# Patient Record
Sex: MALE | Race: WHITE | NOT HISPANIC OR LATINO
[De-identification: names, ages, dates, MRNs, and addresses within clinical notes are randomized per-mention and may not be internally consistent; named-entity substitution may affect disease eponyms.]

---

## 2017-01-13 ENCOUNTER — APPOINTMENT (OUTPATIENT)
Age: 72
End: 2017-01-13

## 2017-01-13 VITALS
BODY MASS INDEX: 24.92 KG/M2 | WEIGHT: 178 LBS | HEART RATE: 67 BPM | RESPIRATION RATE: 14 BRPM | DIASTOLIC BLOOD PRESSURE: 74 MMHG | HEIGHT: 71 IN | SYSTOLIC BLOOD PRESSURE: 152 MMHG | TEMPERATURE: 97.7 F

## 2017-01-17 LAB
AFP-TM SERPL-MCNC: 2.6 NG/ML
ALBUMIN SERPL ELPH-MCNC: 4 G/DL
ALP BLD-CCNC: 79 U/L
ALT SERPL-CCNC: 11 U/L
ANION GAP SERPL CALC-SCNC: 13 MMOL/L
AST SERPL-CCNC: 22 U/L
BASOPHILS # BLD AUTO: 0.03 K/UL
BASOPHILS NFR BLD AUTO: 0.4 %
BILIRUB SERPL-MCNC: 0.2 MG/DL
BUN SERPL-MCNC: 28 MG/DL
CALCIUM SERPL-MCNC: 9.1 MG/DL
CHLORIDE SERPL-SCNC: 106 MMOL/L
CO2 SERPL-SCNC: 24 MMOL/L
CREAT SERPL-MCNC: 1.57 MG/DL
EOSINOPHIL # BLD AUTO: 0.15 K/UL
EOSINOPHIL NFR BLD AUTO: 1.8 %
HCT VFR BLD CALC: 39.5 %
HCV RNA FLD QL NAA+PROBE: NORMAL
HCV RNA SPEC QL PROBE+SIG AMP: NOT DETECTED
HGB BLD-MCNC: 12.4 G/DL
IMM GRANULOCYTES NFR BLD AUTO: 0.5 %
LYMPHOCYTES # BLD AUTO: 1.94 K/UL
LYMPHOCYTES NFR BLD AUTO: 23.3 %
MAN DIFF?: NORMAL
MCHC RBC-ENTMCNC: 28.1 PG
MCHC RBC-ENTMCNC: 31.4 GM/DL
MCV RBC AUTO: 89.6 FL
MONOCYTES # BLD AUTO: 0.46 K/UL
MONOCYTES NFR BLD AUTO: 5.5 %
NEUTROPHILS # BLD AUTO: 5.72 K/UL
NEUTROPHILS NFR BLD AUTO: 68.5 %
PLATELET # BLD AUTO: 236 K/UL
POTASSIUM SERPL-SCNC: 5.5 MMOL/L
PROT SERPL-MCNC: 7.4 G/DL
RBC # BLD: 4.41 M/UL
RBC # FLD: 15.3 %
SODIUM SERPL-SCNC: 143 MMOL/L
WBC # FLD AUTO: 8.34 K/UL

## 2017-01-18 ENCOUNTER — EMERGENCY (EMERGENCY)
Facility: HOSPITAL | Age: 72
LOS: 1 days | Discharge: HOME | End: 2017-01-18
Attending: EMERGENCY MEDICINE | Admitting: EMERGENCY MEDICINE
Payer: MEDICARE

## 2017-01-18 VITALS
SYSTOLIC BLOOD PRESSURE: 209 MMHG | TEMPERATURE: 98 F | HEART RATE: 59 BPM | DIASTOLIC BLOOD PRESSURE: 79 MMHG | OXYGEN SATURATION: 100 % | RESPIRATION RATE: 20 BRPM

## 2017-01-18 PROCEDURE — 93010 ELECTROCARDIOGRAM REPORT: CPT

## 2017-01-18 PROCEDURE — 99284 EMERGENCY DEPT VISIT MOD MDM: CPT | Mod: 25

## 2017-01-18 RX ORDER — AMLODIPINE BESYLATE 2.5 MG/1
5 TABLET ORAL ONCE
Qty: 0 | Refills: 0 | Status: DISCONTINUED | OUTPATIENT
Start: 2017-01-18 | End: 2017-01-22

## 2017-01-18 NOTE — ED ADULT NURSE NOTE - OBJECTIVE STATEMENT
Octavia RN: Pt received to room20 A&Ox3 c/o high BP. Pt states his BP was high on Friday at his PCP appointment- was told to monitor it over the next few days. Pt went to [facility] tonight to take his BP and went to urgent care right after who told him to come to the ED to be evaluated. Pt takes 20mg Lisinopril PO daily- states compliance with his medications daily and took an extra dose today around 1:30pm per his PCP. Denies chest pain, dizziness, lightheadedness, changes in vision, SOB, n/v or increased stress. Pt appears comfortably in stretcher, respirations even and unlabored, nad noted at this time. PMH HTN, BLE stents 2/2 DVT X2. Sinus bradycardia on tele monitor, labs sent and medicated per MD orders. Report given to LIZZIE Bartlett in area.

## 2017-01-18 NOTE — ED ADULT NURSE NOTE - CAS ELECT INFOMATION PROVIDED
Break coverage RN- pt refused labs/meds, will follow up w. PCP, discharged by MD Angel./DC instructions

## 2017-01-18 NOTE — ED ADULT TRIAGE NOTE - CHIEF COMPLAINT QUOTE
blood pressure was 190/110  at home. as per pt his Bp was high all day, told his PMD and was told to take an extra lisinopril 20 mg which he took but the BP is still high. denies any headache and dizziness or CP or SOB or nausea or any other complaints.

## 2017-01-19 VITALS
HEART RATE: 52 BPM | SYSTOLIC BLOOD PRESSURE: 213 MMHG | DIASTOLIC BLOOD PRESSURE: 62 MMHG | RESPIRATION RATE: 18 BRPM | OXYGEN SATURATION: 100 %

## 2017-01-19 NOTE — ED PROVIDER NOTE - CARE PLAN
Principal Discharge DX:	HTN (hypertension)  Instructions for follow-up, activity and diet:	- Follow up with primary doctor tomorrow please.  - Considered starting a calcium channel blocker but refrained because of bilateral lower extremity edema.   - Return to the ED for new or worsening symptoms.

## 2017-01-19 NOTE — ED PROVIDER NOTE - OBJECTIVE STATEMENT
71M h/o hepC and HTN on [id number] p/w hypertension x 4-5 days. Was hypertensive at PMD's office 5 days ago, checked his BP the next day still high (unknown how high), went to [facility] today and was high, [place] [facility] [place]. Took second dose of lisinopril as per his PMD. Denies CP, SOB, vision change, HA, weakness, numbness, tingling.

## 2017-01-19 NOTE — ED PROVIDER NOTE - PLAN OF CARE
- Follow up with primary doctor tomorrow please.  - Considered starting a calcium channel blocker but refrained because of bilateral lower extremity edema.   - Return to the ED for new or worsening symptoms.

## 2017-01-19 NOTE — ED PROVIDER NOTE - MEDICAL DECISION MAKING DETAILS
asymptomatic hypertension, pt has f/u with doctor tomorrow. After shared decision making and risk vs benefits discussion pt would like to be discharged with outpt f/u

## 2017-01-19 NOTE — ED PROVIDER NOTE - ATTENDING CONTRIBUTION TO CARE
Nielsen: 71 yom with HTN on Lisinopril 20mg with elevated BP for few days, cheking it frequently now, noted BP 180s and 200s.  Pt has no headache, no numbness, no tingling, no cp, no sob, no abd pain, no back pain, no confusion.  Took extra Lisinopril today and has appt in AM.  On exam, /100, HR 57, 100% RA, PEERL, EOMI, clear lungs, nml cardiac, no abd tn or mass, 2+ pitting edema (pt was unaware and syas they always look with that).  EKG with bradycardia. Has long conversation with pt and family, as he is asymptomatic and has close follow up, will hold changing meds now.  No beta blocker due to low HR, no CCB with already swollen legs.  Suggest diuretic but only after electrolytes checked which pt states he had done and will get results as PMD (Dr. Munira Li) office.  Told to return if any symptoms.

## 2017-04-18 PROBLEM — I10 ESSENTIAL (PRIMARY) HYPERTENSION: Chronic | Status: ACTIVE | Noted: 2017-01-19

## 2017-04-20 ENCOUNTER — APPOINTMENT (OUTPATIENT)
Dept: ULTRASOUND IMAGING | Facility: CLINIC | Age: 72
End: 2017-04-20

## 2017-04-20 ENCOUNTER — OUTPATIENT (OUTPATIENT)
Dept: OUTPATIENT SERVICES | Facility: HOSPITAL | Age: 72
LOS: 1 days | End: 2017-04-20
Payer: MEDICARE

## 2017-04-20 DIAGNOSIS — B18.2 CHRONIC VIRAL HEPATITIS C: ICD-10-CM

## 2017-04-20 PROCEDURE — 76700 US EXAM ABDOM COMPLETE: CPT

## 2017-07-21 ENCOUNTER — APPOINTMENT (OUTPATIENT)
Age: 72
End: 2017-07-21

## 2017-07-21 VITALS
BODY MASS INDEX: 23.8 KG/M2 | RESPIRATION RATE: 15 BRPM | SYSTOLIC BLOOD PRESSURE: 126 MMHG | DIASTOLIC BLOOD PRESSURE: 66 MMHG | TEMPERATURE: 97.7 F | HEART RATE: 67 BPM | HEIGHT: 71 IN | WEIGHT: 170 LBS

## 2017-07-21 LAB
ALBUMIN SERPL ELPH-MCNC: 4 G/DL
ALP BLD-CCNC: 89 U/L
ALT SERPL-CCNC: 9 U/L
ANION GAP SERPL CALC-SCNC: 16 MMOL/L
AST SERPL-CCNC: 19 U/L
BASOPHILS # BLD AUTO: 0.02 K/UL
BASOPHILS # BLD AUTO: 0.03 K/UL
BASOPHILS NFR BLD AUTO: 0.2 %
BASOPHILS NFR BLD AUTO: 0.3 %
BILIRUB SERPL-MCNC: 0.2 MG/DL
BUN SERPL-MCNC: 32 MG/DL
CALCIUM SERPL-MCNC: 9 MG/DL
CHLORIDE SERPL-SCNC: 102 MMOL/L
CO2 SERPL-SCNC: 22 MMOL/L
CREAT SERPL-MCNC: 1.56 MG/DL
EOSINOPHIL # BLD AUTO: 0.15 K/UL
EOSINOPHIL # BLD AUTO: 0.17 K/UL
EOSINOPHIL NFR BLD AUTO: 1.7 %
EOSINOPHIL NFR BLD AUTO: 2 %
HCT VFR BLD CALC: 39.5 %
HCT VFR BLD CALC: 40.1 %
HGB BLD-MCNC: 12.7 G/DL
HGB BLD-MCNC: 12.7 G/DL
IMM GRANULOCYTES NFR BLD AUTO: 0.3 %
IMM GRANULOCYTES NFR BLD AUTO: 0.4 %
INR PPP: 1.02 RATIO
LYMPHOCYTES # BLD AUTO: 1.7 K/UL
LYMPHOCYTES # BLD AUTO: 1.88 K/UL
LYMPHOCYTES NFR BLD AUTO: 20.1 %
LYMPHOCYTES NFR BLD AUTO: 21.1 %
MAN DIFF?: NORMAL
MAN DIFF?: NORMAL
MCHC RBC-ENTMCNC: 28.3 PG
MCHC RBC-ENTMCNC: 28.7 PG
MCHC RBC-ENTMCNC: 31.7 GM/DL
MCHC RBC-ENTMCNC: 32.2 GM/DL
MCV RBC AUTO: 89.2 FL
MCV RBC AUTO: 89.5 FL
MONOCYTES # BLD AUTO: 0.42 K/UL
MONOCYTES # BLD AUTO: 0.5 K/UL
MONOCYTES NFR BLD AUTO: 5 %
MONOCYTES NFR BLD AUTO: 5.6 %
NEUTROPHILS # BLD AUTO: 6.11 K/UL
NEUTROPHILS # BLD AUTO: 6.34 K/UL
NEUTROPHILS NFR BLD AUTO: 71 %
NEUTROPHILS NFR BLD AUTO: 72.3 %
PLATELET # BLD AUTO: 240 K/UL
PLATELET # BLD AUTO: 241 K/UL
POTASSIUM SERPL-SCNC: 4.6 MMOL/L
PROT SERPL-MCNC: 7.6 G/DL
PT BLD: 11.5 SEC
RBC # BLD: 4.43 M/UL
RBC # BLD: 4.48 M/UL
RBC # FLD: 15.1 %
RBC # FLD: 15.1 %
SODIUM SERPL-SCNC: 140 MMOL/L
WBC # FLD AUTO: 8.45 K/UL
WBC # FLD AUTO: 8.93 K/UL

## 2017-07-21 RX ORDER — CEPHALEXIN 500 MG/1
500 CAPSULE ORAL
Qty: 14 | Refills: 0 | Status: DISCONTINUED | COMMUNITY
Start: 2017-05-20

## 2017-07-21 RX ORDER — CEFADROXIL 500 MG/1
500 CAPSULE ORAL
Qty: 20 | Refills: 0 | Status: DISCONTINUED | COMMUNITY
Start: 2017-05-23

## 2017-07-24 LAB — AFP-TM SERPL-MCNC: 2 NG/ML

## 2019-01-22 ENCOUNTER — APPOINTMENT (OUTPATIENT)
Dept: UROLOGY | Facility: CLINIC | Age: 74
End: 2019-01-22
Payer: MEDICARE

## 2019-01-22 VITALS
DIASTOLIC BLOOD PRESSURE: 74 MMHG | SYSTOLIC BLOOD PRESSURE: 174 MMHG | HEIGHT: 71 IN | TEMPERATURE: 97.9 F | HEART RATE: 61 BPM | BODY MASS INDEX: 26.6 KG/M2 | WEIGHT: 190 LBS | RESPIRATION RATE: 17 BRPM

## 2019-01-22 PROCEDURE — 99203 OFFICE O/P NEW LOW 30 MIN: CPT

## 2019-01-22 NOTE — ASSESSMENT
[FreeTextEntry1] : This is a 73 year old male with nocturnal polyuria, possibly secondary to BPH or MELISSA.\par \par - voiding diary to identify behavioral changes needed\par - will determine if sleep study is needed\par - check urine culture\par - follow up in 1 month\par - will check a baseline PSA for right sided glandular asymmetry

## 2019-01-22 NOTE — PHYSICAL EXAM
[General Appearance - Well Developed] : well developed [General Appearance - Well Nourished] : well nourished [Normal Appearance] : normal appearance [Well Groomed] : well groomed [General Appearance - In No Acute Distress] : no acute distress [Edema] : no peripheral edema [Abdomen Soft] : soft [Abdomen Tenderness] : non-tender [] : no hepato-splenomegaly [Normal Station and Gait] : the gait and station were normal for the patient's age [Skin Color & Pigmentation] : normal skin color and pigmentation [No Focal Deficits] : no focal deficits [Oriented To Time, Place, And Person] : oriented to person, place, and time [FreeTextEntry1] : 40 g prostate with right sided asymmetry, PVR 0 cc

## 2019-01-22 NOTE — HISTORY OF PRESENT ILLNESS
[FreeTextEntry1] : This is a 73 year old male and distant history with nocturia q 2 hours and sleeping during the daytime.  He has never had a PSA.  \par \par He reports that his urine volume overnight exceeds daytime.\par \par No hematuria or dysuria or pyuria.  Normal stream, mild hesitancy

## 2019-01-24 LAB — BACTERIA UR CULT: NORMAL

## 2019-01-25 LAB — PSA SERPL-MCNC: 3.05 NG/ML

## 2019-02-19 ENCOUNTER — NON-APPOINTMENT (OUTPATIENT)
Age: 74
End: 2019-02-19

## 2019-02-19 ENCOUNTER — APPOINTMENT (OUTPATIENT)
Dept: CARDIOLOGY | Facility: CLINIC | Age: 74
End: 2019-02-19
Payer: MEDICARE

## 2019-02-19 VITALS
WEIGHT: 190 LBS | BODY MASS INDEX: 26.6 KG/M2 | SYSTOLIC BLOOD PRESSURE: 150 MMHG | HEART RATE: 64 BPM | HEIGHT: 71 IN | OXYGEN SATURATION: 100 % | DIASTOLIC BLOOD PRESSURE: 75 MMHG

## 2019-02-19 PROCEDURE — 93000 ELECTROCARDIOGRAM COMPLETE: CPT

## 2019-02-19 PROCEDURE — 99205 OFFICE O/P NEW HI 60 MIN: CPT

## 2019-02-19 NOTE — HISTORY OF PRESENT ILLNESS
[FreeTextEntry1] : Viet is a 73-year-old gentleman BPH, chronic hepatitis C, hypertension, gout, PVD who presents at the request of his rheumatologist. He had an ECHO and stress with Dr. Bueno. He denies any chest pain, palpitations or shortness of breath. He had stents 5 years ago and recently had new ones. Stopped smoking five months ago.

## 2019-02-19 NOTE — DISCUSSION/SUMMARY
[FreeTextEntry1] : The patient is a 73-year-old gentleman ex-smoker  BPH, hypertension, gout, hyperlipidemia, PVD with persistent leg cramps. \par #1 PVD- stents 5 months ago, on DAPT, not smoking, add Coq10 and Mg\par #2 Htn- on amlodipine and lisinopril/HCTZ, stress test and reevaluate BP\par #3 Lipids- on atorvastatin, obtain labs from Dr. Malik\par #4 Gout- on allourinol\par #5 General- We discussed adherence to a low fat, low cholesterol diet and regular daily exercise.\par

## 2019-02-19 NOTE — REVIEW OF SYSTEMS
[Shortness Of Breath] : shortness of breath [Dyspnea on exertion] : dyspnea during exertion [Negative] : Heme/Lymph [Chest Pain] : no chest pain [Lower Ext Edema] : no extremity edema [Palpitations] : no palpitations

## 2019-02-19 NOTE — PHYSICAL EXAM
[General Appearance - Well Developed] : well developed [Normal Appearance] : normal appearance [Well Groomed] : well groomed [General Appearance - Well Nourished] : well nourished [No Deformities] : no deformities [General Appearance - In No Acute Distress] : no acute distress [Normal Conjunctiva] : the conjunctiva exhibited no abnormalities [Eyelids - No Xanthelasma] : the eyelids demonstrated no xanthelasmas [Normal Oral Mucosa] : normal oral mucosa [No Oral Pallor] : no oral pallor [No Oral Cyanosis] : no oral cyanosis [Normal Jugular Venous A Waves Present] : normal jugular venous A waves present [Normal Jugular Venous V Waves Present] : normal jugular venous V waves present [No Jugular Venous Vail A Waves] : no jugular venous avil A waves [Heart Rate And Rhythm] : heart rate and rhythm were normal [Heart Sounds] : normal S1 and S2 [Murmurs] : no murmurs present [Respiration, Rhythm And Depth] : normal respiratory rhythm and effort [Exaggerated Use Of Accessory Muscles For Inspiration] : no accessory muscle use [Auscultation Breath Sounds / Voice Sounds] : lungs were clear to auscultation bilaterally [Abdomen Soft] : soft [Abdomen Tenderness] : non-tender [Abdomen Mass (___ Cm)] : no abdominal mass palpated [Abnormal Walk] : normal gait [Gait - Sufficient For Exercise Testing] : the gait was sufficient for exercise testing [Nail Clubbing] : no clubbing of the fingernails [Cyanosis, Localized] : no localized cyanosis [Petechial Hemorrhages (___cm)] : no petechial hemorrhages [Skin Color & Pigmentation] : normal skin color and pigmentation [] : no rash [No Venous Stasis] : no venous stasis [Skin Lesions] : no skin lesions [No Skin Ulcers] : no skin ulcer [No Xanthoma] : no  xanthoma was observed [Oriented To Time, Place, And Person] : oriented to person, place, and time [Affect] : the affect was normal [Mood] : the mood was normal [No Anxiety] : not feeling anxious

## 2019-02-26 ENCOUNTER — APPOINTMENT (OUTPATIENT)
Dept: UROLOGY | Facility: CLINIC | Age: 74
End: 2019-02-26
Payer: MEDICARE

## 2019-02-26 DIAGNOSIS — N40.1 BENIGN PROSTATIC HYPERPLASIA WITH LOWER URINARY TRACT SYMPMS: ICD-10-CM

## 2019-02-26 DIAGNOSIS — R35.1 BENIGN PROSTATIC HYPERPLASIA WITH LOWER URINARY TRACT SYMPMS: ICD-10-CM

## 2019-02-26 PROCEDURE — 99213 OFFICE O/P EST LOW 20 MIN: CPT

## 2019-02-26 RX ORDER — TAMSULOSIN HYDROCHLORIDE 0.4 MG/1
0.4 CAPSULE ORAL
Qty: 30 | Refills: 2 | Status: DISCONTINUED | COMMUNITY
Start: 2019-02-26 | End: 2019-02-26

## 2019-02-26 NOTE — ASSESSMENT
[FreeTextEntry1] : This is a 74 y/o M with MELISSA and BPH and nocturia (not polyuria) in the setting of evening caffeine intake.  Given his daytime tiredness as well, I suspect this may be related to his sleep apnea or mental state.\par \par - would recommend a repeat sleep study to reassess symptoms and see if he needs\par - avoid nighttime caffeine intake \par - no need for further PSA examinations\par - will try to flomax to optimize bladder emptying

## 2019-02-26 NOTE — HISTORY OF PRESENT ILLNESS
[FreeTextEntry1] : This is a 73 year old male with BPH and MELISSA with nocturia q 2 hours and sleeping during the daytime. According to his voiding diary, he does not meet criteria for nocturnal polyuria (voids 4oz q 2 hours at night).\par \par He denies problems during the day and the voiding diary corroborates daytime voiding q 4 hours.  He has also been drinking larger volumes of coffee and water at nighttime around 7-8 PM.\par \par PSA 3.05 in the setting of mild right-sided glandular asymmetry.

## 2019-02-26 NOTE — PHYSICAL EXAM
[General Appearance - Well Developed] : well developed [General Appearance - Well Nourished] : well nourished [Normal Appearance] : normal appearance [Well Groomed] : well groomed [General Appearance - In No Acute Distress] : no acute distress [Abdomen Soft] : soft [Edema] : no peripheral edema [] : no respiratory distress [Exaggerated Use Of Accessory Muscles For Inspiration] : no accessory muscle use [Oriented To Time, Place, And Person] : oriented to person, place, and time [Affect] : the affect was normal [Not Anxious] : not anxious [Normal Station and Gait] : the gait and station were normal for the patient's age [No Focal Deficits] : no focal deficits

## 2019-02-27 ENCOUNTER — APPOINTMENT (OUTPATIENT)
Dept: HEPATOLOGY | Facility: CLINIC | Age: 74
End: 2019-02-27
Payer: MEDICARE

## 2019-02-27 VITALS
RESPIRATION RATE: 17 BRPM | HEART RATE: 78 BPM | WEIGHT: 189 LBS | BODY MASS INDEX: 26.46 KG/M2 | TEMPERATURE: 98.1 F | HEIGHT: 71 IN | DIASTOLIC BLOOD PRESSURE: 70 MMHG | SYSTOLIC BLOOD PRESSURE: 155 MMHG

## 2019-02-27 PROCEDURE — 99213 OFFICE O/P EST LOW 20 MIN: CPT

## 2019-02-27 NOTE — CONSULT LETTER
[Dear  ___] : Dear  [unfilled], [Courtesy Letter:] : I had the pleasure of seeing your patient, [unfilled], in my office today. [Please see my note below.] : Please see my note below. [Consult Closing:] : Thank you very much for allowing me to participate in the care of this patient.  If you have any questions, please do not hesitate to contact me. [Sincerely,] : Sincerely, [Naomi Torres, N.P] : Naomi Torres, N.P

## 2019-02-27 NOTE — HISTORY OF PRESENT ILLNESS
[de-identified] : Mr. Worthy comes in today for followup visit. He is accompanied by his wife.   He last came in for office visit in 2017. He has a history of hepatitis C, genotype 1a with grade 3 stage III disease on liver biopsy in 2009. He completed hepatitis C treatment  with Sovaldi and Olysio on 8/19/14 and is a sustained virologic responder.  He reports doing well without any complaints. He has no abdominal pain, nausea, vomiting, diarrhea or constipation. \par \par  In the past, he was treated in the past with pegylated interferon and ribavirin and was a relapser. On therapy he developed significant depression and  irritability.  \par \par Fibroscan test from 7/21/17 showed F1, steatosis S1\par \par Patient reported having colonoscopy in 2018 without polyps.\par \par Blood test from 2/7/19 ALT 10, AST 14, Tbil 0.2, ALP 94, ,000\par Abdominal sonogram from April 20, 2017 shows no hepatic lesions.\par Blood tests from March 29, 2016 HCV RNA not detected.\par Blood tests from October 30, 2014 platelets 227,000, HCVRNA not detected, ALT 13, AST 16. Abdominal sonogram from October 30, 2014 gallstone 1.7 cm unchanged. \par \par blood tests from April 1, 2015 hemoglobin 12.0, ALT 10, AST 18, HCV RNA not detected, creatinine 1.51.

## 2019-02-27 NOTE — PHYSICAL EXAM
[Abdominal  Ascites] : no ascites [Asterixis] : no asterixis observed [Jaundice] : No jaundice [General Appearance - Alert] : alert [General Appearance - In No Acute Distress] : in no acute distress [Sclera] : the sclera and conjunctiva were normal [Neck Appearance] : the appearance of the neck was normal [Neck Cervical Mass (___cm)] : no neck mass was observed [Jugular Venous Distention Increased] : there was no jugular-venous distention [Auscultation Breath Sounds / Voice Sounds] : lungs were clear to auscultation bilaterally [Heart Rate And Rhythm] : heart rate was normal and rhythm regular [Heart Sounds] : normal S1 and S2 [Full Pulse] : the pedal pulses are present [Edema] : there was no peripheral edema [Bowel Sounds] : normal bowel sounds [Abdomen Soft] : soft [Abdomen Tenderness] : non-tender [] : no hepato-splenomegaly [Abdomen Mass (___ Cm)] : no abdominal mass palpated [Oriented To Time, Place, And Person] : oriented to person, place, and time [Affect] : the affect was normal

## 2019-02-27 NOTE — ASSESSMENT
[FreeTextEntry1] : 73 year old male with history of hepatitis C S/P treatment  with Sovaldi and Olysio on 8/19/14. Patient is a sustained virological responder.  explained the meaning of sustained virological response. I explained that hepatitis C antibody will always present for life. I explained that the patient will not be able to donate blood because of the positive antibody. I have explained that the antibody is not protective and that hepatitis C infection can be acquired again if exposed. I reviewed the risk factors for acquiring hepatitis C. \par \par His liver biopsy on April 14, 2009 revealed grade 3 stage III disease. His fibroscan test performed today showed F1 fibrosis. I explained that its appeared he has improvement in his liver disease. Since he had bridging fibrosis on liver biopsy, I would like to continue to follow him every 6 months with laboratory studies and abdominal imaging to screen for HCC.\par \par  I recommended repeating AFP level and ultrasound to evaluate for liver cancer. He will call me to discuss the results. I recommended that he continue to abstain from alcohol use. I will see him back in 6 months. \par \par I have discussed with patient the finding of steatosis on Fibroscan. His last liver enzymes were normal. \par I spoke with the patient at length regarding fatty liver disease. I have reviewed the spectrum of disease as well as the risk of disease progression. I have explained that patient with fatty liver disease may progress to the development of cirrhosis and its complications. I have also explained the patient with fatty liver disease may develop liver cancer without cirrhosis and therefore should be screen yearly with an abdominal ultrasound. I have explained that fatty liver disease is commonly seen in patients with diabetes or those who are overweight. I have explained that fatty liver disease may also be a precursor to the development of diabetes as it is part of the metabolic syndrome. I have reviewed the treatment of fatty liver disease with the patient. I have explained that the best therapy is diet and exercise. I have reviewed and appropriate diet with the patient. I have recommended the avoidance of fatty foods and to follow a good healthy heart diet. I have explained that weight loss may lead to an improvement in the underlying liver disease state. \par \par

## 2019-02-28 LAB — AFP-TM SERPL-MCNC: 2.4 NG/ML

## 2019-04-02 ENCOUNTER — APPOINTMENT (OUTPATIENT)
Dept: OTOLARYNGOLOGY | Facility: CLINIC | Age: 74
End: 2019-04-02
Payer: MEDICARE

## 2019-04-02 VITALS
SYSTOLIC BLOOD PRESSURE: 158 MMHG | BODY MASS INDEX: 25.9 KG/M2 | HEIGHT: 71 IN | WEIGHT: 185 LBS | DIASTOLIC BLOOD PRESSURE: 68 MMHG | HEART RATE: 67 BPM

## 2019-04-02 DIAGNOSIS — J34.3 HYPERTROPHY OF NASAL TURBINATES: ICD-10-CM

## 2019-04-02 DIAGNOSIS — H60.91 UNSPECIFIED OTITIS EXTERNA, RIGHT EAR: ICD-10-CM

## 2019-04-02 DIAGNOSIS — H92.01 OTALGIA, RIGHT EAR: ICD-10-CM

## 2019-04-02 PROCEDURE — 92557 COMPREHENSIVE HEARING TEST: CPT

## 2019-04-02 PROCEDURE — 31231 NASAL ENDOSCOPY DX: CPT

## 2019-04-02 PROCEDURE — 99204 OFFICE O/P NEW MOD 45 MIN: CPT | Mod: 25

## 2019-04-02 PROCEDURE — 92567 TYMPANOMETRY: CPT

## 2019-04-02 PROCEDURE — G0268 REMOVAL OF IMPACTED WAX MD: CPT

## 2019-04-02 NOTE — HISTORY OF PRESENT ILLNESS
[de-identified] : 74 y/o M with hx of SNHL, uses HA.  Notes one week ago began having a humming nose in right ear.  No change in hearing, no d/c, no vertigo.  No pain at the time, however 3 days ago began having some mild pain postauricular area.  No pain in the ear.  \par No nasal congestion or sinus pain or pressure.

## 2019-04-02 NOTE — CONSULT LETTER
[FreeTextEntry1] : Dear Dr. HONORIO TIERNEY \par I had the pleasure of evaluating your patient WILMER MORALES, thank you for allowing us to participate in their care. please see full note detailing our visit below.\par If you have any questions, please do not hesitate to call me and I would be happy to discuss further. \par \par Joe Pierson M.D.\par Attending Physician,  \par Department of Otolaryngology - Head and Neck Surgery\[facility] \par Office: [phone]\par Fax: [phone]\par \par

## 2019-04-02 NOTE — PHYSICAL EXAM
[Midline] : trachea located in midline position [Normal] : no rashes [de-identified] : b/l exostosis severe exostosis limiting view of the TM, Right with mild erythema,

## 2019-04-02 NOTE — PROCEDURE
[FreeTextEntry3] : Procedure- removal of cerumen right \par Diagnosis - right cerumen impaction\par Right ear found to have impacted cerumen - it was cleared with suction and curette, canal with massive exostosis \par  [FreeTextEntry6] : Procedure performed: Nasal Endoscopy- Diagnostic\par Pre-op indication(s): nasal congestion\par Post-op indication(s): nasal congestion \par Verbal and/or written consent obtained from patient\par Anterior rhinoscopy insufficient to account for symptoms\par Scope #: 3,  flexible fiber optic telescope \par The scope was introduced in the nasal passage between the middle and inferior turbinates to exam the inferior portion of the middle meatus and the fontanelle, as well as the maxillary ostia.  Next, the scope was passed medically and posteriorly to the middle turbinates to examine the sphenoethmoid recess and the superior turbinate region.\par Upon visualization the finders are as follows:\par Nasal Septum: left septal deviation\par Bilateral - Mucosa: boggy turbinates, Mucous: scant, Polyp: not seen, Inferior Turbinate: boggy, Middle Turbinate: normal, Superior Turbinate: normal, Inferior Meatus: narrow, Middle Meatus: narrow, Super Meatus:normal, Sphenoethmoidal Recess: clear\par

## 2019-04-02 NOTE — ASSESSMENT
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 severe exostosis limiting middle ear evaluation, cerumen cleared right, ear is irritated. new tinnitus. based on audio suggests pt has fluid that may be causing at least part of hearing loss. NP clear, does have some BITh and NSD.  will assess ossicles and middle ear with CT scan. \par right ear pain likely TMJ related as increased with eating\par Patient likely has inflammation of the temporomandibular joint as a cause of their discomfit. I discussed with them the pathophysiology of TMJ disorders and we reviewed treatment options. At this point we will begin with a regiment to decrease inflammation, local muscle spams and demands on the joint to allow for recovery. We also discussed exercises and stretches to preform to decrease pain and relapse. Reviewed possible further interventions including muscle relaxants and injections. They will let me know if it does not completely resolve.\par drops right ear\par \par \par I personally saw and examined WILMER MORALES in detail. I spoke to AIMEE Thapa regarding the assessment and plan of care.  I preformed the procedures and I reviewed the above assessment and plan of care, and agree. I have made changes in changes in the body of the note where appropriate.\par \par

## 2019-04-04 ENCOUNTER — APPOINTMENT (OUTPATIENT)
Dept: ULTRASOUND IMAGING | Facility: CLINIC | Age: 74
End: 2019-04-04

## 2019-04-08 ENCOUNTER — APPOINTMENT (OUTPATIENT)
Dept: CV DIAGNOSTICS | Facility: HOSPITAL | Age: 74
End: 2019-04-08

## 2019-04-08 ENCOUNTER — FORM ENCOUNTER (OUTPATIENT)
Age: 74
End: 2019-04-08

## 2019-04-09 ENCOUNTER — OUTPATIENT (OUTPATIENT)
Dept: OUTPATIENT SERVICES | Facility: HOSPITAL | Age: 74
LOS: 1 days | End: 2019-04-09
Payer: MEDICARE

## 2019-04-09 ENCOUNTER — APPOINTMENT (OUTPATIENT)
Dept: CT IMAGING | Facility: CLINIC | Age: 74
End: 2019-04-09
Payer: MEDICARE

## 2019-04-09 DIAGNOSIS — Z00.8 ENCOUNTER FOR OTHER GENERAL EXAMINATION: ICD-10-CM

## 2019-04-09 PROCEDURE — 70480 CT ORBIT/EAR/FOSSA W/O DYE: CPT | Mod: 26

## 2019-04-09 PROCEDURE — 70480 CT ORBIT/EAR/FOSSA W/O DYE: CPT

## 2019-04-21 ENCOUNTER — FORM ENCOUNTER (OUTPATIENT)
Age: 74
End: 2019-04-21

## 2019-04-22 ENCOUNTER — APPOINTMENT (OUTPATIENT)
Dept: ULTRASOUND IMAGING | Facility: CLINIC | Age: 74
End: 2019-04-22
Payer: MEDICARE

## 2019-04-22 ENCOUNTER — OUTPATIENT (OUTPATIENT)
Dept: OUTPATIENT SERVICES | Facility: HOSPITAL | Age: 74
LOS: 1 days | End: 2019-04-22
Payer: MEDICARE

## 2019-04-22 DIAGNOSIS — Z00.8 ENCOUNTER FOR OTHER GENERAL EXAMINATION: ICD-10-CM

## 2019-04-22 PROCEDURE — 76700 US EXAM ABDOM COMPLETE: CPT | Mod: 26

## 2019-04-22 PROCEDURE — 76700 US EXAM ABDOM COMPLETE: CPT

## 2019-05-16 ENCOUNTER — APPOINTMENT (OUTPATIENT)
Dept: OTOLARYNGOLOGY | Facility: CLINIC | Age: 74
End: 2019-05-16
Payer: MEDICARE

## 2019-05-16 VITALS
SYSTOLIC BLOOD PRESSURE: 153 MMHG | WEIGHT: 185 LBS | HEIGHT: 71 IN | BODY MASS INDEX: 25.9 KG/M2 | DIASTOLIC BLOOD PRESSURE: 62 MMHG

## 2019-05-16 DIAGNOSIS — H61.813 EXOSTOSIS OF EXTERNAL CANAL, BILATERAL: ICD-10-CM

## 2019-05-16 DIAGNOSIS — J34.2 DEVIATED NASAL SEPTUM: ICD-10-CM

## 2019-05-16 DIAGNOSIS — H70.13 CHRONIC MASTOIDITIS, BILATERAL: ICD-10-CM

## 2019-05-16 DIAGNOSIS — M26.609 UNSPECIFIED TEMPOROMANDIBULAR JOINT DISORDER: ICD-10-CM

## 2019-05-16 DIAGNOSIS — H90.6 MIXED CONDUCTIVE AND SENSORINEURAL HEARING LOSS, BILATERAL: ICD-10-CM

## 2019-05-16 DIAGNOSIS — H93.11 TINNITUS, RIGHT EAR: ICD-10-CM

## 2019-05-16 DIAGNOSIS — H61.23 IMPACTED CERUMEN, BILATERAL: ICD-10-CM

## 2019-05-16 PROCEDURE — 92504 EAR MICROSCOPY EXAMINATION: CPT

## 2019-05-16 PROCEDURE — 99213 OFFICE O/P EST LOW 20 MIN: CPT | Mod: 25

## 2019-05-16 NOTE — PROCEDURE
[Risk and Benefits Discussed] : The purpose, risks, discomforts, benefits and alternatives of the procedure have been explained to the patient including no treatment. [Cerumen Impaction] : Cerumen Impaction [] : Removal of Cerumen [FreeTextEntry1] : poss epitympanic cholesteatoma [FreeTextEntry2] : epitympanic cholesteatoma-bilat [FreeTextEntry3] : exostoses,no cholesteatoma [FreeTextEntry6] : the microscope was necessary for illumination and magnification\par wax removed bilat\par exostoses w/o cholesteatoma noted

## 2019-05-16 NOTE — HISTORY OF PRESENT ILLNESS
[de-identified] : 74 y/o M with intermittent tinnitus in Right ear, with mild decrease in hearing.  No pain or d/c, no Vertigo. \par Was seen by Dr. Pierson on 4/2/19, Noted to have Mixed HL with significant conductive component b/l.  \par Had CT scan 4/9/19 - Rt. with small amount of soft tissue in the epitympanum.  TM thickened with possible perforation.  Left - Underdevelopment of mastoid air cells with near complete opacification of mastoid antrum.  Small amt of tissue in epitympanum.  Thickened TM.  [Tinnitus] : tinnitus [Vertigo] : no vertigo [Anxiety] : no anxiety [Headache] : no headache [Dizziness] : no dizziness [Hearing Loss] : hearing loss [Meniere Disease] : no Meniere disease [Presbycusis] : presbycusis [Eustachian Tube Dysfunction] : no eustachian tube dysfunction [Otosclerosis] : no otosclerosis [Cholesteatoma] : no cholesteatoma [Perilymphatic Fistula] : no perilymphatic fistula [Autoimmune Diseases] : no autoimmune diseases [Hypertension] : no hypertension [Hypotension] : no hypotension [Loud Noise Exposure] : no history of loud noise exposure [Smoking] : no smoking [Early Onset Hearing Loss] : no early onset hearing loss [Stroke] : no stroke [Facial Pain] : no facial pain [Facial Pressure] : no facial pressure [Nasal Congestion] : no nasal congestion [Ear Fullness] : no ear fullness [Environmental Allergies] : no environmental allergies [Seasonal Allergies] : no seasonal allergies [Allergies] : no allergies [Adenoidectomy] : no adenoidectomy [Asthma] : no asthma [Neck Mass] : no neck mass [Chills] : no chills [Neck Pain] : no neck pain [Cold Intolerance] : no cold intolerance [Cough] : no cough [Fatigue] : no fatigue [Heat Intolerance] : no heat intolerance [Hyperthyroidism] : no hyperthyroidism [Sialadenitis] : no sialadenitis [Hodgkin Disease] : no hodgkin disease [Tobacco Use] : no tobacco use [Non-Hodgkin Lymphoma] : no non-hodgkin lymphoma [Alcohol Use] : no alcohol use [Graves Disease] : no graves disease

## 2019-05-16 NOTE — PHYSICAL EXAM
[Midline] : trachea located in midline position [de-identified] : b/l exostosis.  Left with Cerumen  [Normal] : salivary glands are normal

## 2019-05-16 NOTE — ASSESSMENT
[FreeTextEntry1] : Bilat exostoses w/ wax\par no cgolesteatoma\par rx-rubbing alcohol to both ears several times a week\par bilateral sensorineural hearing loss-cleared for hearing aids\par f/u 4-6 months and prn\par pt reassured

## 2019-05-16 NOTE — DATA REVIEWED
[de-identified] : bilat hearing loss [de-identified] : Bilaty epitympanic fullness\par exostoses\par no yesenia erosion

## 2019-05-31 ENCOUNTER — APPOINTMENT (OUTPATIENT)
Dept: UROLOGY | Facility: CLINIC | Age: 74
End: 2019-05-31

## 2019-06-07 ENCOUNTER — OUTPATIENT (OUTPATIENT)
Dept: OUTPATIENT SERVICES | Facility: HOSPITAL | Age: 74
LOS: 1 days | End: 2019-06-07
Payer: MEDICARE

## 2019-06-07 ENCOUNTER — APPOINTMENT (OUTPATIENT)
Dept: ULTRASOUND IMAGING | Facility: HOSPITAL | Age: 74
End: 2019-06-07
Payer: MEDICARE

## 2019-06-07 DIAGNOSIS — Z00.00 ENCOUNTER FOR GENERAL ADULT MEDICAL EXAMINATION WITHOUT ABNORMAL FINDINGS: ICD-10-CM

## 2019-06-07 DIAGNOSIS — I74.9 EMBOLISM AND THROMBOSIS OF UNSPECIFIED ARTERY: ICD-10-CM

## 2019-06-07 PROCEDURE — 93880 EXTRACRANIAL BILAT STUDY: CPT | Mod: 26

## 2019-06-07 PROCEDURE — 93880 EXTRACRANIAL BILAT STUDY: CPT

## 2019-08-19 LAB
ALBUMIN SERPL ELPH-MCNC: 4.2 G/DL
ALP BLD-CCNC: 81 U/L
ALT SERPL-CCNC: 15 U/L
ANION GAP SERPL CALC-SCNC: 10 MMOL/L
AST SERPL-CCNC: 14 U/L
BASOPHILS # BLD AUTO: 0.04 K/UL
BASOPHILS NFR BLD AUTO: 0.5 %
BILIRUB SERPL-MCNC: 0.3 MG/DL
BUN SERPL-MCNC: 28 MG/DL
CALCIUM SERPL-MCNC: 8.9 MG/DL
CHLORIDE SERPL-SCNC: 106 MMOL/L
CO2 SERPL-SCNC: 25 MMOL/L
CREAT SERPL-MCNC: 1.44 MG/DL
EOSINOPHIL # BLD AUTO: 0.23 K/UL
EOSINOPHIL NFR BLD AUTO: 3 %
HCT VFR BLD CALC: 33.8 %
HGB BLD-MCNC: 10.5 G/DL
IMM GRANULOCYTES NFR BLD AUTO: 0.7 %
LYMPHOCYTES # BLD AUTO: 1.66 K/UL
LYMPHOCYTES NFR BLD AUTO: 21.7 %
MAN DIFF?: NORMAL
MCHC RBC-ENTMCNC: 28.9 PG
MCHC RBC-ENTMCNC: 31.1 GM/DL
MCV RBC AUTO: 93.1 FL
MONOCYTES # BLD AUTO: 0.54 K/UL
MONOCYTES NFR BLD AUTO: 7.1 %
NEUTROPHILS # BLD AUTO: 5.12 K/UL
NEUTROPHILS NFR BLD AUTO: 67 %
PLATELET # BLD AUTO: 214 K/UL
POTASSIUM SERPL-SCNC: 4.7 MMOL/L
PROT SERPL-MCNC: 6.7 G/DL
RBC # BLD: 3.63 M/UL
RBC # FLD: 16.3 %
SODIUM SERPL-SCNC: 141 MMOL/L
WBC # FLD AUTO: 7.64 K/UL

## 2019-08-21 ENCOUNTER — APPOINTMENT (OUTPATIENT)
Dept: HEPATOLOGY | Facility: CLINIC | Age: 74
End: 2019-08-21
Payer: MEDICARE

## 2019-08-21 VITALS
BODY MASS INDEX: 26.6 KG/M2 | TEMPERATURE: 97.6 F | WEIGHT: 190 LBS | SYSTOLIC BLOOD PRESSURE: 168 MMHG | RESPIRATION RATE: 16 BRPM | HEART RATE: 75 BPM | DIASTOLIC BLOOD PRESSURE: 74 MMHG | HEIGHT: 71 IN

## 2019-08-21 PROCEDURE — 91200 LIVER ELASTOGRAPHY: CPT

## 2019-08-21 PROCEDURE — ZZZZZ: CPT

## 2019-08-21 PROCEDURE — 99214 OFFICE O/P EST MOD 30 MIN: CPT | Mod: 25

## 2019-08-21 RX ORDER — CIPROFLOXACIN AND DEXAMETHASONE 3; 1 MG/ML; MG/ML
0.3-0.1 SUSPENSION/ DROPS AURICULAR (OTIC)
Qty: 1 | Refills: 0 | Status: DISCONTINUED | COMMUNITY
Start: 2019-04-02 | End: 2019-08-21

## 2019-08-21 RX ORDER — TAMSULOSIN HYDROCHLORIDE 0.4 MG/1
0.4 CAPSULE ORAL
Qty: 30 | Refills: 2 | Status: DISCONTINUED | COMMUNITY
Start: 2019-02-26 | End: 2019-08-21

## 2019-08-21 NOTE — ASSESSMENT
[FreeTextEntry1] : 74 year old male with history of hepatitis C S/P treatment  with Sovaldi and Olysio on 8/19/14. Patient is a sustained virological responder.  explained the meaning of sustained virological response. I explained that hepatitis C antibody will always present for life. I explained that the patient will not be able to donate blood because of the positive antibody. I have explained that the antibody is not protective and that hepatitis C infection can be acquired again if exposed. I reviewed the risk factors for acquiring hepatitis C. \par \par His liver biopsy on April 14, 2009 revealed grade 3 stage III disease. His fibroscan test performed today showed F2 fibrosis. I explained that its appeared he has improvement in his liver disease. Since he had bridging fibrosis on liver biopsy, I would like to continue to follow him every 6 months with laboratory studies and abdominal imaging to screen for HCC.\par \par  I recommended repeating ultrasound in Oct  to evaluate for liver cancer. He will call me to discuss the results. I recommended that he continue to abstain from alcohol use. I will see him back in 6 months. \par \par I have discussed with patient the finding of steatosis on Fibroscan. His last liver enzymes were normal. \par I spoke with the patient at length regarding fatty liver disease. I have reviewed the spectrum of disease as well as the risk of disease progression. I have explained that patient with fatty liver disease may progress to the development of cirrhosis and its complications. I have also explained the patient with fatty liver disease may develop liver cancer without cirrhosis and therefore should be screen yearly with an abdominal ultrasound. I have explained that fatty liver disease is commonly seen in patients with diabetes or those who are overweight. I have explained that fatty liver disease may also be a precursor to the development of diabetes as it is part of the metabolic syndrome. I have reviewed the treatment of fatty liver disease with the patient. I have explained that the best therapy is diet and exercise. I have reviewed and appropriate diet with the patient. I have recommended the avoidance of fatty foods and to follow a good healthy heart diet. I have explained that weight loss may lead to an improvement in the underlying liver disease state. \par \par

## 2019-08-21 NOTE — CONSULT LETTER
[Dear  ___] : Dear  [unfilled], [Please see my note below.] : Please see my note below. [Courtesy Letter:] : I had the pleasure of seeing your patient, [unfilled], in my office today. [Sincerely,] : Sincerely, [Consult Closing:] : Thank you very much for allowing me to participate in the care of this patient.  If you have any questions, please do not hesitate to contact me. [Naomi Torres, N.P] : Naomi Torres, N.P

## 2019-08-21 NOTE — PHYSICAL EXAM
[General Appearance - Alert] : alert [General Appearance - In No Acute Distress] : in no acute distress [Sclera] : the sclera and conjunctiva were normal [Neck Appearance] : the appearance of the neck was normal [Neck Cervical Mass (___cm)] : no neck mass was observed [Jugular Venous Distention Increased] : there was no jugular-venous distention [Auscultation Breath Sounds / Voice Sounds] : lungs were clear to auscultation bilaterally [Heart Rate And Rhythm] : heart rate was normal and rhythm regular [Heart Sounds] : normal S1 and S2 [Edema] : there was no peripheral edema [Full Pulse] : the pedal pulses are present [Abdomen Soft] : soft [Bowel Sounds] : normal bowel sounds [Abdomen Tenderness] : non-tender [] : no hepato-splenomegaly [Abdomen Mass (___ Cm)] : no abdominal mass palpated [Oriented To Time, Place, And Person] : oriented to person, place, and time [Affect] : the affect was normal [Abdominal  Ascites] : no ascites [Asterixis] : no asterixis observed [Jaundice] : No jaundice

## 2019-08-21 NOTE — HISTORY OF PRESENT ILLNESS
[de-identified] : Mr. Worthy comes in today for followup visit. He is accompanied by his wife. He has a history of hepatitis C, genotype 1a with grade 3 stage III disease on liver biopsy in 2009. He was treated for hepatitis C with Sovaldi and Olysio on 8/19/14 and is a sustained virologic responder.  He reports doing well without any complaints. He has no abdominal pain, nausea, vomiting, diarrhea or constipation. \par \par  In the past, he was treated in the past with pegylated interferon and ribavirin and was a relapser. On therapy he developed significant depression and  irritability.  \par \par Fibroscan test from 7/21/17 showed F1, steatosis S1\par Fibroscan test from 8/21/19 showed F2, S2\par \par Patient reported having colonoscopy in 2018 without polyps.\par \par Blood test from 2/7/19 ALT 10, AST 14, Tbil 0.2, ALP 94, ,000\par Abdominal sonogram from April 20, 2017 shows no hepatic lesions.\par Blood tests from March 29, 2016 HCV RNA not detected.\par Blood tests from October 30, 2014 platelets 227,000, HCVRNA not detected, ALT 13, AST 16. Abdominal sonogram from October 30, 2014 gallstone 1.7 cm unchanged. \par \par blood tests from April 1, 2015 hemoglobin 12.0, ALT 10, AST 18, HCV RNA not detected, creatinine 1.51.

## 2020-02-21 ENCOUNTER — APPOINTMENT (OUTPATIENT)
Dept: HEPATOLOGY | Facility: CLINIC | Age: 75
End: 2020-02-21
Payer: MEDICARE

## 2020-02-21 VITALS
SYSTOLIC BLOOD PRESSURE: 167 MMHG | HEIGHT: 71 IN | TEMPERATURE: 98 F | DIASTOLIC BLOOD PRESSURE: 64 MMHG | HEART RATE: 73 BPM | RESPIRATION RATE: 16 BRPM | BODY MASS INDEX: 27.86 KG/M2 | WEIGHT: 199 LBS

## 2020-02-21 PROCEDURE — 99214 OFFICE O/P EST MOD 30 MIN: CPT

## 2020-02-22 LAB
ALBUMIN SERPL ELPH-MCNC: 4.4 G/DL
ALP BLD-CCNC: 95 U/L
ALT SERPL-CCNC: 18 U/L
ANION GAP SERPL CALC-SCNC: 11 MMOL/L
AST SERPL-CCNC: 18 U/L
BASOPHILS # BLD AUTO: 0.03 K/UL
BASOPHILS NFR BLD AUTO: 0.4 %
BILIRUB SERPL-MCNC: 0.2 MG/DL
BUN SERPL-MCNC: 30 MG/DL
CALCIUM SERPL-MCNC: 9.1 MG/DL
CHLORIDE SERPL-SCNC: 109 MMOL/L
CO2 SERPL-SCNC: 23 MMOL/L
CREAT SERPL-MCNC: 1.54 MG/DL
EOSINOPHIL # BLD AUTO: 0.15 K/UL
EOSINOPHIL NFR BLD AUTO: 1.9 %
HCT VFR BLD CALC: 37 %
HGB BLD-MCNC: 11.2 G/DL
IMM GRANULOCYTES NFR BLD AUTO: 0.8 %
LYMPHOCYTES # BLD AUTO: 1.55 K/UL
LYMPHOCYTES NFR BLD AUTO: 20.1 %
MAN DIFF?: NORMAL
MCHC RBC-ENTMCNC: 28.4 PG
MCHC RBC-ENTMCNC: 30.3 GM/DL
MCV RBC AUTO: 93.7 FL
MONOCYTES # BLD AUTO: 0.76 K/UL
MONOCYTES NFR BLD AUTO: 9.9 %
NEUTROPHILS # BLD AUTO: 5.16 K/UL
NEUTROPHILS NFR BLD AUTO: 66.9 %
PLATELET # BLD AUTO: 202 K/UL
POTASSIUM SERPL-SCNC: 4.9 MMOL/L
PROT SERPL-MCNC: 6.6 G/DL
RBC # BLD: 3.95 M/UL
RBC # FLD: 15.6 %
SODIUM SERPL-SCNC: 144 MMOL/L
WBC # FLD AUTO: 7.71 K/UL

## 2020-02-24 ENCOUNTER — APPOINTMENT (OUTPATIENT)
Dept: HEPATOLOGY | Facility: CLINIC | Age: 75
End: 2020-02-24
Payer: MEDICARE

## 2020-02-24 LAB
HCV RNA SERPL NAA DL=5-ACNC: NOT DETECTED IU/ML
HCV RNA SERPL NAA+PROBE-LOG IU: NOT DETECTED LOG10IU/ML

## 2020-02-24 PROCEDURE — 91200 LIVER ELASTOGRAPHY: CPT

## 2020-03-02 ENCOUNTER — APPOINTMENT (OUTPATIENT)
Dept: HEPATOLOGY | Facility: CLINIC | Age: 75
End: 2020-03-02

## 2020-11-19 ENCOUNTER — APPOINTMENT (OUTPATIENT)
Dept: HEPATOLOGY | Facility: CLINIC | Age: 75
End: 2020-11-19

## 2021-01-06 ENCOUNTER — APPOINTMENT (OUTPATIENT)
Dept: HEPATOLOGY | Facility: CLINIC | Age: 76
End: 2021-01-06
Payer: MEDICARE

## 2021-01-06 VITALS
RESPIRATION RATE: 16 BRPM | HEART RATE: 65 BPM | BODY MASS INDEX: 27.02 KG/M2 | TEMPERATURE: 98 F | SYSTOLIC BLOOD PRESSURE: 159 MMHG | WEIGHT: 193 LBS | DIASTOLIC BLOOD PRESSURE: 66 MMHG | HEIGHT: 71 IN

## 2021-01-06 PROCEDURE — 99213 OFFICE O/P EST LOW 20 MIN: CPT

## 2021-01-06 PROCEDURE — 99072 ADDL SUPL MATRL&STAF TM PHE: CPT

## 2021-01-06 NOTE — HISTORY OF PRESENT ILLNESS
[FreeTextEntry1] :  is a 75 year old with history of hepatitis C (genotype 1a) status post Sovaldi and Olysio on 8/19/14 with SVR who presents to the hepatology clinic for follow up. He had a liver biopsy in 2009 that notes grade 3 stage III disease. In the past, he was treated in the past with pegylated interferon and ribavirin and was a relapser. On therapy he developed significant depression and irritability. He had a fibroscan 02/24/20 that scored a median liver stiffness of 6.4 kPa which is consistent with F 0-1 disease,  dB/m (S 2). His fibroscan from 08/21/20 scored a median liver stiffness of 7.2 kPa which is consistent with F2 disease,  dB/m (S 2).His fibroscan from 07/21/17 scored a liver stiffness score of 5.0 kPa which is consistent with F0-F1 liver disease and a CAP of 255 dB/m which is consistent with S1 fatty liver. His abdominal US from 04/2019 showed normal liver with no lesions. \par \par Since his last visit, he has been doing well. He lost about 5 lbs in the past year from being more active (his goal is to be ~185 lbs). He denies any recent hospitalization or any new medications. He denies any fever, chills, anorexia, weight change, abdominal pain, jaundice, pruritus or fatigue\par

## 2021-01-06 NOTE — ASSESSMENT
[FreeTextEntry1] : Assessment and Plan:  is a 75 year old with history of hepatitis C (genotype 1a) status post Sovaldi and Olysio on 8/19/14 with SVR who presents to the hepatology clinic for follow up.\par \par 1. Hepatitis C (genotype 1a) status post Sovaldi and Olysio on 8/19/14 with SVR\par Patient doing well at this time. He had a liver biopsy in 2009 that notes grade 3 stage III disease. He had a fibroscan 02/24/20 that scored a median liver stiffness of 6.4 kPa which is consistent with F 0-1 disease,  dB/m (S 2). Discussed he has had improvement with his liver disease overtime. Discussed Hep C re-infection risks and complications of the disease with patient. Will repeat labs now. \par \par HCC Screen: His abdominal US from 04/2019 showed normal liver with no lesions. Will repeat ultrasound now along with AFP with labs. \par \par Patient was advised to abstain from alcohol and all illicit drugs, avoid herbal and dietary supplements, limit use of acetaminophen to <2 grams per day, avoid use of nonsteroidal antiinflammatory drugs (NSAIDs) as these can precipitate renal dysfunction in patients with advanced liver disease, avoid eating any unpasteurized dairy products, and avoid eating raw/steamed oysters or other shellfish to avoid risk of Vibrio infection.\par \par 2. Health Maintenance \par Immunizations: will check HAV/HBV with next of set of labs\par Colonoscopy: 2019, due to repeat soon but patient prefers to wait\par \par Follow Up: 6 months \par \par Gonzalez Walter\par Nurse Practitioner \par Hepatology\par [facility] \par [address]\[place] [address]\par Tel: [phone]

## 2021-01-06 NOTE — PHYSICAL EXAM
[General Appearance - Alert] : alert [Respiration, Rhythm And Depth] : normal respiratory rhythm and effort [Heart Rate And Rhythm] : heart rate was normal and rhythm regular [Edema] : there was no peripheral edema [Bowel Sounds] : normal bowel sounds [Abnormal Walk] : normal gait [Skin Color & Pigmentation] : normal skin color and pigmentation [Oriented To Time, Place, And Person] : oriented to person, place, and time [Scleral Icterus] : No Scleral Icterus [Spider Angioma] : No spider angioma(s) were observed [Abdominal  Ascites] : no ascites [Ascites Fluid Wave] : no ascites fluid wave [Asterixis] : no asterixis observed [Jaundice] : No jaundice [Hallucinations] : ~T no ~M hallucinations [Delusions] : no ~T delusions

## 2021-01-06 NOTE — REVIEW OF SYSTEMS
[As Noted in HPI] : as noted in HPI [Recent Weight Loss (___ Lbs)] : recent [unfilled] ~Ulb weight loss [Negative] : Heme/Lymph [Fever] : no fever [Chills] : no chills [Feeling Poorly] : not feeling poorly [Feeling Tired] : not feeling tired [Recent Weight Gain (___ Lbs)] : no recent weight gain

## 2021-01-07 ENCOUNTER — NON-APPOINTMENT (OUTPATIENT)
Age: 76
End: 2021-01-07

## 2021-01-07 LAB
AFP-TM SERPL-MCNC: 2.2 NG/ML
ALBUMIN SERPL ELPH-MCNC: 4.2 G/DL
ALP BLD-CCNC: 98 U/L
ALT SERPL-CCNC: 13 U/L
ANION GAP SERPL CALC-SCNC: 11 MMOL/L
AST SERPL-CCNC: 17 U/L
BASOPHILS # BLD AUTO: 0.03 K/UL
BASOPHILS NFR BLD AUTO: 0.4 %
BILIRUB SERPL-MCNC: 0.2 MG/DL
BUN SERPL-MCNC: 28 MG/DL
CALCIUM SERPL-MCNC: 8.8 MG/DL
CHLORIDE SERPL-SCNC: 107 MMOL/L
CO2 SERPL-SCNC: 24 MMOL/L
CREAT SERPL-MCNC: 1.66 MG/DL
EOSINOPHIL # BLD AUTO: 0.23 K/UL
EOSINOPHIL NFR BLD AUTO: 3.1 %
GLUCOSE SERPL-MCNC: 96 MG/DL
HBV CORE IGG+IGM SER QL: REACTIVE
HBV SURFACE AB SERPL IA-ACNC: 203.1 MIU/ML
HCT VFR BLD CALC: 38 %
HEPATITIS A IGG ANTIBODY: REACTIVE
HGB BLD-MCNC: 11.2 G/DL
IMM GRANULOCYTES NFR BLD AUTO: 0.4 %
INR PPP: 0.93 RATIO
LYMPHOCYTES # BLD AUTO: 1.73 K/UL
LYMPHOCYTES NFR BLD AUTO: 23.7 %
MAN DIFF?: NORMAL
MCHC RBC-ENTMCNC: 27.2 PG
MCHC RBC-ENTMCNC: 29.5 GM/DL
MCV RBC AUTO: 92.2 FL
MONOCYTES # BLD AUTO: 0.52 K/UL
MONOCYTES NFR BLD AUTO: 7.1 %
NEUTROPHILS # BLD AUTO: 4.77 K/UL
NEUTROPHILS NFR BLD AUTO: 65.3 %
PLATELET # BLD AUTO: 204 K/UL
POTASSIUM SERPL-SCNC: 4.5 MMOL/L
PROT SERPL-MCNC: 6.5 G/DL
PT BLD: 11.1 SEC
RBC # BLD: 4.12 M/UL
RBC # FLD: 15.7 %
SODIUM SERPL-SCNC: 141 MMOL/L
WBC # FLD AUTO: 7.31 K/UL

## 2021-01-12 ENCOUNTER — OUTPATIENT (OUTPATIENT)
Dept: OUTPATIENT SERVICES | Facility: HOSPITAL | Age: 76
LOS: 1 days | End: 2021-01-12
Payer: MEDICARE

## 2021-01-12 ENCOUNTER — NON-APPOINTMENT (OUTPATIENT)
Age: 76
End: 2021-01-12

## 2021-01-12 ENCOUNTER — APPOINTMENT (OUTPATIENT)
Dept: ULTRASOUND IMAGING | Facility: CLINIC | Age: 76
End: 2021-01-12
Payer: MEDICARE

## 2021-01-12 DIAGNOSIS — B18.2 CHRONIC VIRAL HEPATITIS C: ICD-10-CM

## 2021-01-12 DIAGNOSIS — Z00.8 ENCOUNTER FOR OTHER GENERAL EXAMINATION: ICD-10-CM

## 2021-01-12 PROCEDURE — 76700 US EXAM ABDOM COMPLETE: CPT | Mod: 26

## 2021-01-12 PROCEDURE — 76700 US EXAM ABDOM COMPLETE: CPT

## 2021-06-08 ENCOUNTER — APPOINTMENT (OUTPATIENT)
Dept: HEPATOLOGY | Facility: CLINIC | Age: 76
End: 2021-06-08
Payer: MEDICARE

## 2021-06-08 VITALS
WEIGHT: 189 LBS | HEIGHT: 71 IN | HEART RATE: 80 BPM | DIASTOLIC BLOOD PRESSURE: 64 MMHG | BODY MASS INDEX: 26.46 KG/M2 | RESPIRATION RATE: 16 BRPM | TEMPERATURE: 97.9 F | SYSTOLIC BLOOD PRESSURE: 169 MMHG

## 2021-06-08 PROCEDURE — 99215 OFFICE O/P EST HI 40 MIN: CPT

## 2021-06-08 PROCEDURE — 91200 LIVER ELASTOGRAPHY: CPT

## 2021-06-08 NOTE — ASSESSMENT
[FreeTextEntry1] : Mr. WILMER MORALES is 75 year old male with Hepatic steatosis and fibrosis. No recent labs or imaging to review. Advised to call back to discuss the results, if no significant changes seen can F/U as planned.\par \par # Hepatitis C (genotype 1a) status post Sovaldi and Olysio on 8/19/14 with SVR. AFP 2.2. HCV RNA not detected since 02/21/2020. Patient doing well at this time. He had a liver biopsy in 2009 that notes grade 3 stage III disease. He had a fibroscan 02/24/20 that scored a median liver stiffness of 6.4 kPa which is consistent with F 0-1 disease,  dB/m (S 2). Discussed he has had improvement with his liver disease overtime. Discussed Hep C re-infection risks and complications of the disease with patient. Will repeat labs now. \par \par # HCC Screen: US ab on 01/12/2021 shows Normal results with no lesions. Will repeat ultrasound now along with AFP with labs. Fibroscan 02/24/20 shows liver stiffness of 6.4 kPa with F 0-1 disease,  dB/m (S 2). \par Patient was advised to abstain from alcohol and all illicit drugs, avoid herbal and dietary supplements, limit use of acetaminophen to <2 grams per day, avoid use of nonsteroidal anti-inflammatory drugs (NSAIDs) as these can precipitate renal dysfunction in patients with advanced liver disease, avoid eating any unpasteurized dairy products, and avoid eating raw/steamed oysters or other shellfish to avoid risk of Vibrio infection.\par \par # Chronic Renal failure - Labs done on 01/07/2021 WDL- CMP except chronic Bun/Cr 28/1.66 with low eGFR 39. \par # Immunizations: Immune to HBV and HAV (01/06/2021). He completed the 2 dose COVID vaccine with no adverse effects.\par \par # Colonoscopy: 2019, due to repeat soon but patient prefers to wait. Low Hb/Hct 11.2/38 with WDL- .\par \par PLAN to follow up 6 months with labs. \par Encouraged to call back in the interim with any issues or concerns so that we can address and assist as required.

## 2021-06-08 NOTE — HISTORY OF PRESENT ILLNESS
[de-identified] : Mr. WILMER MORALES is 75 year old male who presents for the follow up appointment with Hepatic steatosis and fibrosis He denies any recent hospitalization or any new medications. He denies any fever, chills, anorexia, weight change, abdominal pain, jaundice, pruritus or fatigue.\par \par Persistent H/O H/O hepatitis C (genotype 1a) status post Sovaldi and Olysio on 8/19/14 with SVR. Since 01/06/2021 he lost about 4 lbs (weighs 189 lbs). Was treated in the past with pegylated interferon and ribavirin and was a relapser. On therapy he developed significant depression and irritability. \par \par Liver biopsy in 2009 that notes grade 3 stage III disease. \par Fibroscan 02/24/20 shows liver stiffness of 6.4 kPa with F 0-1 disease,  dB/m (S 2). \par Fibroscan from 08/21/20 scored stiffness of 7.2 kPa with F2 disease,  dB/m (S 2).\par Fibroscan from 07/21/17 scored stiffness score of 5.0 kPa with F0-F1, CAP of 255 dB/m with S1 fatty liver. \par \par US ab on 01/12/2021 shows Normal results. Labs done on 01/07/2021 WDL- CMP except chronic Bun/Cr 28/1.66 with low eGFR 39. Low Hb/Hct 11.2/38 with WDL- , AFP 2.2. HCV RNA not detected since 02/21/2020. \par Immune to HBV and HAV (01/06/2021).\par \par Abdominal US from 04/2019 showed normal liver with no lesions. \par

## 2021-06-09 LAB
ALBUMIN SERPL ELPH-MCNC: 4.6 G/DL
ALP BLD-CCNC: 101 U/L
ALT SERPL-CCNC: 9 U/L
ANION GAP SERPL CALC-SCNC: 13 MMOL/L
AST SERPL-CCNC: 16 U/L
BASOPHILS # BLD AUTO: 0.04 K/UL
BASOPHILS NFR BLD AUTO: 0.5 %
BILIRUB SERPL-MCNC: 0.2 MG/DL
BUN SERPL-MCNC: 39 MG/DL
CALCIUM SERPL-MCNC: 9.3 MG/DL
CHLORIDE SERPL-SCNC: 106 MMOL/L
CO2 SERPL-SCNC: 21 MMOL/L
CREAT SERPL-MCNC: 1.76 MG/DL
EOSINOPHIL # BLD AUTO: 0.14 K/UL
EOSINOPHIL NFR BLD AUTO: 1.6 %
HCT VFR BLD CALC: 37.7 %
HGB BLD-MCNC: 11.9 G/DL
IMM GRANULOCYTES NFR BLD AUTO: 0.8 %
LYMPHOCYTES # BLD AUTO: 1.75 K/UL
LYMPHOCYTES NFR BLD AUTO: 20 %
MAN DIFF?: NORMAL
MCHC RBC-ENTMCNC: 28.9 PG
MCHC RBC-ENTMCNC: 31.6 GM/DL
MCV RBC AUTO: 91.5 FL
MONOCYTES # BLD AUTO: 0.63 K/UL
MONOCYTES NFR BLD AUTO: 7.2 %
NEUTROPHILS # BLD AUTO: 6.11 K/UL
NEUTROPHILS NFR BLD AUTO: 69.9 %
PLATELET # BLD AUTO: 206 K/UL
POTASSIUM SERPL-SCNC: 5.4 MMOL/L
PROT SERPL-MCNC: 7.1 G/DL
RBC # BLD: 4.12 M/UL
RBC # FLD: 15 %
SODIUM SERPL-SCNC: 141 MMOL/L
WBC # FLD AUTO: 8.74 K/UL

## 2021-06-21 ENCOUNTER — NON-APPOINTMENT (OUTPATIENT)
Age: 76
End: 2021-06-21

## 2021-11-15 ENCOUNTER — APPOINTMENT (OUTPATIENT)
Dept: ORTHOPEDIC SURGERY | Facility: CLINIC | Age: 76
End: 2021-11-15

## 2021-11-15 VITALS
DIASTOLIC BLOOD PRESSURE: 65 MMHG | SYSTOLIC BLOOD PRESSURE: 155 MMHG | HEIGHT: 71 IN | HEART RATE: 73 BPM | BODY MASS INDEX: 26.46 KG/M2 | WEIGHT: 189 LBS

## 2021-12-09 ENCOUNTER — RESULT REVIEW (OUTPATIENT)
Age: 76
End: 2021-12-09

## 2021-12-15 ENCOUNTER — RESULT REVIEW (OUTPATIENT)
Age: 76
End: 2021-12-15

## 2021-12-29 ENCOUNTER — APPOINTMENT (OUTPATIENT)
Dept: PULMONOLOGY | Facility: CLINIC | Age: 76
End: 2021-12-29

## 2022-01-03 ENCOUNTER — RESULT REVIEW (OUTPATIENT)
Age: 77
End: 2022-01-03

## 2022-01-10 ENCOUNTER — APPOINTMENT (OUTPATIENT)
Dept: THORACIC SURGERY | Facility: CLINIC | Age: 77
End: 2022-01-10
Payer: MEDICARE

## 2022-01-10 VITALS
BODY MASS INDEX: 26.16 KG/M2 | HEART RATE: 61 BPM | SYSTOLIC BLOOD PRESSURE: 152 MMHG | HEIGHT: 71.5 IN | DIASTOLIC BLOOD PRESSURE: 70 MMHG | RESPIRATION RATE: 16 BRPM | OXYGEN SATURATION: 98 % | WEIGHT: 191 LBS

## 2022-01-10 DIAGNOSIS — Z87.891 PERSONAL HISTORY OF NICOTINE DEPENDENCE: ICD-10-CM

## 2022-01-10 DIAGNOSIS — M10.9 GOUT, UNSPECIFIED: ICD-10-CM

## 2022-01-10 PROCEDURE — 99205 OFFICE O/P NEW HI 60 MIN: CPT

## 2022-01-10 RX ORDER — COLCHICINE 0.6 MG/1
0.6 CAPSULE ORAL
Refills: 0 | Status: DISCONTINUED | COMMUNITY
Start: 2021-06-08 | End: 2022-01-10

## 2022-01-11 ENCOUNTER — OUTPATIENT (OUTPATIENT)
Dept: OUTPATIENT SERVICES | Facility: HOSPITAL | Age: 77
LOS: 1 days | End: 2022-01-11
Payer: MEDICARE

## 2022-01-11 VITALS
HEART RATE: 70 BPM | TEMPERATURE: 96 F | OXYGEN SATURATION: 98 % | RESPIRATION RATE: 18 BRPM | HEIGHT: 67.5 IN | DIASTOLIC BLOOD PRESSURE: 60 MMHG | WEIGHT: 195.99 LBS | SYSTOLIC BLOOD PRESSURE: 160 MMHG

## 2022-01-11 DIAGNOSIS — I73.9 PERIPHERAL VASCULAR DISEASE, UNSPECIFIED: ICD-10-CM

## 2022-01-11 DIAGNOSIS — J91.0 MALIGNANT PLEURAL EFFUSION: ICD-10-CM

## 2022-01-11 DIAGNOSIS — Z87.39 PERSONAL HISTORY OF OTHER DISEASES OF THE MUSCULOSKELETAL SYSTEM AND CONNECTIVE TISSUE: ICD-10-CM

## 2022-01-11 DIAGNOSIS — Z86.79 PERSONAL HISTORY OF OTHER DISEASES OF THE CIRCULATORY SYSTEM: ICD-10-CM

## 2022-01-11 DIAGNOSIS — Z87.19 PERSONAL HISTORY OF OTHER DISEASES OF THE DIGESTIVE SYSTEM: ICD-10-CM

## 2022-01-11 DIAGNOSIS — Z95.820 PERIPHERAL VASCULAR ANGIOPLASTY STATUS WITH IMPLANTS AND GRAFTS: Chronic | ICD-10-CM

## 2022-01-11 DIAGNOSIS — I25.10 ATHEROSCLEROTIC HEART DISEASE OF NATIVE CORONARY ARTERY WITHOUT ANGINA PECTORIS: ICD-10-CM

## 2022-01-11 DIAGNOSIS — I10 ESSENTIAL (PRIMARY) HYPERTENSION: ICD-10-CM

## 2022-01-11 DIAGNOSIS — Z86.79 PERSONAL HISTORY OF OTHER DISEASES OF THE CIRCULATORY SYSTEM: Chronic | ICD-10-CM

## 2022-01-11 DIAGNOSIS — G47.33 OBSTRUCTIVE SLEEP APNEA (ADULT) (PEDIATRIC): ICD-10-CM

## 2022-01-11 DIAGNOSIS — Z98.49 CATARACT EXTRACTION STATUS, UNSPECIFIED EYE: Chronic | ICD-10-CM

## 2022-01-11 LAB
ALBUMIN SERPL ELPH-MCNC: 3.7 G/DL — SIGNIFICANT CHANGE UP (ref 3.3–5)
ALP SERPL-CCNC: 96 U/L — SIGNIFICANT CHANGE UP (ref 40–120)
ALT FLD-CCNC: <5 U/L — LOW (ref 4–41)
ANION GAP SERPL CALC-SCNC: 12 MMOL/L — SIGNIFICANT CHANGE UP (ref 7–14)
AST SERPL-CCNC: <5 U/L — LOW (ref 4–40)
BILIRUB SERPL-MCNC: <0.2 MG/DL — SIGNIFICANT CHANGE UP (ref 0.2–1.2)
BLD GP AB SCN SERPL QL: NEGATIVE — SIGNIFICANT CHANGE UP
BUN SERPL-MCNC: 35 MG/DL — HIGH (ref 7–23)
CALCIUM SERPL-MCNC: 8.6 MG/DL — SIGNIFICANT CHANGE UP (ref 8.4–10.5)
CHLORIDE SERPL-SCNC: 107 MMOL/L — SIGNIFICANT CHANGE UP (ref 98–107)
CO2 SERPL-SCNC: 22 MMOL/L — SIGNIFICANT CHANGE UP (ref 22–31)
CREAT SERPL-MCNC: 1.45 MG/DL — HIGH (ref 0.5–1.3)
GLUCOSE SERPL-MCNC: 118 MG/DL — HIGH (ref 70–99)
HCT VFR BLD CALC: 37.1 % — LOW (ref 39–50)
HGB BLD-MCNC: 12.4 G/DL — LOW (ref 13–17)
MCHC RBC-ENTMCNC: 30.5 PG — SIGNIFICANT CHANGE UP (ref 27–34)
MCHC RBC-ENTMCNC: 33.4 GM/DL — SIGNIFICANT CHANGE UP (ref 32–36)
MCV RBC AUTO: 91.2 FL — SIGNIFICANT CHANGE UP (ref 80–100)
NRBC # BLD: 0 /100 WBCS — SIGNIFICANT CHANGE UP
NRBC # FLD: 0 K/UL — SIGNIFICANT CHANGE UP
PLATELET # BLD AUTO: 268 K/UL — SIGNIFICANT CHANGE UP (ref 150–400)
POTASSIUM SERPL-MCNC: 4.4 MMOL/L — SIGNIFICANT CHANGE UP (ref 3.5–5.3)
POTASSIUM SERPL-SCNC: 4.4 MMOL/L — SIGNIFICANT CHANGE UP (ref 3.5–5.3)
PROT SERPL-MCNC: 6.6 G/DL — SIGNIFICANT CHANGE UP (ref 6–8.3)
RBC # BLD: 4.07 M/UL — LOW (ref 4.2–5.8)
RBC # FLD: 14.9 % — HIGH (ref 10.3–14.5)
RH IG SCN BLD-IMP: POSITIVE — SIGNIFICANT CHANGE UP
SODIUM SERPL-SCNC: 141 MMOL/L — SIGNIFICANT CHANGE UP (ref 135–145)
WBC # BLD: 8.47 K/UL — SIGNIFICANT CHANGE UP (ref 3.8–10.5)
WBC # FLD AUTO: 8.47 K/UL — SIGNIFICANT CHANGE UP (ref 3.8–10.5)

## 2022-01-11 PROCEDURE — 93010 ELECTROCARDIOGRAM REPORT: CPT

## 2022-01-11 RX ORDER — SODIUM CHLORIDE 9 MG/ML
1000 INJECTION, SOLUTION INTRAVENOUS
Refills: 0 | Status: DISCONTINUED | OUTPATIENT
Start: 2022-01-14 | End: 2022-01-16

## 2022-01-11 NOTE — H&P PST ADULT - HISTORY OF PRESENT ILLNESS
This is a 76 y.o. male  This is a 76 y.o. male complaining of dyspnea since 3/20 - intermittent , evaluated . Pt had cxr , 12/6/21 pleural effusion , CT of chest with pleural effusion , lung mass. Pt is s/p thoracentesis 12/9/21,  12/16/21 , 1/3/22 atypical findings. Pt has malignant pleural effusion , now for surgery 1/14/22.

## 2022-01-11 NOTE — H&P PST ADULT - NSICDXPASTMEDICALHX_GEN_ALL_CORE_FT
PAST MEDICAL HISTORY:  CAD (coronary artery disease)     COVID-19 3/20    Former smoker     Gout in past    H/O carotid stenosis     Hepatitis C chronic    HTN (hypertension)     PAD (peripheral artery disease) stent times 2; left  and right lower extremitiy ; on plavix stopped 1/9/22, eleiquis stopped 1/11/22 shara Peterson     PAST MEDICAL HISTORY:  CAD (coronary artery disease) on plavix stopped 1/9/22 , cardiac eval prior to OR    COVID-19 3/20    Former smoker     Gout in past    H/O carotid stenosis     Hepatitis C chronic    HTN (hypertension)     PAD (peripheral artery disease) stent times 2; left  and right lower extremitiy , eliquis stopped 1/11/22 as per Dr Peterson    Superficial thrombophlebitis of right leg 1/3/22 right gastronemius vein - started on eliquis to stop 1/11/22

## 2022-01-11 NOTE — H&P PST ADULT - PROBLEM SELECTOR PLAN 4
history of right carotid endarterectomy , cardiac clearance prior to OR as per Dr Peterson . Dr Bernstein to fax.

## 2022-01-11 NOTE — H&P PST ADULT - ATTENDING COMMENTS
The risks benefits and alternatives of the procedure were explained to the patient including but not limited to bleeding, infection, prolonged air leak, recurrence of the effusion, oxygen dependance and shortness of breath.  All of his questions were answered. He demonstrated understanding and freely consented to the procedure.

## 2022-01-11 NOTE — H&P PST ADULT - NSICDXPASTSURGICALHX_GEN_ALL_CORE_FT
PAST SURGICAL HISTORY:  H/O carotid stenosis right 2020 - endarterectomy    S/P cataract surgery right 2019    Status post peripheral artery angioplasty with insertion of stent left and right  2019

## 2022-01-11 NOTE — H&P PST ADULT - PROBLEM SELECTOR PLAN 1
Scheduled for left video assisted thoracoscopy , pleural biopsy , possible pleurodesis , pleurx catheter placement   Preop instructions provided and patient verbalizes understanding.  Labs done and results pending. Hibiclens provided with instructions and was signed by patient. Teach-back method was utilized to assess patient's understanding. Patient verbalized understanding.

## 2022-01-11 NOTE — H&P PST ADULT - PROBLEM SELECTOR PLAN 3
pt with history of peripheral stents to left and right lower extremity , lower extremity US 1/3/22 superficial thrombophlebitis in right gastrocnemius vein . No evidence of DVT in either lower extremity. Pt was started on eliquis to stop 1/11/22. Pt takes plavix , instructed to stop 1/9/22 for history of peripheral stents.

## 2022-01-13 ENCOUNTER — OUTPATIENT (OUTPATIENT)
Dept: OUTPATIENT SERVICES | Facility: HOSPITAL | Age: 77
LOS: 1 days | End: 2022-01-13
Payer: MEDICARE

## 2022-01-13 ENCOUNTER — TRANSCRIPTION ENCOUNTER (OUTPATIENT)
Age: 77
End: 2022-01-13

## 2022-01-13 ENCOUNTER — NON-APPOINTMENT (OUTPATIENT)
Age: 77
End: 2022-01-13

## 2022-01-13 ENCOUNTER — APPOINTMENT (OUTPATIENT)
Dept: CARDIOLOGY | Facility: CLINIC | Age: 77
End: 2022-01-13
Payer: MEDICARE

## 2022-01-13 VITALS
OXYGEN SATURATION: 98 % | BODY MASS INDEX: 26.16 KG/M2 | DIASTOLIC BLOOD PRESSURE: 73 MMHG | WEIGHT: 191 LBS | HEIGHT: 71.5 IN | HEART RATE: 61 BPM | SYSTOLIC BLOOD PRESSURE: 170 MMHG

## 2022-01-13 DIAGNOSIS — I73.9 PERIPHERAL VASCULAR DISEASE, UNSPECIFIED: ICD-10-CM

## 2022-01-13 DIAGNOSIS — R06.00 DYSPNEA, UNSPECIFIED: ICD-10-CM

## 2022-01-13 DIAGNOSIS — Z98.49 CATARACT EXTRACTION STATUS, UNSPECIFIED EYE: Chronic | ICD-10-CM

## 2022-01-13 DIAGNOSIS — I10 ESSENTIAL (PRIMARY) HYPERTENSION: ICD-10-CM

## 2022-01-13 DIAGNOSIS — Z95.820 PERIPHERAL VASCULAR ANGIOPLASTY STATUS WITH IMPLANTS AND GRAFTS: Chronic | ICD-10-CM

## 2022-01-13 DIAGNOSIS — Z86.79 PERSONAL HISTORY OF OTHER DISEASES OF THE CIRCULATORY SYSTEM: Chronic | ICD-10-CM

## 2022-01-13 PROBLEM — M10.9 GOUT, UNSPECIFIED: Chronic | Status: ACTIVE | Noted: 2022-01-11

## 2022-01-13 PROBLEM — I25.10 ATHEROSCLEROTIC HEART DISEASE OF NATIVE CORONARY ARTERY WITHOUT ANGINA PECTORIS: Chronic | Status: ACTIVE | Noted: 2022-01-11

## 2022-01-13 PROBLEM — B19.20 UNSPECIFIED VIRAL HEPATITIS C WITHOUT HEPATIC COMA: Chronic | Status: ACTIVE | Noted: 2017-01-19

## 2022-01-13 PROBLEM — I80.01 PHLEBITIS AND THROMBOPHLEBITIS OF SUPERFICIAL VESSELS OF RIGHT LOWER EXTREMITY: Chronic | Status: ACTIVE | Noted: 2022-01-11

## 2022-01-13 PROBLEM — Z87.891 PERSONAL HISTORY OF NICOTINE DEPENDENCE: Chronic | Status: ACTIVE | Noted: 2022-01-11

## 2022-01-13 PROBLEM — U07.1 COVID-19: Chronic | Status: ACTIVE | Noted: 2022-01-11

## 2022-01-13 LAB — SARS-COV-2 N GENE NPH QL NAA+PROBE: NOT DETECTED

## 2022-01-13 PROCEDURE — 99214 OFFICE O/P EST MOD 30 MIN: CPT

## 2022-01-13 PROCEDURE — 93306 TTE W/DOPPLER COMPLETE: CPT | Mod: 26

## 2022-01-13 PROCEDURE — 93306 TTE W/DOPPLER COMPLETE: CPT

## 2022-01-13 PROCEDURE — 93000 ELECTROCARDIOGRAM COMPLETE: CPT

## 2022-01-13 NOTE — CONSULT LETTER
[Dear  ___] : Dear  [unfilled], [Consult Letter:] : I had the pleasure of evaluating your patient, [unfilled]. [( Thank you for referring [unfilled] for consultation for _____ )] : Thank you for referring [unfilled] for consultation for [unfilled] [Please see my note below.] : Please see my note below. [Consult Closing:] : Thank you very much for allowing me to participate in the care of this patient.  If you have any questions, please do not hesitate to contact me. [Sincerely,] : Sincerely, [FreeTextEntry2] : Dr. Victor Hugo Reyes (Pulm/Ref) [FreeTextEntry3] : Onel Peterson MD \par Attending Surgeon \par Division of Thoracic Surgery \par , Cardiovascular and Thoracic Surgery \par [facility] [employer] [facility]\par \par

## 2022-01-13 NOTE — PHYSICAL EXAM
[General Appearance - Well Developed] : well developed [Normal Appearance] : normal appearance [Well Groomed] : well groomed [General Appearance - Well Nourished] : well nourished [No Deformities] : no deformities [General Appearance - In No Acute Distress] : no acute distress [Normal Conjunctiva] : the conjunctiva exhibited no abnormalities [Eyelids - No Xanthelasma] : the eyelids demonstrated no xanthelasmas [Normal Oral Mucosa] : normal oral mucosa [No Oral Pallor] : no oral pallor [No Oral Cyanosis] : no oral cyanosis [Normal Jugular Venous A Waves Present] : normal jugular venous A waves present [Normal Jugular Venous V Waves Present] : normal jugular venous V waves present [No Jugular Venous Vail A Waves] : no jugular venous vail A waves [Respiration, Rhythm And Depth] : normal respiratory rhythm and effort [Exaggerated Use Of Accessory Muscles For Inspiration] : no accessory muscle use [Auscultation Breath Sounds / Voice Sounds] : lungs were clear to auscultation bilaterally [Heart Rate And Rhythm] : heart rate and rhythm were normal [Heart Sounds] : normal S1 and S2 [Murmurs] : no murmurs present [Abdomen Soft] : soft [Abdomen Tenderness] : non-tender [Abdomen Mass (___ Cm)] : no abdominal mass palpated [Abnormal Walk] : normal gait [Gait - Sufficient For Exercise Testing] : the gait was sufficient for exercise testing [Nail Clubbing] : no clubbing of the fingernails [Cyanosis, Localized] : no localized cyanosis [Petechial Hemorrhages (___cm)] : no petechial hemorrhages [Skin Color & Pigmentation] : normal skin color and pigmentation [] : no rash [No Venous Stasis] : no venous stasis [Skin Lesions] : no skin lesions [No Skin Ulcers] : no skin ulcer [No Xanthoma] : no  xanthoma was observed [Oriented To Time, Place, And Person] : oriented to person, place, and time [Affect] : the affect was normal [Mood] : the mood was normal [No Anxiety] : not feeling anxious

## 2022-01-13 NOTE — DISCUSSION/SUMMARY
[Procedure Intermediate Risk] : the procedure risk is intermediate [Patient Intermediate Risk] : the patient is an intermediate risk [Additional Diagnostics Recommended] : additional diagnostics recommended [FreeTextEntry1] : The patient is a 76-year-old gentleman ex-smoker  BPH, hypertension, gout, hyperlipidemia, PVD with malignant pleural effusion awaiting surgery tomorrow\par #1 PVD- stents 3 years ago, not smoking, currently off aspirin for five days and eliquis for two days\par #2 Htn- continue amlodipine and lisinopril\par #3 Lipids- on atorvastatin\par #4 Gout- on allopurinol\par #5 Surgery- echo today before clearance. Functional capacity 4 METS.\par Addendum: LV function normal, no significant valve disease. There are no cardiac contraindications to urgent surgery. \par

## 2022-01-13 NOTE — CONSULT LETTER
[Dear  ___] : Dear  [unfilled], [Consult Letter:] : I had the pleasure of evaluating your patient, [unfilled]. [( Thank you for referring [unfilled] for consultation for _____ )] : Thank you for referring [unfilled] for consultation for [unfilled] [Please see my note below.] : Please see my note below. [Consult Closing:] : Thank you very much for allowing me to participate in the care of this patient.  If you have any questions, please do not hesitate to contact me. [Sincerely,] : Sincerely, [FreeTextEntry2] : Dr. Victor Hugo Reyes (Pulm/Ref) [FreeTextEntry3] : Onel Peterson MD \par Attending Surgeon \par Division of Thoracic Surgery \par , Cardiovascular and Thoracic Surgery \par [facility] [employer] [facility] at hospitals/[facility]\par \par

## 2022-01-13 NOTE — HISTORY OF PRESENT ILLNESS
[Preoperative Visit] : for a medical evaluation prior to surgery [Scheduled Procedure ___] : a [unfilled] [Date of Surgery ___] : on [unfilled] [Surgeon Name ___] : surgeon: [unfilled] [FreeTextEntry1] : Viet has not been here since first visit in 2019. He did not have his stress test at that time. Recently had thoracentesis with malignant effusion. he can walk 2-3 blocks before getting short of breath. No CP, palpitations, lightheadedness or dizziness. Works as . No off plavix for five days and eilquis for two days.

## 2022-01-13 NOTE — PHYSICAL EXAM
[Fully active, able to carry on all pre-disease performance without restriction] : Status 0 - Fully active, able to carry on all pre-disease performance without restriction [General Appearance - Alert] : alert [General Appearance - In No Acute Distress] : in no acute distress [Sclera] : the sclera and conjunctiva were normal [PERRL With Normal Accommodation] : pupils were equal in size, round, and reactive to light [Extraocular Movements] : extraocular movements were intact [Outer Ear] : the ears and nose were normal in appearance [Oropharynx] : the oropharynx was normal [Neck Appearance] : the appearance of the neck was normal [Neck Cervical Mass (___cm)] : no neck mass was observed [Jugular Venous Distention Increased] : there was no jugular-venous distention [Thyroid Diffuse Enlargement] : the thyroid was not enlarged [Thyroid Nodule] : there were no palpable thyroid nodules [Auscultation Breath Sounds / Voice Sounds] : lungs were clear to auscultation bilaterally [Heart Rate And Rhythm] : heart rate was normal and rhythm regular [Heart Sounds] : normal S1 and S2 [Heart Sounds Gallop] : no gallops [Murmurs] : no murmurs [Heart Sounds Pericardial Friction Rub] : no pericardial rub [Examination Of The Chest] : the chest was normal in appearance [Chest Visual Inspection Thoracic Asymmetry] : no chest asymmetry [Diminished Respiratory Excursion] : normal chest expansion [2+] : left 2+ [No Abnormalities] : the abdominal aorta was not enlarged and no bruit was heard [Breast Appearance] : normal in appearance [Breast Palpation Mass] : no palpable masses [Bowel Sounds] : normal bowel sounds [Abdomen Soft] : soft [Abdomen Tenderness] : non-tender [Abdomen Mass (___ Cm)] : no abdominal mass palpated [Cervical Lymph Nodes Enlarged Posterior Bilaterally] : posterior cervical [Cervical Lymph Nodes Enlarged Anterior Bilaterally] : anterior cervical [Supraclavicular Lymph Nodes Enlarged Bilaterally] : supraclavicular [No CVA Tenderness] : no ~M costovertebral angle tenderness [No Spinal Tenderness] : no spinal tenderness [Abnormal Walk] : normal gait [Nail Clubbing] : no clubbing  or cyanosis of the fingernails [Musculoskeletal - Swelling] : no joint swelling seen [Motor Tone] : muscle strength and tone were normal [Skin Color & Pigmentation] : normal skin color and pigmentation [Skin Turgor] : normal skin turgor [] : no rash [Deep Tendon Reflexes (DTR)] : deep tendon reflexes were 2+ and symmetric [Sensation] : the sensory exam was normal to light touch and pinprick [No Focal Deficits] : no focal deficits [Oriented To Time, Place, And Person] : oriented to person, place, and time [Impaired Insight] : insight and judgment were intact [Affect] : the affect was normal [Right Carotid Bruit] : no bruit heard over the right carotid [Left Carotid Bruit] : no bruit heard over the left carotid [Right Femoral Bruit] : no bruit heard over the right femoral artery [Left Femoral Bruit] : no bruit heard over the left femoral artery [FreeTextEntry1] : Deferred

## 2022-01-13 NOTE — REVIEW OF SYSTEMS
[SOB] : shortness of breath [Dyspnea on exertion] : dyspnea during exertion [Negative] : Heme/Lymph [Chest Discomfort] : no chest discomfort [Lower Ext Edema] : no extremity edema [Palpitations] : no palpitations

## 2022-01-13 NOTE — HISTORY OF PRESENT ILLNESS
[FreeTextEntry1] : Mr. WILMER MORALES, 76 year old male, former smoker, w/ hx of HTN, HLD, gout, carotis stenosis s/p stents?, CAD, DVT on Plavix, PAD s/p stents, HCV s/p treatment with SVR (f/u with hepatology clinic), who c/o dyspnea for 2 weeks, CXR on 12/06/2021 revealed pleural effusion, subsequently CT chest on 12/07/2021 revealed lung mass and pleural effusion. \par \par CT chest on 12/07/2021:\par - Left hilar mass suspicious for a left hilar neoplasm.\par - Moderate sized left pleural effusion causing partial compressive atelectasis of the left lower lobe.\par - Enlarged mediastinal lymph nodes within the prevascular region and suspected enlarged lymph node within the left epicardial fat pad suspicious for metastatic lymph nodes. \par - Focal pleural thickening along the lateral pleural surface of the left upper lobe which is suspicious for metastatic pleural involvement. Further evaluation of the above findings with a PET-CT scan is recommended.\par - Prominent interstitial lung changes with extensive honeycombing and cystic lung change within the right lower lobe. Findings are felt consistent with pulmonary fibrotic change.\par \par Patient is s/p thoracentesis on 12/09/2021. Path of left pleural effusion revealed negative for malignant cells. \par \par CXR on 12/16/2021:\par - Trace left pleural effusion.  Well-defined region of likely loculated fluid within the right minor fissure posteriorly.  The left hilar mass is better described on computed tomography scan from the same day\par \par CT chest on 12/16/2021:\par - Almost complete resolution of the left pleural effusion status post left pleurocentesis. No evidence of post pleurocentesis pneumothorax. \par - Small amount of loculated fluid within the right major fissure increased from the prior CT study.\par - Left hilar mass again suspicious for a left hilar neoplasm. Correlation with a PET-CT study is again recommended.\par \par Patient is s/p thoracentesis on 12/16/2021. 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 Metastatic carcinoma. \par \par Patient is s/p thoracentesis on 01/03/2021. 1,500 ml dark tan fluid was aspirated. Path revealed atypical findings. \par \par Patient is s/p bilateral lower extremity US on 01/03/2022 which revealed Superficial thrombophlebitis in the right gastrocnemius vein. No evidence of deep venous thrombosis in either lower extremity. Patient was started on Eliquis once a day. \par \par PET/CT on 01/05/2021;\par - Focal intense FDG activity corresponding to a left perihilar mass, partially occluding subsegmental bronchi of the left lower lobe (SUV 8.5 3.7 x 2.7 cm, image 98).  Extensive FDG uptake is noted in the mediastinum and lung pleura.  Examples-- A prevascular node, lateral to the aortic arch (SUV 5.9, 1.6 x 1.3 cm, image 87).\par Pleural nodule in the anterior chest wall inseparable from the pericardial fat (SUV 9.5, 2.8 x 1.7 cm, image 118). \par Nodularity at the lateral lingula (SUV 9.6, 2.3 x 0.9 cm, image 117).\par Nodularity along the posterior lung pleura anterior to the left 11 rib (SUV 5.7, image 148).\par - Right lung reticular opacities with mild tracer uptake represent infectious/inflammatory processes (SUV 3.3, image 120).\par - Stable small left pleural effusion without abnormal tracer uptake.\par \par Patient is here today for CT surgery consultation, referred by Dr. Victor Hugo Reyes (Pulm). Patient states his SOB improving after thoracentesis, denies cough, chest pain, fever, chills, loss of appetite, weight loss, or hemoptysis.

## 2022-01-13 NOTE — ASSESSMENT
[FreeTextEntry1] : Mr. WILMER MORALES, 76 year old male, former smoker, w/ hx of HTN, HLD, gout, carotis stenosis s/p stents?, CAD, DVT on Plavix, PAD s/p stents, HCV s/p treatment with SVR (f/u with hepatology clinic), who c/o dyspnea for 2 weeks, CXR on 12/06/2021 revealed pleural effusion, subsequently CT chest on 12/07/2021 revealed lung mass and pleural effusion. \par \par Patient is s/p thoracentesis on 12/09/2021. Path of left pleural effusion revealed negative for malignant cells. \par \par Patient is s/p thoracentesis on 12/16/2021. 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 Metastatic carcinoma. \par \par Patient is s/p thoracentesis on 01/03/2021. 1,500 ml dark tan fluid was aspirated. Path revealed atypical findings. \par \par I have reviewed the patient's medical records and diagnostic images at time of this office consultation and have made the following recommendation:\par 1. CT chest, PET/CT, and path reviewed and explained to patient, recurrent malignant pleural effusion, I recommended a Left VATS, pleural bx, possible pleurodesis, pleural catheter placement on 01/14/2021. Risks and benefits and alternatives explained to patient, all questions answered, patient agreed to proceed with surgery.\par 2. Hold Plavix 5 days and Eliquis 3 days before the surgery. \par 3. PST. \par \par I personally performed the services described in the documentation, reviewed the documentation recorded by the scribe in my presence and it accurately and completely records my words and actions.\par \par I, Xuan Wong NP, am scribing for and the presence of NAOMI Shi, the following sections HISTORY OF PRESENT ILLNESS, PAST MEDICAL/FAMILY/SOCIAL HISTORY; REVIEW OF SYSTEMS; VITAL SIGNS; PHYSICAL EXAM; DISPOSITION.

## 2022-01-14 ENCOUNTER — TRANSCRIPTION ENCOUNTER (OUTPATIENT)
Age: 77
End: 2022-01-14

## 2022-01-14 ENCOUNTER — RESULT REVIEW (OUTPATIENT)
Age: 77
End: 2022-01-14

## 2022-01-14 ENCOUNTER — APPOINTMENT (OUTPATIENT)
Dept: THORACIC SURGERY | Facility: HOSPITAL | Age: 77
End: 2022-01-14

## 2022-01-14 ENCOUNTER — INPATIENT (INPATIENT)
Facility: HOSPITAL | Age: 77
LOS: 2 days | Discharge: HOME | End: 2022-01-17
Attending: THORACIC SURGERY (CARDIOTHORACIC VASCULAR SURGERY) | Admitting: THORACIC SURGERY (CARDIOTHORACIC VASCULAR SURGERY)
Payer: MEDICARE

## 2022-01-14 VITALS
DIASTOLIC BLOOD PRESSURE: 58 MMHG | RESPIRATION RATE: 14 BRPM | WEIGHT: 195.99 LBS | SYSTOLIC BLOOD PRESSURE: 174 MMHG | HEART RATE: 66 BPM | TEMPERATURE: 98 F | OXYGEN SATURATION: 99 % | HEIGHT: 67.5 IN

## 2022-01-14 DIAGNOSIS — Z98.49 CATARACT EXTRACTION STATUS, UNSPECIFIED EYE: Chronic | ICD-10-CM

## 2022-01-14 DIAGNOSIS — Z95.820 PERIPHERAL VASCULAR ANGIOPLASTY STATUS WITH IMPLANTS AND GRAFTS: Chronic | ICD-10-CM

## 2022-01-14 DIAGNOSIS — Z86.79 PERSONAL HISTORY OF OTHER DISEASES OF THE CIRCULATORY SYSTEM: Chronic | ICD-10-CM

## 2022-01-14 DIAGNOSIS — J91.0 MALIGNANT PLEURAL EFFUSION: ICD-10-CM

## 2022-01-14 PROCEDURE — 88341 IMHCHEM/IMCYTCHM EA ADD ANTB: CPT | Mod: 26

## 2022-01-14 PROCEDURE — 31622 DX BRONCHOSCOPE/WASH: CPT

## 2022-01-14 PROCEDURE — 88342 IMHCHEM/IMCYTCHM 1ST ANTB: CPT | Mod: 26

## 2022-01-14 PROCEDURE — 32550 INSERT PLEURAL CATH: CPT

## 2022-01-14 PROCEDURE — 71045 X-RAY EXAM CHEST 1 VIEW: CPT | Mod: 26

## 2022-01-14 PROCEDURE — 32609 THORACOSCOPY W/BX PLEURA: CPT

## 2022-01-14 PROCEDURE — 88305 TISSUE EXAM BY PATHOLOGIST: CPT | Mod: 26

## 2022-01-14 DEVICE — PLEURX CATHETER KIT: Type: IMPLANTABLE DEVICE | Site: LEFT | Status: FUNCTIONAL

## 2022-01-14 RX ORDER — HEPARIN SODIUM 5000 [USP'U]/ML
5000 INJECTION INTRAVENOUS; SUBCUTANEOUS EVERY 8 HOURS
Refills: 0 | Status: DISCONTINUED | OUTPATIENT
Start: 2022-01-14 | End: 2022-01-16

## 2022-01-14 RX ORDER — OXYCODONE HYDROCHLORIDE 5 MG/1
1 TABLET ORAL
Qty: 20 | Refills: 0
Start: 2022-01-14

## 2022-01-14 RX ORDER — ACETAMINOPHEN 500 MG
975 TABLET ORAL ONCE
Refills: 0 | Status: COMPLETED | OUTPATIENT
Start: 2022-01-14 | End: 2022-01-14

## 2022-01-14 RX ORDER — HYDROMORPHONE HYDROCHLORIDE 2 MG/ML
30 INJECTION INTRAMUSCULAR; INTRAVENOUS; SUBCUTANEOUS
Refills: 0 | Status: DISCONTINUED | OUTPATIENT
Start: 2022-01-14 | End: 2022-01-15

## 2022-01-14 RX ORDER — HYDROMORPHONE HYDROCHLORIDE 2 MG/ML
0.5 INJECTION INTRAMUSCULAR; INTRAVENOUS; SUBCUTANEOUS
Refills: 0 | Status: DISCONTINUED | OUTPATIENT
Start: 2022-01-14 | End: 2022-01-15

## 2022-01-14 RX ORDER — ALBUTEROL 90 UG/1
1 AEROSOL, METERED ORAL
Refills: 0 | Status: DISCONTINUED | OUTPATIENT
Start: 2022-01-14 | End: 2022-01-17

## 2022-01-14 RX ORDER — ATORVASTATIN CALCIUM 80 MG/1
20 TABLET, FILM COATED ORAL AT BEDTIME
Refills: 0 | Status: DISCONTINUED | OUTPATIENT
Start: 2022-01-14 | End: 2022-01-17

## 2022-01-14 RX ORDER — ALLOPURINOL 300 MG
300 TABLET ORAL DAILY
Refills: 0 | Status: DISCONTINUED | OUTPATIENT
Start: 2022-01-14 | End: 2022-01-17

## 2022-01-14 RX ORDER — SENNA PLUS 8.6 MG/1
2 TABLET ORAL AT BEDTIME
Refills: 0 | Status: DISCONTINUED | OUTPATIENT
Start: 2022-01-14 | End: 2022-01-17

## 2022-01-14 RX ORDER — POLYETHYLENE GLYCOL 3350 17 G/17G
17 POWDER, FOR SOLUTION ORAL DAILY
Refills: 0 | Status: DISCONTINUED | OUTPATIENT
Start: 2022-01-14 | End: 2022-01-17

## 2022-01-14 RX ORDER — ONDANSETRON 8 MG/1
4 TABLET, FILM COATED ORAL EVERY 6 HOURS
Refills: 0 | Status: DISCONTINUED | OUTPATIENT
Start: 2022-01-14 | End: 2022-01-15

## 2022-01-14 RX ORDER — NALOXONE HYDROCHLORIDE 4 MG/.1ML
0.1 SPRAY NASAL
Refills: 0 | Status: DISCONTINUED | OUTPATIENT
Start: 2022-01-14 | End: 2022-01-15

## 2022-01-14 RX ORDER — HYDROMORPHONE HYDROCHLORIDE 2 MG/ML
0.5 INJECTION INTRAMUSCULAR; INTRAVENOUS; SUBCUTANEOUS
Refills: 0 | Status: DISCONTINUED | OUTPATIENT
Start: 2022-01-14 | End: 2022-01-14

## 2022-01-14 RX ORDER — LISINOPRIL 2.5 MG/1
20 TABLET ORAL DAILY
Refills: 0 | Status: DISCONTINUED | OUTPATIENT
Start: 2022-01-15 | End: 2022-01-17

## 2022-01-14 RX ORDER — ALBUTEROL 90 UG/1
0 AEROSOL, METERED ORAL
Qty: 0 | Refills: 0 | DISCHARGE

## 2022-01-14 RX ORDER — HEPARIN SODIUM 5000 [USP'U]/ML
5000 INJECTION INTRAVENOUS; SUBCUTANEOUS ONCE
Refills: 0 | Status: COMPLETED | OUTPATIENT
Start: 2022-01-14 | End: 2022-01-14

## 2022-01-14 RX ORDER — AMLODIPINE BESYLATE 2.5 MG/1
10 TABLET ORAL DAILY
Refills: 0 | Status: DISCONTINUED | OUTPATIENT
Start: 2022-01-15 | End: 2022-01-17

## 2022-01-14 RX ADMIN — HYDROMORPHONE HYDROCHLORIDE 30 MILLILITER(S): 2 INJECTION INTRAMUSCULAR; INTRAVENOUS; SUBCUTANEOUS at 11:28

## 2022-01-14 RX ADMIN — HEPARIN SODIUM 5000 UNIT(S): 5000 INJECTION INTRAVENOUS; SUBCUTANEOUS at 14:09

## 2022-01-14 RX ADMIN — SENNA PLUS 2 TABLET(S): 8.6 TABLET ORAL at 23:09

## 2022-01-14 RX ADMIN — HYDROMORPHONE HYDROCHLORIDE 0.5 MILLIGRAM(S): 2 INJECTION INTRAMUSCULAR; INTRAVENOUS; SUBCUTANEOUS at 11:20

## 2022-01-14 RX ADMIN — Medication 975 MILLIGRAM(S): at 08:50

## 2022-01-14 RX ADMIN — HEPARIN SODIUM 5000 UNIT(S): 5000 INJECTION INTRAVENOUS; SUBCUTANEOUS at 08:41

## 2022-01-14 RX ADMIN — HYDROMORPHONE HYDROCHLORIDE 0.5 MILLIGRAM(S): 2 INJECTION INTRAMUSCULAR; INTRAVENOUS; SUBCUTANEOUS at 11:05

## 2022-01-14 RX ADMIN — HEPARIN SODIUM 5000 UNIT(S): 5000 INJECTION INTRAVENOUS; SUBCUTANEOUS at 23:08

## 2022-01-14 RX ADMIN — Medication 300 MILLIGRAM(S): at 14:09

## 2022-01-14 RX ADMIN — HYDROMORPHONE HYDROCHLORIDE 30 MILLILITER(S): 2 INJECTION INTRAMUSCULAR; INTRAVENOUS; SUBCUTANEOUS at 14:57

## 2022-01-14 RX ADMIN — HYDROMORPHONE HYDROCHLORIDE 0.5 MILLIGRAM(S): 2 INJECTION INTRAMUSCULAR; INTRAVENOUS; SUBCUTANEOUS at 20:34

## 2022-01-14 RX ADMIN — ATORVASTATIN CALCIUM 20 MILLIGRAM(S): 80 TABLET, FILM COATED ORAL at 23:08

## 2022-01-14 NOTE — DISCHARGE NOTE NURSING/CASE MANAGEMENT/SOCIAL WORK - PATIENT PORTAL LINK FT
You can access the FollowMyHealth Patient Portal offered by [facility] by registering at the following website: [email or web address]. By joining [employer]’s FollowMyHealth portal, you will also be able to view your health information using other applications (apps) compatible with our system.

## 2022-01-14 NOTE — DISCHARGE NOTE NURSING/CASE MANAGEMENT/SOCIAL WORK - NSDCPEFALRISK_GEN_ALL_CORE
For information on Fall & Injury Prevention, visit: [email or web address] OR  [email or web address] OR  https://www.cdc.gov/steadi/patient.html

## 2022-01-14 NOTE — BRIEF OPERATIVE NOTE - NSICDXBRIEFPROCEDURE_GEN_ALL_CORE_FT
PROCEDURES:  Bronchoscopy, flexible, by CT surgery 14-Jan-2022 10:41:05  Heber Lundy  VATS, with pleural biopsy 14-Jan-2022 10:41:41 Left Heber Lundy  Insertion of Pleur-X catheter in left pleural space 14-Jan-2022 10:41:53  Heber Lundy  Drainage of pleural effusion 14-Jan-2022 10:42:14  Heber Lundy

## 2022-01-14 NOTE — DISCHARGE NOTE PROVIDER - NSDCHHNEEDSERVICEOTHER_GEN_ALL_CORE_FT
drain pleurx catheter 3x weekly, no more than one liter each time. teach family as needed drain pleurx catheter 3x weekly, no more than one liter each time. teach family as needed  Home o2

## 2022-01-14 NOTE — PATIENT PROFILE ADULT - FALL HARM RISK - HARM RISK INTERVENTIONS
Assistance with ambulation/Assistance OOB with selected safe patient handling equipment/Communicate Risk of Fall with Harm to all staff/Discuss with provider need for PT consult/Monitor gait and stability/Provide patient with walking aids - walker, cane, crutches/Reinforce activity limits and safety measures with patient and family/Sit up slowly, dangle for a short time, stand at bedside before walking/Tailored Fall Risk Interventions/Use of alarms - bed, chair and/or voice tab/Visual Cue: Yellow wristband and red socks/Bed in lowest position, wheels locked, appropriate side rails in place/Call bell, personal items and telephone in reach/Instruct patient to call for assistance before getting out of bed or chair/Non-slip footwear when patient is out of bed/[place] to call system/Physically safe environment - no spills, clutter or unnecessary equipment/Purposeful Proactive Rounding/Room/bathroom lighting operational, light cord in reach

## 2022-01-14 NOTE — DISCHARGE NOTE NURSING/CASE MANAGEMENT/SOCIAL WORK - NSDCPECAREGIVERED_GEN_ALL_CORE
Medline and carenotes for Pleural Effusion, How to take care of your Chest Tube, Eliquis, as well as DC Medications and side effects literature for patient reference./Yes

## 2022-01-14 NOTE — DISCHARGE NOTE NURSING/CASE MANAGEMENT/SOCIAL WORK - NSDCFUADDAPPT_GEN_ALL_CORE_FT
pt given 4 pleurex catheter kits  pt given 4 pleurex catheter kits  to take home placed in pt room 1/14/2022

## 2022-01-14 NOTE — DISCHARGE NOTE NURSING/CASE MANAGEMENT/SOCIAL WORK - NSDCPNINST_GEN_ALL_CORE
Maintain Pleurex cath insertion site clean and dry, call MD with any signs of infection such as fever, redness or drainage from site.  Call MD with any worsening of symptoms ie. Difficulty breathing, shortness of breath, fever over 100.5. Continue to drink fluids to hydrate.   Continue hand hygiene as instructed, and measures to prevent the spread of infection ie. wear mask, and practice social distancing as advised.  Take medications as prescribed, follow-up with your surgeon  Dr. Peterson as well as PMD as instructed for continuity of care.

## 2022-01-14 NOTE — ASU PATIENT PROFILE, ADULT - NSICDXPASTMEDICALHX_GEN_ALL_CORE_FT
PAST MEDICAL HISTORY:  CAD (coronary artery disease) on plavix stopped 1/9/22 , cardiac eval prior to OR    COVID-19 3/20    Former smoker     Gout in past    H/O carotid stenosis     Hepatitis C chronic    HTN (hypertension)     PAD (peripheral artery disease) stent times 2; left  and right lower extremitiy , eliquis stopped 1/11/22 as per Dr Peterson    Superficial thrombophlebitis of right leg 1/3/22 right gastronemius vein - started on eliquis to stop 1/11/22

## 2022-01-14 NOTE — DISCHARGE NOTE PROVIDER - ATTENDING DISCHARGE PHYSICAL EXAMINATION:
T(C): 36.6 (01-14-22 @ 15:23), Max: 36.8 (01-14-22 @ 08:10)  HR: 70 (01-14-22 @ 15:23) (56 - 71)  BP: 143/70 (01-14-22 @ 15:23) (127/72 - 174/58)  RR: 16 (01-14-22 @ 15:23) (7 - 19)  SpO2: 98% (01-14-22 @ 15:23) (93% - 100%)    CONSTITUTIONAL: Well groomed, no apparent distress  RESPIRATORY: No respiratory distress, no use of accessory muscles; CTA b/l, no wheezes, rales or rhonchi, no dullness or hyperresonance to percussion, no tactile fremitus, no subcutaneous emphysema, left pleur-x catheter in place  CARDIOVASCULAR: RRR  GASTROINTESTINAL: Soft, non tender, non distended, no rebound, no guarding  s; bladder without fullness or tenderness on palpation; cervix visualized, without lesion or discharge; palpation of uterus and adnexa without tenderness or mass  PSYCHIATRIC: Appropriate insight/judgment; A+O x 3, mood and affect appropriate, recent/remote memory intact

## 2022-01-14 NOTE — DISCHARGE NOTE PROVIDER - NSDCMRMEDTOKEN_GEN_ALL_CORE_FT
allopurinol 300 mg oral tablet: 1 tab(s) orally once a day  amLODIPine 10 mg oral tablet: 1 tab(s) orally once a day  atorvastatin 20 mg oral tablet: 1 tab(s) orally once a day  clopidogrel 75 mg oral tablet: 1 tab(s) orally once a day  Eliquis: orally 2 times a day  lisinopril 20 mg oral tablet: 1 tab(s) orally once a day  oxyCODONE 5 mg oral capsule: 1 cap(s) orally every 6 hours, As Needed -for severe pain MDD:4 caps   Ventolin HFA 90 mcg/inh inhalation aerosol: 1 puff(s) inhaled 2 times a day, As Needed   allopurinol 300 mg oral tablet: 1 tab(s) orally once a day  amLODIPine 10 mg oral tablet: 1 tab(s) orally once a day  atorvastatin 20 mg oral tablet: 1 tab(s) orally once a day  clopidogrel 75 mg oral tablet: 1 tab(s) orally once a day  Eliquis: orally 2 times a day  lisinopril 20 mg oral tablet: 1 tab(s) orally once a day  oxyCODONE 5 mg oral capsule: 1 cap(s) orally every 4 hours, As Needed  -for severe pain MDD:6  caps  Tylenol 325 mg oral tablet: 2 tab(s) orally every 4 hours, As Needed for mild pain  Ventolin HFA 90 mcg/inh inhalation aerosol: 1 puff(s) inhaled 2 times a day, As Needed   allopurinol 300 mg oral tablet: 1 tab(s) orally once a day  amLODIPine 10 mg oral tablet: 1 tab(s) orally once a day  atorvastatin 20 mg oral tablet: 1 tab(s) orally once a day  clopidogrel 75 mg oral tablet: 1 tab(s) orally once a day  CXR: CXR PA/LAT  s/p vats with pleurX placement  Eliquis: orally 2 times a day  lisinopril 20 mg oral tablet: 1 tab(s) orally once a day  Non Med: Home PT  oxyCODONE 5 mg oral capsule: 1 cap(s) orally every 4 hours, As Needed  -for severe pain MDD:6  caps  Tylenol 325 mg oral tablet: 2 tab(s) orally every 4 hours, As Needed for mild pain  Ventolin HFA 90 mcg/inh inhalation aerosol: 1 puff(s) inhaled 2 times a day, As Needed

## 2022-01-14 NOTE — DISCHARGE NOTE PROVIDER - NSDCCPCAREPLAN_GEN_ALL_CORE_FT
PRINCIPAL DISCHARGE DIAGNOSIS  Diagnosis: Malignant pleural effusion  Assessment and Plan of Treatment:

## 2022-01-14 NOTE — DISCHARGE NOTE NURSING/CASE MANAGEMENT/SOCIAL WORK - NSDPDISTO_GEN_ALL_CORE
Pt  with Pleurix chest tube, VS stable Afebrile. pt with positive bowel sounds kenneth po diet. NC O2 2 liters/min. No resp distress noted. Home O2 delivered as per safe Home care Dc plan. Voiding. Seen by MD and cleared fo Dc to home as per safe Dc plan./Home

## 2022-01-14 NOTE — DISCHARGE NOTE PROVIDER - NSDCFUADDAPPT_GEN_ALL_CORE_FT
Please call Dr Peterson's office to schedule follow up visit in 1-2 weeks. Please have a chest xray done 1-2 days prior to your visit and bring a copy of the xray with you on the day of your appointment

## 2022-01-14 NOTE — DISCHARGE NOTE PROVIDER - NSDCFUADDINST_GEN_ALL_CORE_FT
Please do not drive if you are taking pain medication, Take a laxative as needed for constipation. Visiting nurse will see you three times weekly to drain pleurx and change dressing. Please call Dr Peterson's office if you develop shortness of breath, chest pain, fever or wound redness/foul drainage.  Please do not drive if you are taking pain medication, Take a laxative as needed for constipation. Visiting nurse will see you three times weekly to drain pleurx and change dressing.   You are also being discharged with supplemental oxygen via nasal canula. Continue to use it daily as instructed at 2L.   Please call Dr Peterson's office if you develop shortness of breath, chest pain, fever or wound redness/foul drainage.

## 2022-01-14 NOTE — ASU PATIENT PROFILE, ADULT - FALL HARM RISK - UNIVERSAL INTERVENTIONS
Bed in lowest position, wheels locked, appropriate side rails in place/Call bell, personal items and telephone in reach/Instruct patient to call for assistance before getting out of bed or chair/Non-slip footwear when patient is out of bed/[place] to call system/Physically safe environment - no spills, clutter or unnecessary equipment/Purposeful Proactive Rounding/Room/bathroom lighting operational, light cord in reach

## 2022-01-14 NOTE — CHART NOTE - NSCHARTNOTEFT_GEN_A_CORE
Patient s/p flex bronch, left vats, pleurx cath insertion.  Patient resting comfortably, IV PCA in place.  Pleurx cath to pleurovac connected to water seal.  Pleurx site with dressing clean and dry.  Patient tolerating po, voiding.    Vital Signs Last 24 Hrs  T(C): 36.4 (14 Jan 2022 17:49), Max: 36.8 (14 Jan 2022 08:10)  T(F): 97.6 (14 Jan 2022 17:49), Max: 98.3 (14 Jan 2022 08:10)  HR: 86 (14 Jan 2022 17:49) (56 - 86)  BP: 135/67 (14 Jan 2022 17:49) (127/72 - 174/58)  BP(mean): 81 (14 Jan 2022 14:00) (79 - 91)  RR: 17 (14 Jan 2022 17:49) (7 - 19)  SpO2: 97% (14 Jan 2022 17:49) (93% - 100%)    I&O's Detail    14 Jan 2022 07:01  -  14 Jan 2022 22:42  --------------------------------------------------------  IN:    Lactated Ringers: 90 mL    Oral Fluid: 200 mL  Total IN: 290 mL    OUT:    Chest Tube (mL): 120 mL    Voided (mL): 0 mL  Total OUT: 120 mL    Total NET: 170 mL      MEDICATIONS  (STANDING):  allopurinol 300 milliGRAM(s) Oral daily  atorvastatin 20 milliGRAM(s) Oral at bedtime  heparin   Injectable 5000 Unit(s) SubCutaneous every 8 hours  HYDROmorphone PCA (1 mG/mL) 30 milliLiter(s) PCA Continuous PCA Continuous  lactated ringers. 1000 milliLiter(s) (30 mL/Hr) IV Continuous <Continuous>  polyethylene glycol 3350 17 Gram(s) Oral daily  senna 2 Tablet(s) Oral at bedtime    A/P: S/P Flex Bronch, Left Vats, Pleurx cath insertion  Continue pleurx to water seal   Follow up chest x-ray and labs in am   Chest PT, ambulation and incentive spirometer  Continue IV PCA for pain management

## 2022-01-14 NOTE — DISCHARGE NOTE PROVIDER - NSDCACTIVITY_GEN_ALL_CORE
No restrictions Do not drive or operate machinery/Showering allowed/Do not make important decisions/Stairs allowed/Walking - Indoors allowed/No heavy lifting/straining/Walking - Outdoors allowed/Follow Instructions Provided by your Surgical Team

## 2022-01-14 NOTE — DISCHARGE NOTE NURSING/CASE MANAGEMENT/SOCIAL WORK - NSSCCARECORD_GEN_ALL_CORE
Home Care Agency/Durable Medical Equipment Agency Home Care Agency/Durable Medical Equipment Agency/[facility]

## 2022-01-14 NOTE — DISCHARGE NOTE PROVIDER - NSDCCPTREATMENT_GEN_ALL_CORE_FT
PRINCIPAL PROCEDURE  Procedure: VATS, with PleurX catheter system insertion, pleural cavity  Findings and Treatment:

## 2022-01-14 NOTE — DISCHARGE NOTE PROVIDER - HOSPITAL COURSE
76M PMHx of HTN, HLD, right carotid stenosis s/p CEA on plavix and PAD s/p stents an superficial thrombophlebitis of right leg on eliquis presented with recurrent malignant effusion 2/2 hilar mass. Patient admitted through [facility] for elective flexible bronchoscopy, Left VATS with pleural biopsy, drainage of pleural effusion and insertion of Pleur-X catheter in left pleural space. Patient underwent procedure without any complications and recovered appropriately in PACU. Patient was transferred to the floor the same day for further postoperative management with Pleur-x catheter to water seal. Home medications were restarted on POD1 with the exception of Plavix and Eliquis of which SQ heparin was started instead as DVT ppx. Patient recovering well and deemed stable for discharge with Pleur-X catheter and to restart Plavix and Eliquis upon discharge. Patient plan discussed with and approved by attending, Dr. Onel Peterson MD 76M PMHx of HTN, HLD, right carotid stenosis s/p CEA on plavix and PAD s/p stents an superficial thrombophlebitis of right leg on eliquis presented with recurrent malignant effusion 2/2 hilar mass. Patient admitted through [facility] for elective flexible bronchoscopy, Left VATS with pleural biopsy, drainage of pleural effusion and insertion of Pleur-X catheter in left pleural space. Patient underwent procedure without any complications and recovered appropriately in PACU. Patient was transferred to the floor the same day for further postoperative management with Pleur-x catheter to water seal. Home medications were restarted on POD1 with the exception of Plavix and Eliquis of which SQ heparin was started instead as DVT ppx. Patient recovering well and deemed stable for discharge with Pleur-X catheter and with Home O2 and to restart Plavix and Eliquis upon discharge. Patient plan discussed with and approved by attending, Dr. Onel Peterson MD 76M PMHx of HTN, HLD, right carotid stenosis s/p CEA on plavix and PAD s/p stents an superficial thrombophlebitis of right leg on eliquis presented with recurrent malignant effusion 2/2 hilar mass. Patient admitted through [facility] for elective flexible bronchoscopy, Left VATS with pleural biopsy, drainage of pleural effusion and insertion of Pleur-X catheter in left pleural space. Patient underwent procedure without any complications and recovered appropriately in PACU. Patient was transferred to the floor the same day for further postoperative management with Pleur-x catheter to water seal. Home medications were restarted on POD1 with the exception of Plavix and Eliquis of which SQ heparin was started instead as DVT ppx. Patient recovering well and deemed stable for discharge with Pleur-X catheter and with Home O2 and to restart Plavix and Eliquis upon discharge. Patient plan discussed with and approved by attending, Dr. Onel Peterson MD    Pleurx drained 1/17 am, I liter serosanguinous fluid. Home oxygen arranged as pt desaturates with ambulation    01-17    137  |  102  |  20  ----------------------------<  105<H>  4.6   |  29  |  1.53<H>    Ca    8.6      17 Jan 2022 06:35  Phos  3.5     01-17  Mg     2.20     01-17    TPro  6.3  /  Alb  2.9<L>  /  TBili  0.3  /  DBili  x   /  AST  28  /  ALT  47<H>  /  AlkPhos  117  01-17                          11.3   9.83  )-----------( 231      ( 16 Jan 2022 07:13 )             36.4      76M PMHx of HTN, HLD, right carotid stenosis s/p CEA on plavix and PAD s/p stents an superficial thrombophlebitis of right leg on eliquis presented with recurrent malignant effusion 2/2 hilar mass. Patient admitted through [facility] for elective flexible bronchoscopy, Left VATS with pleural biopsy, drainage of pleural effusion and insertion of Pleur-X catheter in left pleural space. Patient underwent procedure without any complications and recovered appropriately in PACU. Patient was transferred to the floor the same day for further postoperative management with Pleur-x catheter to water seal. Home medications were restarted on POD1 with the exception of Plavix and Eliquis of which SQ heparin was started instead as DVT ppx. Patient recovering well and deemed stable for discharge with Pleur-X catheter and with Home O2 and to restart Plavix and Eliquis upon discharge. Patient plan discussed with and approved by attending, Dr. Onel Peterson MD    Pleurx drained 1/17 am, I liter serosanguinous fluid. Home oxygen arranged as pt desaturates with ambulation. Hypoxia 2/2 advanced Stage IV lung cancer and malignant effusion    01-17    137  |  102  |  20  ----------------------------<  105<H>  4.6   |  29  |  1.53<H>    Ca    8.6      17 Jan 2022 06:35  Phos  3.5     01-17  Mg     2.20     01-17    TPro  6.3  /  Alb  2.9<L>  /  TBili  0.3  /  DBili  x   /  AST  28  /  ALT  47<H>  /  AlkPhos  117  01-17                          11.3   9.83  )-----------( 231      ( 16 Jan 2022 07:13 )             36.4

## 2022-01-14 NOTE — DISCHARGE NOTE PROVIDER - CARE PROVIDER_API CALL
Onel Peterson)  Surgery; Thoracic Surgery  [phone] [place] [address], [place] [address]  Phone: [phone]  Fax: [phone]  Established Patient  Follow Up Time: 1 week

## 2022-01-15 LAB
ANION GAP SERPL CALC-SCNC: 10 MMOL/L — SIGNIFICANT CHANGE UP (ref 7–14)
ANION GAP SERPL CALC-SCNC: 9 MMOL/L — SIGNIFICANT CHANGE UP (ref 7–14)
BASOPHILS # BLD AUTO: 0.03 K/UL — SIGNIFICANT CHANGE UP (ref 0–0.2)
BASOPHILS NFR BLD AUTO: 0.3 % — SIGNIFICANT CHANGE UP (ref 0–2)
BUN SERPL-MCNC: 27 MG/DL — HIGH (ref 7–23)
BUN SERPL-MCNC: 27 MG/DL — HIGH (ref 7–23)
CALCIUM SERPL-MCNC: 8 MG/DL — LOW (ref 8.4–10.5)
CALCIUM SERPL-MCNC: 8.4 MG/DL — SIGNIFICANT CHANGE UP (ref 8.4–10.5)
CHLORIDE SERPL-SCNC: 103 MMOL/L — SIGNIFICANT CHANGE UP (ref 98–107)
CHLORIDE SERPL-SCNC: 106 MMOL/L — SIGNIFICANT CHANGE UP (ref 98–107)
CO2 SERPL-SCNC: 22 MMOL/L — SIGNIFICANT CHANGE UP (ref 22–31)
CO2 SERPL-SCNC: 23 MMOL/L — SIGNIFICANT CHANGE UP (ref 22–31)
CREAT SERPL-MCNC: 1.49 MG/DL — HIGH (ref 0.5–1.3)
CREAT SERPL-MCNC: 1.63 MG/DL — HIGH (ref 0.5–1.3)
EOSINOPHIL # BLD AUTO: 0.03 K/UL — SIGNIFICANT CHANGE UP (ref 0–0.5)
EOSINOPHIL NFR BLD AUTO: 0.3 % — SIGNIFICANT CHANGE UP (ref 0–6)
GLUCOSE SERPL-MCNC: 143 MG/DL — HIGH (ref 70–99)
GLUCOSE SERPL-MCNC: 98 MG/DL — SIGNIFICANT CHANGE UP (ref 70–99)
HCT VFR BLD CALC: 38.1 % — LOW (ref 39–50)
HGB BLD-MCNC: 12 G/DL — LOW (ref 13–17)
IANC: 8.56 K/UL — HIGH (ref 1.5–8.5)
IMM GRANULOCYTES NFR BLD AUTO: 0.5 % — SIGNIFICANT CHANGE UP (ref 0–1.5)
LYMPHOCYTES # BLD AUTO: 1.04 K/UL — SIGNIFICANT CHANGE UP (ref 1–3.3)
LYMPHOCYTES # BLD AUTO: 9.8 % — LOW (ref 13–44)
MCHC RBC-ENTMCNC: 29.2 PG — SIGNIFICANT CHANGE UP (ref 27–34)
MCHC RBC-ENTMCNC: 31.5 GM/DL — LOW (ref 32–36)
MCV RBC AUTO: 92.7 FL — SIGNIFICANT CHANGE UP (ref 80–100)
MONOCYTES # BLD AUTO: 0.86 K/UL — SIGNIFICANT CHANGE UP (ref 0–0.9)
MONOCYTES NFR BLD AUTO: 8.1 % — SIGNIFICANT CHANGE UP (ref 2–14)
NEUTROPHILS # BLD AUTO: 8.56 K/UL — HIGH (ref 1.8–7.4)
NEUTROPHILS NFR BLD AUTO: 81 % — HIGH (ref 43–77)
NRBC # BLD: 0 /100 WBCS — SIGNIFICANT CHANGE UP
NRBC # FLD: 0 K/UL — SIGNIFICANT CHANGE UP
PLATELET # BLD AUTO: 248 K/UL — SIGNIFICANT CHANGE UP (ref 150–400)
POTASSIUM SERPL-MCNC: 4.4 MMOL/L — SIGNIFICANT CHANGE UP (ref 3.5–5.3)
POTASSIUM SERPL-MCNC: 5.1 MMOL/L — SIGNIFICANT CHANGE UP (ref 3.5–5.3)
POTASSIUM SERPL-SCNC: 4.4 MMOL/L — SIGNIFICANT CHANGE UP (ref 3.5–5.3)
POTASSIUM SERPL-SCNC: 5.1 MMOL/L — SIGNIFICANT CHANGE UP (ref 3.5–5.3)
RBC # BLD: 4.11 M/UL — LOW (ref 4.2–5.8)
RBC # FLD: 14.9 % — HIGH (ref 10.3–14.5)
SODIUM SERPL-SCNC: 135 MMOL/L — SIGNIFICANT CHANGE UP (ref 135–145)
SODIUM SERPL-SCNC: 138 MMOL/L — SIGNIFICANT CHANGE UP (ref 135–145)
WBC # BLD: 10.57 K/UL — HIGH (ref 3.8–10.5)
WBC # FLD AUTO: 10.57 K/UL — HIGH (ref 3.8–10.5)

## 2022-01-15 PROCEDURE — 71045 X-RAY EXAM CHEST 1 VIEW: CPT | Mod: 26,76

## 2022-01-15 RX ORDER — OXYCODONE HYDROCHLORIDE 5 MG/1
5 TABLET ORAL EVERY 4 HOURS
Refills: 0 | Status: DISCONTINUED | OUTPATIENT
Start: 2022-01-15 | End: 2022-01-17

## 2022-01-15 RX ORDER — ACETAMINOPHEN 500 MG
650 TABLET ORAL EVERY 4 HOURS
Refills: 0 | Status: DISCONTINUED | OUTPATIENT
Start: 2022-01-15 | End: 2022-01-17

## 2022-01-15 RX ORDER — SODIUM CHLORIDE 9 MG/ML
500 INJECTION INTRAMUSCULAR; INTRAVENOUS; SUBCUTANEOUS ONCE
Refills: 0 | Status: COMPLETED | OUTPATIENT
Start: 2022-01-15 | End: 2022-01-15

## 2022-01-15 RX ORDER — OXYCODONE HYDROCHLORIDE 5 MG/1
1 TABLET ORAL
Qty: 30 | Refills: 0
Start: 2022-01-15 | End: 2022-01-19

## 2022-01-15 RX ADMIN — HYDROMORPHONE HYDROCHLORIDE 30 MILLILITER(S): 2 INJECTION INTRAMUSCULAR; INTRAVENOUS; SUBCUTANEOUS at 08:04

## 2022-01-15 RX ADMIN — LISINOPRIL 20 MILLIGRAM(S): 2.5 TABLET ORAL at 05:57

## 2022-01-15 RX ADMIN — HEPARIN SODIUM 5000 UNIT(S): 5000 INJECTION INTRAVENOUS; SUBCUTANEOUS at 13:21

## 2022-01-15 RX ADMIN — POLYETHYLENE GLYCOL 3350 17 GRAM(S): 17 POWDER, FOR SOLUTION ORAL at 10:40

## 2022-01-15 RX ADMIN — HEPARIN SODIUM 5000 UNIT(S): 5000 INJECTION INTRAVENOUS; SUBCUTANEOUS at 05:57

## 2022-01-15 RX ADMIN — ATORVASTATIN CALCIUM 20 MILLIGRAM(S): 80 TABLET, FILM COATED ORAL at 22:38

## 2022-01-15 RX ADMIN — Medication 300 MILLIGRAM(S): at 13:22

## 2022-01-15 RX ADMIN — SODIUM CHLORIDE 500 MILLILITER(S): 9 INJECTION INTRAMUSCULAR; INTRAVENOUS; SUBCUTANEOUS at 10:45

## 2022-01-15 RX ADMIN — HEPARIN SODIUM 5000 UNIT(S): 5000 INJECTION INTRAVENOUS; SUBCUTANEOUS at 22:38

## 2022-01-15 RX ADMIN — SENNA PLUS 2 TABLET(S): 8.6 TABLET ORAL at 22:39

## 2022-01-15 RX ADMIN — AMLODIPINE BESYLATE 10 MILLIGRAM(S): 2.5 TABLET ORAL at 05:57

## 2022-01-15 NOTE — PHYSICAL THERAPY INITIAL EVALUATION ADULT - ADDITIONAL COMMENTS
Pt lives in a basement apartment with his wife and his children live upstairs. Pt has 3 steps and another 5 steps to negotiate inside the home with unilateral railing. Pt has no steps at entrance of his home.

## 2022-01-15 NOTE — PHYSICAL THERAPY INITIAL EVALUATION ADULT - PLANNED THERAPY INTERVENTIONS, PT EVAL
Patient left sitting in chair, in NAD, call bell in reach, all lines intact. LIZZIE Cummins aware/balance training/bed mobility training/gait training/strengthening/transfer training

## 2022-01-15 NOTE — PROGRESS NOTE ADULT - ASSESSMENT
Pt POD#1 s/p pleurx cath for malignant effusion. Was planning to discharge, but pt requiring oxygen, desat to 86% on RA. Also creatinine bumped from 1.4-1.6, gave NS 500cc, repeat pending.   Will d/c tomorrow, F/U creatinine  Pleurx and O2 paperwork complete    Neuro: Pain management  Pulm: Encourage coughing, deep breathing and use of incentive spirometry. Wean off supplemental oxygen as able. Daily CXR.   Cardio: Monitor telemetry/alarms  GI: Tolerating diet. Continue stool softeners.  Renal: monitor urine output, supplement electrolytes as needed  Vasc: Heparin SC/SCDs for DVT prophylaxis  Heme: Stable H/H. .   ID: Off antibiotics. Stable.  Therapy: OOB/ambulate  Tubes: capped  Disposition: cxr in am  Discussed with Cardiothoracic Team at AM rounds.

## 2022-01-15 NOTE — PHYSICAL THERAPY INITIAL EVALUATION ADULT - PERTINENT HX OF CURRENT PROBLEM, REHAB EVAL
76 year old male complaining of dyspnea since 3/20 - intermittent , evaluated . Pt had cxr , 12/6/21 pleural effusion , CT of chest with pleural effusion , lung mass. Pt is s/p thoracentesis 12/9/21, 12/16/21, 1/3/22 atypical findings. Pt has malignant pleural effusion. 76 year old male complaining of dyspnea since 3/20 intermittent, evaluated. Pt had CXR , 12/6/21 pleural effusion, CT of chest with pleural effusion, lung mass. Pt is s/p thoracentesis 12/9/21, 12/16/21, 1/3/22 atypical findings. Pt has malignant pleural effusion.

## 2022-01-15 NOTE — CHART NOTE - NSCHARTNOTEFT_GEN_A_CORE
Unable to wean pt from Oxygen. RA resting sat 84 and 80 ambulating. Oxygen 98% on 2 l nc Unable to wean pt from Oxygen. RA resting sat 86. Oxygen 98% on 2 l nc pt requires continuous oxygen. RA resting sat 86. Oxygen 98% on 2 l nc continuously, dx malignant effusion.

## 2022-01-15 NOTE — PROGRESS NOTE ADULT - ATTENDING COMMENTS
Pain Management Attending Addendum    SUBJECTIVE: Patient doing well with IV PCA    Therapy:    [X] IV PCA         [ ] PRN Analgesics    OBJECTIVE:   [X] Pain appropriately controlled    [ ] Other:    Side Effects:  [X] None	             [ ] Nausea              [ ] Pruritis                	[ ] Other:    ASSESSMENT/PLAN:  Therapy changed to PRN analgesics

## 2022-01-15 NOTE — PHYSICAL THERAPY INITIAL EVALUATION ADULT - DID THE PATIENT HAVE SURGERY?
s/p bronchoscopy, VATS pleural biopsy, insertion pleur X catheter in left pleural space, drainage of pleural effusion/yes

## 2022-01-16 LAB
ANION GAP SERPL CALC-SCNC: 10 MMOL/L — SIGNIFICANT CHANGE UP (ref 7–14)
BUN SERPL-MCNC: 22 MG/DL — SIGNIFICANT CHANGE UP (ref 7–23)
CALCIUM SERPL-MCNC: 8.4 MG/DL — SIGNIFICANT CHANGE UP (ref 8.4–10.5)
CHLORIDE SERPL-SCNC: 104 MMOL/L — SIGNIFICANT CHANGE UP (ref 98–107)
CO2 SERPL-SCNC: 22 MMOL/L — SIGNIFICANT CHANGE UP (ref 22–31)
CREAT SERPL-MCNC: 1.49 MG/DL — HIGH (ref 0.5–1.3)
GLUCOSE SERPL-MCNC: 85 MG/DL — SIGNIFICANT CHANGE UP (ref 70–99)
HCT VFR BLD CALC: 36.4 % — LOW (ref 39–50)
HGB BLD-MCNC: 11.3 G/DL — LOW (ref 13–17)
MCHC RBC-ENTMCNC: 29.4 PG — SIGNIFICANT CHANGE UP (ref 27–34)
MCHC RBC-ENTMCNC: 31 GM/DL — LOW (ref 32–36)
MCV RBC AUTO: 94.5 FL — SIGNIFICANT CHANGE UP (ref 80–100)
NRBC # BLD: 0 /100 WBCS — SIGNIFICANT CHANGE UP
NRBC # FLD: 0 K/UL — SIGNIFICANT CHANGE UP
PLATELET # BLD AUTO: 231 K/UL — SIGNIFICANT CHANGE UP (ref 150–400)
POTASSIUM SERPL-MCNC: 4.6 MMOL/L — SIGNIFICANT CHANGE UP (ref 3.5–5.3)
POTASSIUM SERPL-SCNC: 4.6 MMOL/L — SIGNIFICANT CHANGE UP (ref 3.5–5.3)
RBC # BLD: 3.85 M/UL — LOW (ref 4.2–5.8)
RBC # FLD: 14.6 % — HIGH (ref 10.3–14.5)
SODIUM SERPL-SCNC: 136 MMOL/L — SIGNIFICANT CHANGE UP (ref 135–145)
WBC # BLD: 9.83 K/UL — SIGNIFICANT CHANGE UP (ref 3.8–10.5)
WBC # FLD AUTO: 9.83 K/UL — SIGNIFICANT CHANGE UP (ref 3.8–10.5)

## 2022-01-16 RX ORDER — CLOPIDOGREL BISULFATE 75 MG/1
75 TABLET, FILM COATED ORAL DAILY
Refills: 0 | Status: DISCONTINUED | OUTPATIENT
Start: 2022-01-16 | End: 2022-01-17

## 2022-01-16 RX ORDER — APIXABAN 2.5 MG/1
5 TABLET, FILM COATED ORAL EVERY 12 HOURS
Refills: 0 | Status: DISCONTINUED | OUTPATIENT
Start: 2022-01-16 | End: 2022-01-17

## 2022-01-16 RX ORDER — SODIUM CHLORIDE 9 MG/ML
500 INJECTION INTRAMUSCULAR; INTRAVENOUS; SUBCUTANEOUS ONCE
Refills: 0 | Status: DISCONTINUED | OUTPATIENT
Start: 2022-01-16 | End: 2022-01-16

## 2022-01-16 RX ADMIN — CLOPIDOGREL BISULFATE 75 MILLIGRAM(S): 75 TABLET, FILM COATED ORAL at 14:33

## 2022-01-16 RX ADMIN — ATORVASTATIN CALCIUM 20 MILLIGRAM(S): 80 TABLET, FILM COATED ORAL at 22:15

## 2022-01-16 RX ADMIN — Medication 300 MILLIGRAM(S): at 14:34

## 2022-01-16 RX ADMIN — POLYETHYLENE GLYCOL 3350 17 GRAM(S): 17 POWDER, FOR SOLUTION ORAL at 14:34

## 2022-01-16 RX ADMIN — LISINOPRIL 20 MILLIGRAM(S): 2.5 TABLET ORAL at 05:57

## 2022-01-16 RX ADMIN — HEPARIN SODIUM 5000 UNIT(S): 5000 INJECTION INTRAVENOUS; SUBCUTANEOUS at 05:57

## 2022-01-16 RX ADMIN — APIXABAN 5 MILLIGRAM(S): 2.5 TABLET, FILM COATED ORAL at 18:30

## 2022-01-16 RX ADMIN — AMLODIPINE BESYLATE 10 MILLIGRAM(S): 2.5 TABLET ORAL at 05:58

## 2022-01-16 NOTE — PROGRESS NOTE ADULT - SUBJECTIVE AND OBJECTIVE BOX
Anesthesia Pain Management Service    SUBJECTIVE:  Patient just had chest tube removed.  Patient is doing well with IV PCA and no significant problems reported.     Pain Scale Score	At rest: _0/10__ 	With Activity: ___ 	[X ] Refer to charted pain scores    THERAPY:    [ ] IV PCA Morphine		[ ] 5 mg/mL	[ ] 1 mg/mL  [X ] IV PCA Hydromorphone	[ ] 5 mg/mL	[X ] 1 mg/mL  [ ] IV PCA Fentanyl		[ ] 50 micrograms/mL    Demand dose __0.2_ lockout __6_ (minutes) Continuous Rate _0__ Total: _3.1__   mg used (in past 24 hrs)      MEDICATIONS  (STANDING):  allopurinol 300 milliGRAM(s) Oral daily  amLODIPine   Tablet 10 milliGRAM(s) Oral daily  atorvastatin 20 milliGRAM(s) Oral at bedtime  heparin   Injectable 5000 Unit(s) SubCutaneous every 8 hours  lactated ringers. 1000 milliLiter(s) (30 mL/Hr) IV Continuous <Continuous>  lisinopril 20 milliGRAM(s) Oral daily  polyethylene glycol 3350 17 Gram(s) Oral daily  senna 2 Tablet(s) Oral at bedtime    MEDICATIONS  (PRN):  acetaminophen     Tablet .. 650 milliGRAM(s) Oral every 4 hours PRN Mild Pain (1 - 3)  ALBUTerol    90 MICROgram(s) HFA Inhaler 1 Puff(s) Inhalation two times a day PRN Shortness of Breath and/or Wheezing  oxyCODONE    IR 5 milliGRAM(s) Oral every 4 hours PRN Moderate Pain (4 - 6)      OBJECTIVE:  Patient is sitting up in bed.    Sedation Score:	[ X] Alert	[ ] Drowsy 	[ ] Arousable	[ ] Asleep	[ ] Unresponsive    Side Effects:	[X ] None	[ ] Nausea	[ ] Vomiting	[ ] Pruritus  		[ ] Other:    Vital Signs Last 24 Hrs  T(C): 37.1 (15 Kevin 2022 05:00), Max: 37.1 (15 Kevin 2022 05:00)  T(F): 98.7 (15 Kevin 2022 05:00), Max: 98.7 (15 Kevin 2022 05:00)  HR: 91 (15 Kevin 2022 05:00) (56 - 93)  BP: 156/80 (15 Kevin 2022 05:00) (127/72 - 163/67)  BP(mean): 81 (14 Jan 2022 14:00) (81 - 91)  RR: 17 (15 Kevin 2022 05:00) (12 - 19)  SpO2: 98% (15 Kevin 2022 05:00) (93% - 100%)    ASSESSMENT/ PLAN    Therapy to  be:	[ ] Continue   [ X] Discontinued   [X ] Change to prn Analgesics    Documentation and Verification of current medications:   [X] Done	[ ] Not done, not elligible    Comments: IV Dilaudid PCA discontinued by team and they ordered PRN Oral/IV opioids and/or Adjuvant non-opioid medications.    Progress Note written now but Patient was seen earlier.
Anesthesia Pain Management Service    SUBJECTIVE:  Patient just had chest tube removed.  Patient is doing well with IV PCA and no significant problems reported.     Pain Scale Score	At rest: _0/10__ 	With Activity: ___ 	[X ] Refer to charted pain scores    THERAPY:    [ ] IV PCA Morphine		[ ] 5 mg/mL	[ ] 1 mg/mL  [X ] IV PCA Hydromorphone	[ ] 5 mg/mL	[X ] 1 mg/mL  [ ] IV PCA Fentanyl		[ ] 50 micrograms/mL    Demand dose __0.2_ lockout __6_ (minutes) Continuous Rate _0__ Total: _3.1__   mg used (in past 24 hrs)      MEDICATIONS  (STANDING):  allopurinol 300 milliGRAM(s) Oral daily  amLODIPine   Tablet 10 milliGRAM(s) Oral daily  atorvastatin 20 milliGRAM(s) Oral at bedtime  heparin   Injectable 5000 Unit(s) SubCutaneous every 8 hours  lactated ringers. 1000 milliLiter(s) (30 mL/Hr) IV Continuous <Continuous>  lisinopril 20 milliGRAM(s) Oral daily  polyethylene glycol 3350 17 Gram(s) Oral daily  senna 2 Tablet(s) Oral at bedtime    MEDICATIONS  (PRN):  acetaminophen     Tablet .. 650 milliGRAM(s) Oral every 4 hours PRN Mild Pain (1 - 3)  ALBUTerol    90 MICROgram(s) HFA Inhaler 1 Puff(s) Inhalation two times a day PRN Shortness of Breath and/or Wheezing  oxyCODONE    IR 5 milliGRAM(s) Oral every 4 hours PRN Moderate Pain (4 - 6)      OBJECTIVE:  Patient is sitting up in bed.    Sedation Score:	[ X] Alert	[ ] Drowsy 	[ ] Arousable	[ ] Asleep	[ ] Unresponsive    Side Effects:	[X ] None	[ ] Nausea	[ ] Vomiting	[ ] Pruritus  		[ ] Other:    Vital Signs Last 24 Hrs  T(C): 37.1 (15 Kevin 2022 05:00), Max: 37.1 (15 Kevin 2022 05:00)  T(F): 98.7 (15 Kevin 2022 05:00), Max: 98.7 (15 Kevin 2022 05:00)  HR: 91 (15 Kevin 2022 05:00) (56 - 93)  BP: 156/80 (15 Kevin 2022 05:00) (127/72 - 163/67)  BP(mean): 81 (14 Jan 2022 14:00) (81 - 91)  RR: 17 (15 Kevin 2022 05:00) (12 - 19)  SpO2: 98% (15 Kevin 2022 05:00) (93% - 100%)    ASSESSMENT/ PLAN    Therapy to  be:	[ ] Continue   [ X] Discontinued   [X ] Change to prn Analgesics    Documentation and Verification of current medications:   [X] Done	[ ] Not done, not elligible    Comments: IV Dilaudid PCA discontinued by team and they ordered PRN Oral/IV opioids and/or Adjuvant non-opioid medications.    Progress Note written now but Patient was seen earlier.
Subjective: 75 y/o male seen at bedside this morning, patient utilizing 2L NC and in NAD. Left PleurX in place capped and with dressing applied    Vital Signs:  Vital Signs Last 24 Hrs  T(C): 36.8 (01-16-22 @ 10:18), Max: 36.9 (01-15-22 @ 21:31)  T(F): 98.2 (01-16-22 @ 10:18), Max: 98.5 (01-16-22 @ 02:00)  HR: 75 (01-16-22 @ 10:18) (75 - 90)  BP: 154/58 (01-16-22 @ 10:18) (140/75 - 155/77)  RR: 18 (01-16-22 @ 10:18) (16 - 18)  SpO2: 96% (01-16-22 @ 10:18) (84% - 98%) on (O2)    Pertinent Physical Exam:  Telemetry/Alarms: NSR  General: WN/WD NAD  Neurology: Awake, nonfocal, ROTH x 4  Respiratory: CTA B/L, No wheezing, rales, rhonchi  CV: RRR, S1S2, no murmurs, rubs or gallops  Abdominal: Soft, NT, ND +BS,   Extremities: No edema, + peripheral pulses  Psych: Oriented x 3, normal affect      I&O's Summary    15 Kevin 2022 07:01  -  16 Jan 2022 07:00  --------------------------------------------------------  IN: 0 mL / OUT: 380 mL / NET: -380 mL    16 Jan 2022 07:01  -  16 Jan 2022 10:50  --------------------------------------------------------  IN: 0 mL / OUT: 800 mL / NET: -800 mL        Relevant labs, radiology and Medications reviewed                        11.3   9.83  )-----------( 231      ( 16 Jan 2022 07:13 )             36.4     01-16    136  |  104  |  22  ----------------------------<  85  4.6   |  22  |  1.49<H>    Ca    8.4      16 Jan 2022 07:13        MEDICATIONS  (STANDING):  allopurinol 300 milliGRAM(s) Oral daily  amLODIPine   Tablet 10 milliGRAM(s) Oral daily  apixaban 5 milliGRAM(s) Oral every 12 hours  atorvastatin 20 milliGRAM(s) Oral at bedtime  clopidogrel Tablet 75 milliGRAM(s) Oral daily  lactated ringers. 1000 milliLiter(s) (30 mL/Hr) IV Continuous <Continuous>  lisinopril 20 milliGRAM(s) Oral daily  polyethylene glycol 3350 17 Gram(s) Oral daily  senna 2 Tablet(s) Oral at bedtime  sodium chloride 0.9% Bolus 500 milliLiter(s) IV Bolus once    MEDICATIONS  (PRN):  acetaminophen     Tablet .. 650 milliGRAM(s) Oral every 4 hours PRN Mild Pain (1 - 3)  ALBUTerol    90 MICROgram(s) HFA Inhaler 1 Puff(s) Inhalation two times a day PRN Shortness of Breath and/or Wheezing  oxyCODONE    IR 5 milliGRAM(s) Oral every 4 hours PRN Moderate Pain (4 - 6)      Assessment  75 y/o male with hx of HTN, HLD, R carotid endarderectomy, CAD on plavix, PAD w/ stents on Eliquis.     s/p pleurx cath for malignant effusion on 1/14/22 1/16/22: Pt pending home O2 approval given desaturation requiring supplemental oxygen via NC. Also creatinine bumped from 1.4-1.6, gave NS 500cc, repeat Creatine down to 1.49 and lateral this morning. Pt also to go home with PleurX and VNS. Today will resume plavix and eliquis, stop SQH        PLAN  Neuro: Pain management PRN  Pulm: Encourage coughing, deep breathing and use of incentive spirometry. Wean off supplemental oxygen as able. Daily CXR. Home O2 to be set up . PleurX VNS.   Cardio: Monitor telemetry/alarms. Resume BP meds, resume plavix.   GI: Tolerating diet. Continue stool softeners.  Renal: monitor urine output, supplement electrolytes as needed. Making urine, Creatine down after NS.   Vasc: PAD, resume   Heme: Stable H/H.   ID: Off antibiotics. Stable.  Therapy: OOB/ambulate  Tubes: PleurX capped. VNS for home. If pt here tomorrow consider draining prior to discharge  Disposition: Aim to D/C to home once home o2 is set up  Discussed with Cardiothoracic Team at AM rounds.  
Pt POD#1 s/p pleurx cath for malignant effusion. Was planning to discharge, but pt requiring oxygen, desat to 86% on RA. Also creatinine bumped from 1.4-1.6, gave NS 500cc, repeat pending.       Pt c/o some pain at pleurx site    Vital Signs Last 24 Hrs  T(C): 36.4 (15 Kevin 2022 13:30), Max: 37.1 (15 Kevin 2022 05:00)  T(F): 97.6 (15 Kevin 2022 13:30), Max: 98.7 (15 Kevin 2022 05:00)  HR: 87 (15 Kevin 2022 13:30) (77 - 95)  BP: 140/75 (15 Kevin 2022 13:30) (135/67 - 163/67)  BP(mean): --  RR: 17 (15 Kevin 2022 13:30) (16 - 18)  SpO2: 95% (15 Kevin 2022 13:30) (84% - 99%)    MEDICATIONS  (STANDING):  allopurinol 300 milliGRAM(s) Oral daily  amLODIPine   Tablet 10 milliGRAM(s) Oral daily  atorvastatin 20 milliGRAM(s) Oral at bedtime  heparin   Injectable 5000 Unit(s) SubCutaneous every 8 hours  lactated ringers. 1000 milliLiter(s) (30 mL/Hr) IV Continuous <Continuous>  lisinopril 20 milliGRAM(s) Oral daily  polyethylene glycol 3350 17 Gram(s) Oral daily  senna 2 Tablet(s) Oral at bedtime    MEDICATIONS  (PRN):  acetaminophen     Tablet .. 650 milliGRAM(s) Oral every 4 hours PRN Mild Pain (1 - 3)  ALBUTerol    90 MICROgram(s) HFA Inhaler 1 Puff(s) Inhalation two times a day PRN Shortness of Breath and/or Wheezing  oxyCODONE    IR 5 milliGRAM(s) Oral every 4 hours PRN Moderate Pain (4 - 6)                            12.0   10.57 )-----------( 248      ( 15 Kevin 2022 07:38 )             38.1   01-15    138  |  106  |  27<H>  ----------------------------<  98  5.1   |  22  |  1.63<H>    Ca    8.4      15 Kevin 2022 07:38    I&O's Detail    14 Jan 2022 07:01  -  15 Jan 2022 07:00  --------------------------------------------------------  IN:    Lactated Ringers: 90 mL    Oral Fluid: 200 mL  Total IN: 290 mL    OUT:    Chest Tube (mL): 120 mL    Voided (mL): 300 mL  Total OUT: 420 mL    Total NET: -130 mL      General: WN/WD NAD  Neurology: A&Ox3, nonfocal, ROTH x 4  Eyes: PERRLA/ EOMI, Gross vision intact  ENT/Neck: Neck supple, trachea midline, No JVD, Gross hearing intact  Respiratory: CTA B/L, No wheezing, rales, rhonchi  CV: RRR, S1S2, no murmurs, rubs or gallops  Abdominal: Soft, NT, ND +BS,   Extremities: No edema, + peripheral pulses  Skin: No Rashes, Hematoma, Ecchymosis  Incisions: c/d/i  Pleurx capped  Tubes:

## 2022-01-17 VITALS — RESPIRATION RATE: 18 BRPM | OXYGEN SATURATION: 92 %

## 2022-01-17 LAB
ALBUMIN SERPL ELPH-MCNC: 2.9 G/DL — LOW (ref 3.3–5)
ALP SERPL-CCNC: 117 U/L — SIGNIFICANT CHANGE UP (ref 40–120)
ALT FLD-CCNC: 47 U/L — HIGH (ref 4–41)
ANION GAP SERPL CALC-SCNC: 6 MMOL/L — LOW (ref 7–14)
AST SERPL-CCNC: 28 U/L — SIGNIFICANT CHANGE UP (ref 4–40)
BILIRUB SERPL-MCNC: 0.3 MG/DL — SIGNIFICANT CHANGE UP (ref 0.2–1.2)
BUN SERPL-MCNC: 20 MG/DL — SIGNIFICANT CHANGE UP (ref 7–23)
CALCIUM SERPL-MCNC: 8.6 MG/DL — SIGNIFICANT CHANGE UP (ref 8.4–10.5)
CHLORIDE SERPL-SCNC: 102 MMOL/L — SIGNIFICANT CHANGE UP (ref 98–107)
CO2 SERPL-SCNC: 29 MMOL/L — SIGNIFICANT CHANGE UP (ref 22–31)
CREAT SERPL-MCNC: 1.53 MG/DL — HIGH (ref 0.5–1.3)
GLUCOSE SERPL-MCNC: 105 MG/DL — HIGH (ref 70–99)
MAGNESIUM SERPL-MCNC: 2.2 MG/DL — SIGNIFICANT CHANGE UP (ref 1.6–2.6)
PHOSPHATE SERPL-MCNC: 3.5 MG/DL — SIGNIFICANT CHANGE UP (ref 2.5–4.5)
POTASSIUM SERPL-MCNC: 4.6 MMOL/L — SIGNIFICANT CHANGE UP (ref 3.5–5.3)
POTASSIUM SERPL-SCNC: 4.6 MMOL/L — SIGNIFICANT CHANGE UP (ref 3.5–5.3)
PROT SERPL-MCNC: 6.3 G/DL — SIGNIFICANT CHANGE UP (ref 6–8.3)
SODIUM SERPL-SCNC: 137 MMOL/L — SIGNIFICANT CHANGE UP (ref 135–145)

## 2022-01-17 RX ADMIN — LISINOPRIL 20 MILLIGRAM(S): 2.5 TABLET ORAL at 05:48

## 2022-01-17 RX ADMIN — AMLODIPINE BESYLATE 10 MILLIGRAM(S): 2.5 TABLET ORAL at 05:48

## 2022-01-17 RX ADMIN — APIXABAN 5 MILLIGRAM(S): 2.5 TABLET, FILM COATED ORAL at 05:47

## 2022-01-17 NOTE — CHART NOTE - NSCHARTNOTESELECT_GEN_ALL_CORE
thoracic/Event Note
Oxygen Requirement Thoracic surgery/Event Note
Thoracic Surgery Post Op Note/Event Note

## 2022-01-18 ENCOUNTER — APPOINTMENT (OUTPATIENT)
Dept: THORACIC SURGERY | Facility: CLINIC | Age: 77
End: 2022-01-18

## 2022-01-20 LAB — SURGICAL PATHOLOGY STUDY: SIGNIFICANT CHANGE UP

## 2022-01-25 ENCOUNTER — NON-APPOINTMENT (OUTPATIENT)
Age: 77
End: 2022-01-25

## 2022-01-31 ENCOUNTER — RESULT REVIEW (OUTPATIENT)
Age: 77
End: 2022-01-31

## 2022-01-31 ENCOUNTER — OUTPATIENT (OUTPATIENT)
Dept: OUTPATIENT SERVICES | Facility: HOSPITAL | Age: 77
LOS: 1 days | End: 2022-01-31
Payer: MEDICARE

## 2022-01-31 ENCOUNTER — APPOINTMENT (OUTPATIENT)
Dept: THORACIC SURGERY | Facility: CLINIC | Age: 77
End: 2022-01-31
Payer: MEDICARE

## 2022-01-31 ENCOUNTER — APPOINTMENT (OUTPATIENT)
Dept: RADIOLOGY | Facility: HOSPITAL | Age: 77
End: 2022-01-31

## 2022-01-31 VITALS
SYSTOLIC BLOOD PRESSURE: 131 MMHG | BODY MASS INDEX: 25.61 KG/M2 | DIASTOLIC BLOOD PRESSURE: 73 MMHG | WEIGHT: 187 LBS | HEART RATE: 102 BPM | OXYGEN SATURATION: 95 % | HEIGHT: 71.5 IN | RESPIRATION RATE: 18 BRPM

## 2022-01-31 DIAGNOSIS — Z98.49 CATARACT EXTRACTION STATUS, UNSPECIFIED EYE: Chronic | ICD-10-CM

## 2022-01-31 DIAGNOSIS — Z95.820 PERIPHERAL VASCULAR ANGIOPLASTY STATUS WITH IMPLANTS AND GRAFTS: Chronic | ICD-10-CM

## 2022-01-31 DIAGNOSIS — Z86.79 PERSONAL HISTORY OF OTHER DISEASES OF THE CIRCULATORY SYSTEM: Chronic | ICD-10-CM

## 2022-01-31 DIAGNOSIS — J91.0 MALIGNANT PLEURAL EFFUSION: ICD-10-CM

## 2022-01-31 PROCEDURE — 71046 X-RAY EXAM CHEST 2 VIEWS: CPT | Mod: 26

## 2022-01-31 PROCEDURE — 99213 OFFICE O/P EST LOW 20 MIN: CPT

## 2022-02-16 ENCOUNTER — NON-APPOINTMENT (OUTPATIENT)
Age: 77
End: 2022-02-16

## 2022-03-04 ENCOUNTER — NON-APPOINTMENT (OUTPATIENT)
Age: 77
End: 2022-03-04

## 2022-03-14 ENCOUNTER — RESULT REVIEW (OUTPATIENT)
Age: 77
End: 2022-03-14

## 2022-03-14 ENCOUNTER — APPOINTMENT (OUTPATIENT)
Dept: RADIOLOGY | Facility: HOSPITAL | Age: 77
End: 2022-03-14

## 2022-03-14 ENCOUNTER — OUTPATIENT (OUTPATIENT)
Dept: OUTPATIENT SERVICES | Facility: HOSPITAL | Age: 77
LOS: 1 days | End: 2022-03-14
Payer: MEDICARE

## 2022-03-14 ENCOUNTER — APPOINTMENT (OUTPATIENT)
Dept: THORACIC SURGERY | Facility: CLINIC | Age: 77
End: 2022-03-14
Payer: MEDICARE

## 2022-03-14 VITALS
BODY MASS INDEX: 25.75 KG/M2 | HEIGHT: 71.5 IN | OXYGEN SATURATION: 98 % | HEART RATE: 88 BPM | SYSTOLIC BLOOD PRESSURE: 160 MMHG | RESPIRATION RATE: 18 BRPM | WEIGHT: 188 LBS | DIASTOLIC BLOOD PRESSURE: 70 MMHG

## 2022-03-14 DIAGNOSIS — Z98.49 CATARACT EXTRACTION STATUS, UNSPECIFIED EYE: Chronic | ICD-10-CM

## 2022-03-14 DIAGNOSIS — J91.0 MALIGNANT PLEURAL EFFUSION: ICD-10-CM

## 2022-03-14 DIAGNOSIS — Z86.79 PERSONAL HISTORY OF OTHER DISEASES OF THE CIRCULATORY SYSTEM: Chronic | ICD-10-CM

## 2022-03-14 DIAGNOSIS — Z95.820 PERIPHERAL VASCULAR ANGIOPLASTY STATUS WITH IMPLANTS AND GRAFTS: Chronic | ICD-10-CM

## 2022-03-14 PROCEDURE — 99213 OFFICE O/P EST LOW 20 MIN: CPT

## 2022-03-14 PROCEDURE — 71046 X-RAY EXAM CHEST 2 VIEWS: CPT | Mod: 26

## 2022-03-18 ENCOUNTER — NON-APPOINTMENT (OUTPATIENT)
Age: 77
End: 2022-03-18

## 2022-04-08 DIAGNOSIS — Z01.818 ENCOUNTER FOR OTHER PREPROCEDURAL EXAMINATION: ICD-10-CM

## 2022-05-28 NOTE — ASU PATIENT PROFILE, ADULT - NS PRO PT RIGHT SUPPORT PERSON
same name as above no loss of consciousness, no gait abnormality, no headache, no sensory deficits, and no weakness.

## 2022-05-28 NOTE — ASU PATIENT PROFILE, ADULT - FALL HARM RISK - UNIVERSAL INTERVENTIONS
Bed in lowest position, wheels locked, appropriate side rails in place/Call bell, personal items and telephone in reach/Instruct patient to call for assistance before getting out of bed or chair/Non-slip footwear when patient is out of bed/Jacob to call system/Physically safe environment - no spills, clutter or unnecessary equipment/Purposeful Proactive Rounding/Room/bathroom lighting operational, light cord in reach

## 2022-05-31 ENCOUNTER — OUTPATIENT (OUTPATIENT)
Dept: OUTPATIENT SERVICES | Facility: HOSPITAL | Age: 77
LOS: 1 days | Discharge: HOME | End: 2022-05-31
Payer: MEDICARE

## 2022-05-31 ENCOUNTER — TRANSCRIPTION ENCOUNTER (OUTPATIENT)
Age: 77
End: 2022-05-31

## 2022-05-31 ENCOUNTER — APPOINTMENT (OUTPATIENT)
Dept: THORACIC SURGERY | Facility: HOSPITAL | Age: 77
End: 2022-05-31

## 2022-05-31 VITALS
HEART RATE: 68 BPM | DIASTOLIC BLOOD PRESSURE: 84 MMHG | RESPIRATION RATE: 15 BRPM | OXYGEN SATURATION: 98 % | SYSTOLIC BLOOD PRESSURE: 156 MMHG

## 2022-05-31 VITALS
TEMPERATURE: 98 F | DIASTOLIC BLOOD PRESSURE: 54 MMHG | RESPIRATION RATE: 16 BRPM | HEART RATE: 65 BPM | HEIGHT: 71.5 IN | WEIGHT: 175.93 LBS | SYSTOLIC BLOOD PRESSURE: 141 MMHG | OXYGEN SATURATION: 99 %

## 2022-05-31 DIAGNOSIS — Z95.820 PERIPHERAL VASCULAR ANGIOPLASTY STATUS WITH IMPLANTS AND GRAFTS: Chronic | ICD-10-CM

## 2022-05-31 DIAGNOSIS — Z98.49 CATARACT EXTRACTION STATUS, UNSPECIFIED EYE: Chronic | ICD-10-CM

## 2022-05-31 DIAGNOSIS — J91.0 MALIGNANT PLEURAL EFFUSION: ICD-10-CM

## 2022-05-31 DIAGNOSIS — Z86.79 PERSONAL HISTORY OF OTHER DISEASES OF THE CIRCULATORY SYSTEM: Chronic | ICD-10-CM

## 2022-05-31 PROCEDURE — 32552 REMOVE LUNG CATHETER: CPT

## 2022-05-31 PROCEDURE — 71045 X-RAY EXAM CHEST 1 VIEW: CPT | Mod: 26

## 2022-05-31 RX ORDER — SODIUM CHLORIDE 9 MG/ML
1000 INJECTION, SOLUTION INTRAVENOUS
Refills: 0 | Status: DISCONTINUED | OUTPATIENT
Start: 2022-05-31 | End: 2022-06-14

## 2022-05-31 NOTE — H&P ADULT - HISTORY OF PRESENT ILLNESS
WILMER MORALES is status post Left VATS, drainage of effusion, pleural bx, placement of PleurX catheter and he is here for a post-op visit.   Surgery Date: 01/14/2022   Patient accompanied by daughter.      Active Problems  Malignant pleural effusion (511.81) (J91.0)   · Assessed By: YOKASTA LACEY (Surgery); Last Assessed: 07 Mar 2022    Current Meds  Allopurinol 300 MG Oral Tablet  amLODIPine Besylate 10 MG Oral Tablet  Atorvastatin Calcium 20 MG Oral Tablet  Clopidogrel Bisulfate 75 MG Oral Tablet  Eliquis 5 MG Oral Tablet; Take 1 tablet twice daily  Lisinopril 10 MG Oral Tablet  Meloxicam 15 MG Oral Tablet    Allergies  No Known Drug Allergies      PAST MEDICAL & SURGICAL HISTORY:  HTN (hypertension)  Hepatitis C, chronic  Gout  PAD (peripheral artery disease)  stent times 2; left  and right lower extremitiy , on eliquis  former smoker  CAD (coronary artery disease)  on plavix   H/O carotid stenosis  COVID-19, 3/20  Superficial thrombophlebitis of right leg  1/3/22 right gastronemius vein - started on eliquis  S/P cataract surgery, right 2019  Status post peripheral artery angioplasty with insertion of stent, left and right  2019  right 2020 - endarterectomy

## 2022-05-31 NOTE — ASU DISCHARGE PLAN (ADULT/PEDIATRIC) - ASU DC SPECIAL INSTRUCTIONSFT
Please take Tylenol as needed for pain (1000mg every 6 hours). Follow up with Dr. Peterson in the office in 2 weeks. You can remove dressing in 2 days let water run over dressing and pat dry gently.

## 2022-05-31 NOTE — H&P ADULT - NSHPREVIEWOFSYSTEMS_GEN_ALL_CORE
REVIEW OF SYSTEMS      General:No Weight change/ Fatigue/ HA/Dizzy	  Skin/Breast: No Rashes/ Lesions/ Masses  Ophthalmologic: No Blurry vision/ Glaucoma/ Blindness  ENMT: No Hearing loss/ Drainage/ Lesions	  Respiratory and Thorax: No Cough/ Wheezing/ SOB/ Hemoptysis/ Sputum production  Cardiovascular: No Chest pain/ Palpitations/ Diaphoresis	  Gastrointestinal: No Nausea/ Vomiting/ Constipation/ Appetite Change	  Genitourinary: No Heamturia/ Dysuria/ Frequency change/ Impotence	  Musculoskeletal: No Pain/ Weakness/ Claudication	  Neurological: No Seizures/ TIA/CVA/ Parastesias	  Psychiatric: No Dementia/ Depression/ SI/HI	  Hematology/Lymphatics: No hx of bleeding/ Edema	  Endocrine:No Hyperglycemia/ Hypoglycemia  Allergic/Immunologic: No Anaphylaxis/ Intolerance/ Recent illnesses

## 2022-05-31 NOTE — H&P ADULT - ASSESSMENT
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 Pleurx with minimal drainage here for removal of pleurx.

## 2022-05-31 NOTE — H&P ADULT - NSHPPHYSICALEXAM_GEN_ALL_CORE
Physical Exam  Pulmonary: no respiratory distress and lungs were clear to auscultation bilaterally.   Heart: heart rate was normal and rhythm regular, normal S1 and S2, no gallops, no murmurs and no pericardial rub.   Other Incision: Site: Lt PleurX. The incision is clean, dry and healing well. The incision had no active bleeding, no foul smell, no purulent drainage and no serosanguinous drainage. The surrounding skin was not erythematous, not warm and not tender.   Lower Extremities: no edema

## 2022-05-31 NOTE — H&P ADULT - NS ATTEND AMEND GEN_ALL_CORE FT
Patient seen and examined agree with above note as modified, where appropriate, by me. The risks benefits and alternatives of the procedure were explained to the patient including but not limited to bleeding, recurrence of the effusion, oxygen dependance and shortness of breath.  All of his questions were answered. He demonstrated understanding and freely consented to the procedure.

## 2022-05-31 NOTE — BRIEF OPERATIVE NOTE - NSICDXBRIEFPROCEDURE_GEN_ALL_CORE_FT
PROCEDURES:  Removal, indwelling pleural catheter 31-May-2022 10:58:24 Removal of L PleurX Catheter Iftikhar Jackson

## 2022-05-31 NOTE — ASU DISCHARGE PLAN (ADULT/PEDIATRIC) - NS MD DC FALL RISK RISK
For information on Fall & Injury Prevention, visit: [email or web address] OR  [email or web address] OR  https://www.cdc.gov/steadi/patient.html

## 2022-05-31 NOTE — ASU DISCHARGE PLAN (ADULT/PEDIATRIC) - CARE PROVIDER_API CALL
Onel Peterson)  Surgery; Thoracic Surgery  [phone] [place] [address], [place] [address]  Phone: [phone]  Fax: [phone]  Follow Up Time: 2 weeks

## 2022-05-31 NOTE — PACU DISCHARGE NOTE - AIRWAY PATENCY:
Patient notified of pathology results and follow-up plan.    Copy of pathology results and plan sent to PCP  HM updated  Archana Hitchcock RN 07/15/20 10:26 AM   Satisfactory

## 2022-05-31 NOTE — H&P ADULT - NSHPSOCIALHISTORY_GEN_ALL_CORE
Social History:  · Marital Status	  · Occupation	retired - Sales  · Lives With	spouse     Substance Use History:  · Substance Use Comment	denies illegal drug use     Alcohol Use History:  · Have you ever consumed alcohol	yes...  · Alcohol Type	stopped 25 years ago  · Duration of Alcohol Use (mo/yr)	stopped 25 years ago     Tobacco Usage:  · Tobacco Usage: Former smoker  · Tobacco Type: cigarettes  quit 4 years ago  · Number of Packs per Day: 1  · Number of yrs: 50  · Pack yrs: 48

## 2022-06-10 PROBLEM — J91.0 MALIGNANT PLEURAL EFFUSION: Status: ACTIVE | Noted: 2022-01-07

## 2022-06-13 ENCOUNTER — OUTPATIENT (OUTPATIENT)
Dept: OUTPATIENT SERVICES | Facility: HOSPITAL | Age: 77
LOS: 1 days | End: 2022-06-13
Payer: MEDICARE

## 2022-06-13 ENCOUNTER — APPOINTMENT (OUTPATIENT)
Dept: THORACIC SURGERY | Facility: CLINIC | Age: 77
End: 2022-06-13

## 2022-06-13 ENCOUNTER — APPOINTMENT (OUTPATIENT)
Dept: RADIOLOGY | Facility: HOSPITAL | Age: 77
End: 2022-06-13

## 2022-06-13 ENCOUNTER — RESULT REVIEW (OUTPATIENT)
Age: 77
End: 2022-06-13

## 2022-06-13 VITALS
OXYGEN SATURATION: 98 % | HEIGHT: 71.5 IN | WEIGHT: 180 LBS | HEART RATE: 74 BPM | DIASTOLIC BLOOD PRESSURE: 62 MMHG | BODY MASS INDEX: 24.65 KG/M2 | SYSTOLIC BLOOD PRESSURE: 136 MMHG

## 2022-06-13 DIAGNOSIS — J91.0 MALIGNANT PLEURAL EFFUSION: ICD-10-CM

## 2022-06-13 DIAGNOSIS — Z86.79 PERSONAL HISTORY OF OTHER DISEASES OF THE CIRCULATORY SYSTEM: Chronic | ICD-10-CM

## 2022-06-13 DIAGNOSIS — Z98.49 CATARACT EXTRACTION STATUS, UNSPECIFIED EYE: Chronic | ICD-10-CM

## 2022-06-13 DIAGNOSIS — Z95.820 PERIPHERAL VASCULAR ANGIOPLASTY STATUS WITH IMPLANTS AND GRAFTS: Chronic | ICD-10-CM

## 2022-06-13 PROCEDURE — 99214 OFFICE O/P EST MOD 30 MIN: CPT

## 2022-06-13 PROCEDURE — 71046 X-RAY EXAM CHEST 2 VIEWS: CPT | Mod: 26

## 2022-06-23 NOTE — CONSULT LETTER
[Dear  ___] : Dear  [unfilled], [Consult Letter:] : I had the pleasure of evaluating your patient, [unfilled]. [( Thank you for referring [unfilled] for consultation for _____ )] : Thank you for referring [unfilled] for consultation for [unfilled] [Please see my note below.] : Please see my note below. [Consult Closing:] : Thank you very much for allowing me to participate in the care of this patient.  If you have any questions, please do not hesitate to contact me. [Sincerely,] : Sincerely, [FreeTextEntry2] : Dr. Victor Hugo Reyes (Pulm/Ref)\par Dr. Diop (Hem/Onc) [FreeTextEntry3] : Onel Peterson MD \par Attending Surgeon \par Division of Thoracic Surgery \par , Cardiovascular and Thoracic Surgery \par [facility] [place]\par \par

## 2022-06-23 NOTE — PHYSICAL EXAM
[] : no respiratory distress [Auscultation Breath Sounds / Voice Sounds] : lungs were clear to auscultation bilaterally [Heart Rate And Rhythm] : heart rate was normal and rhythm regular [Heart Sounds] : normal S1 and S2 [Heart Sounds Gallop] : no gallops [Murmurs] : no murmurs [Heart Sounds Pericardial Friction Rub] : no pericardial rub [Examination Of The Chest] : the chest was normal in appearance [Chest Visual Inspection Thoracic Asymmetry] : no chest asymmetry [Diminished Respiratory Excursion] : normal chest expansion [FreeTextEntry1] : stitch to left lateral chest wall removed.

## 2022-06-23 NOTE — HISTORY OF PRESENT ILLNESS
[FreeTextEntry1] : Mr. WILMER MORALES, 76 year old male, former smoker, w/ hx of HTN, HLD, gout, carotid stenosis s/p stents?, CAD, DVT on Plavix, PAD s/p stents, HCV s/p treatment with SVR (f/u with hepatology clinic), who c/o dyspnea for 2 weeks, CXR on 12/06/2021 revealed pleural effusion, subsequently CT chest on 12/07/2021 revealed lung mass and pleural effusion, s/p three thoracentesis. 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 Metastatic carcinoma on 12/16/2021\par \par Patient is s/p Left VATS, drainage of effusion, pleural bx, placement of PleurX catheter on 01/14/2022. Path of left pleural bx revealed small cell carcinoma. \par \par Patient is s/p Chemo-Port placement on 01/28/2022. Started chemo on 03/14/2022, completed 4 cycles, now on maintenance Atezolizumab q 3weeks. \par \par Patient is s/p removal of left Pleurx catheter on 05/31/2022. \par CXR today -- reviewed, clear, no pleural effusion, no PTX.\par \par Patient is here today for a follow up. Patient is doing well, no pain, no SOB.

## 2022-06-23 NOTE — ASSESSMENT
[FreeTextEntry1] : Mr. WILMER MORALES, 76 year old male, former smoker, w/ hx of HTN, HLD, gout, carotid stenosis s/p stents?, CAD, DVT on Plavix, PAD s/p stents, HCV s/p treatment with SVR (f/u with hepatology clinic), who c/o dyspnea for 2 weeks, CXR on 12/06/2021 revealed pleural effusion, subsequently CT chest on 12/07/2021 revealed lung mass and pleural effusion, s/p three thoracentesis. 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 Metastatic carcinoma on 12/16/2021\par \par Patient is s/p Left VATS, drainage of effusion, pleural bx, placement of PleurX catheter on 01/14/2022. Path of left pleural bx revealed small cell carcinoma. \par \par Patient is s/p Chemo-Port placement on 01/28/2022. Started chemo on 03/14/2022, completed  4 cycles, now on maintenance Atezolizumab q 3weeks. \par \par Patient is s/p removal of left Pleurx catheter on 05/31/2022. \par \par I have reviewed the patient's medical records and diagnostic images at time of this office consultation and have made the following recommendation:\par 1. Patient should continue f/u with Hem/Onc Dr. Diop.\par 2. RTC as needed.\par \par \par I personally performed the services described in the documentation, reviewed the documentation recorded by the scribe in my presence and it accurately and completely records my words and actions.\par \par I, Xuan Wong NP, am scribing for and the presence of NAOMI Shi, the following sections HISTORY OF PRESENT ILLNESS, PAST MEDICAL/FAMILY/SOCIAL HISTORY; REVIEW OF SYSTEMS; VITAL SIGNS; PHYSICAL EXAM; DISPOSITION.

## 2022-10-03 ENCOUNTER — APPOINTMENT (OUTPATIENT)
Dept: HEPATOLOGY | Facility: CLINIC | Age: 77
End: 2022-10-03

## 2022-10-03 VITALS
DIASTOLIC BLOOD PRESSURE: 84 MMHG | OXYGEN SATURATION: 100 % | TEMPERATURE: 97.8 F | WEIGHT: 181 LBS | HEIGHT: 71.5 IN | SYSTOLIC BLOOD PRESSURE: 156 MMHG | BODY MASS INDEX: 24.79 KG/M2 | HEART RATE: 58 BPM | RESPIRATION RATE: 16 BRPM

## 2022-10-03 DIAGNOSIS — R74.8 ABNORMAL LEVELS OF OTHER SERUM ENZYMES: ICD-10-CM

## 2022-10-03 DIAGNOSIS — Z78.9 OTHER SPECIFIED HEALTH STATUS: ICD-10-CM

## 2022-10-03 DIAGNOSIS — K74.00 HEPATIC FIBROSIS, UNSPECIFIED: ICD-10-CM

## 2022-10-03 DIAGNOSIS — D50.0 IRON DEFICIENCY ANEMIA SECONDARY TO BLOOD LOSS (CHRONIC): ICD-10-CM

## 2022-10-03 DIAGNOSIS — R76.8 OTHER SPECIFIED ABNORMAL IMMUNOLOGICAL FINDINGS IN SERUM: ICD-10-CM

## 2022-10-03 DIAGNOSIS — B18.2 CHRONIC VIRAL HEPATITIS C: ICD-10-CM

## 2022-10-03 PROCEDURE — 99215 OFFICE O/P EST HI 40 MIN: CPT

## 2022-10-03 RX ORDER — AZITHROMYCIN 250 MG/1
250 TABLET, FILM COATED ORAL
Qty: 10 | Refills: 0 | Status: DISCONTINUED | COMMUNITY
Start: 2022-09-12

## 2022-10-03 RX ORDER — FUROSEMIDE 40 MG/1
40 TABLET ORAL
Qty: 30 | Refills: 0 | Status: ACTIVE | COMMUNITY
Start: 2022-05-18

## 2022-10-03 RX ORDER — PROCHLORPERAZINE MALEATE 10 MG/1
10 TABLET ORAL
Qty: 30 | Refills: 0 | Status: DISCONTINUED | COMMUNITY
Start: 2022-04-18

## 2022-10-03 RX ORDER — LISINOPRIL 10 MG/1
10 TABLET ORAL
Refills: 0 | Status: ACTIVE | COMMUNITY

## 2022-10-03 RX ORDER — ALLOPURINOL 300 MG/1
300 TABLET ORAL
Refills: 0 | Status: ACTIVE | COMMUNITY

## 2022-10-03 RX ORDER — NIRMATRELVIR AND RITONAVIR 300-100 MG
20 X 150 MG & KIT ORAL
Qty: 30 | Refills: 0 | Status: DISCONTINUED | COMMUNITY
Start: 2022-06-30

## 2022-10-03 RX ORDER — APIXABAN 5 MG/1
5 TABLET, FILM COATED ORAL
Qty: 60 | Refills: 0 | Status: DISCONTINUED | COMMUNITY
End: 2022-10-03

## 2022-10-03 RX ORDER — ATORVASTATIN CALCIUM 20 MG/1
20 TABLET, FILM COATED ORAL
Refills: 0 | Status: ACTIVE | COMMUNITY

## 2022-10-03 RX ORDER — CLOPIDOGREL BISULFATE 75 MG/1
75 TABLET, FILM COATED ORAL
Refills: 0 | Status: ACTIVE | COMMUNITY

## 2022-10-03 RX ORDER — MELOXICAM 15 MG/1
15 TABLET ORAL
Refills: 0 | Status: ACTIVE | COMMUNITY

## 2022-10-03 RX ORDER — ONDANSETRON 4 MG/1
4 TABLET ORAL
Qty: 9 | Refills: 0 | Status: DISCONTINUED | COMMUNITY
Start: 2022-04-18

## 2022-10-03 RX ORDER — AMLODIPINE BESYLATE 10 MG/1
10 TABLET ORAL
Refills: 0 | Status: ACTIVE | COMMUNITY

## 2022-10-03 RX ORDER — PROMETHAZINE HYDROCHLORIDE AND DEXTROMETHORPHAN HYDROBROMIDE ORAL SOLUTION 15; 6.25 MG/5ML; MG/5ML
6.25-15 SOLUTION ORAL
Qty: 240 | Refills: 0 | Status: DISCONTINUED | COMMUNITY
Start: 2022-09-12

## 2022-10-04 PROBLEM — R74.8 ELEVATED ALKALINE PHOSPHATASE LEVEL: Status: ACTIVE | Noted: 2022-10-04

## 2022-10-04 PROBLEM — R74.8 ELEVATED SERUM ALKALINE PHOSPHATASE LEVEL: Status: ACTIVE | Noted: 2022-10-04

## 2022-10-04 PROBLEM — K74.00 HEPATIC FIBROSIS: Status: ACTIVE | Noted: 2020-02-21

## 2022-10-04 PROBLEM — Z78.9 IMMUNE TO HEPATITIS A: Status: ACTIVE | Noted: 2022-10-04

## 2022-10-04 PROBLEM — D50.0 IRON DEFICIENCY ANEMIA DUE TO CHRONIC BLOOD LOSS: Status: ACTIVE | Noted: 2022-10-04

## 2022-10-04 PROBLEM — R76.8 HEPATITIS B CORE ANTIBODY POSITIVE: Status: ACTIVE | Noted: 2022-10-04

## 2022-10-04 LAB
ALBUMIN SERPL ELPH-MCNC: 4.5 G/DL
ALP BLD-CCNC: 129 U/L
ALT SERPL-CCNC: 22 U/L
ANION GAP SERPL CALC-SCNC: 12 MMOL/L
AST SERPL-CCNC: 22 U/L
BASOPHILS # BLD AUTO: 0.05 K/UL
BASOPHILS NFR BLD AUTO: 0.6 %
BILIRUB SERPL-MCNC: 0.2 MG/DL
BUN SERPL-MCNC: 31 MG/DL
CALCIUM SERPL-MCNC: 9.1 MG/DL
CHLORIDE SERPL-SCNC: 105 MMOL/L
CO2 SERPL-SCNC: 24 MMOL/L
CREAT SERPL-MCNC: 1.37 MG/DL
EGFR: 52 ML/MIN/1.73M2
EOSINOPHIL # BLD AUTO: 0.24 K/UL
EOSINOPHIL NFR BLD AUTO: 3 %
HCT VFR BLD CALC: 37 %
HEPB DNA PCR INT: NOT DETECTED
HEPB DNA PCR LOG: NOT DETECTED LOGIU/ML
HGB BLD-MCNC: 10.8 G/DL
IMM GRANULOCYTES NFR BLD AUTO: 0.4 %
INR PPP: 1.05 RATIO
LYMPHOCYTES # BLD AUTO: 1.67 K/UL
LYMPHOCYTES NFR BLD AUTO: 20.9 %
MAN DIFF?: NORMAL
MCHC RBC-ENTMCNC: 28.5 PG
MCHC RBC-ENTMCNC: 29.2 GM/DL
MCV RBC AUTO: 97.6 FL
MONOCYTES # BLD AUTO: 0.52 K/UL
MONOCYTES NFR BLD AUTO: 6.5 %
NEUTROPHILS # BLD AUTO: 5.48 K/UL
NEUTROPHILS NFR BLD AUTO: 68.6 %
PLATELET # BLD AUTO: 243 K/UL
POTASSIUM SERPL-SCNC: 4.8 MMOL/L
PROT SERPL-MCNC: 7.3 G/DL
PT BLD: 12.3 SEC
RBC # BLD: 3.79 M/UL
RBC # FLD: 17.2 %
SODIUM SERPL-SCNC: 141 MMOL/L
WBC # FLD AUTO: 7.99 K/UL

## 2022-10-04 NOTE — ASSESSMENT
[FreeTextEntry1] : Mr. WILMER MORALES is 77 year old male with fatty Liver and S/p HCV Tx now SVR. 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 (CTSX Dr. Onel Peterson) Ordered MRCP with FA pending.\par \par >> Fibrosis - Fibroscan 02/24/20 that scored a median liver stiffness of 6.4 kPa (F 0-1 disease) Advised to repeat the FS to quantify the Fibrosis/Steatosis if scored.\par >> Varices – EGD ordered to r/o GIB with Anemia noted since 05/2022.\par \par >> HCC Screen: US ab on 01/12/2021 shows Normal results with no lesions. Will repeat ultrasound now along with AFP with labs.\par \par # Fatty Liver – Fibroscan 02/24/20 that scored  dB/m (S 2). Normal weight BMI 24.9 (weighs 181 lbs)\par WILMER was educated about the fatty liver disease. Reviewed the spectrum of disease, the risk of disease progression to developing cirrhosis and the associated complications. Explained the patient may develop liver cancer without cirrhosis and therefore should be under the yearly surveillance with an abdominal ultrasound. Taught back that the best treatment of fatty liver disease is diet and exercise. Discussed the present diet with the patient and recommended the avoidance of fatty foods and to follow a heart healthy diet. Also explained that weight loss may lead to an improvement in the overall underlying liver disease. Taught back the physiology of alcohol abstinence has a important contribution to liver health. \par \par # Hepatitis C (genotype 1a) status post Sovaldi and Olysio on 8/19/14 with SVR. HCV RNA not detected since 02/21/2020. Liver biopsy in 2009 that notes grade 3 stage III disease. Discussed he has had improvement with his liver disease overtime. Discussed Hep C re-infection risks and complications of the disease with patient. Will repeat labs now. \par \par Patient was advised to abstain from alcohol and all illicit drugs, avoid herbal and dietary supplements, limit use of acetaminophen to <2 grams per day, avoid use of nonsteroidal anti-inflammatory drugs (NSAIDs) as these can precipitate renal dysfunction in patients with advanced liver disease, avoid eating any unpasteurized dairy products, and avoid eating raw/steamed oysters or other shellfish to avoid risk of Vibrio infection.\par \par # Immune to HAV/HBV with anti-HBs 203.1 anti-HBc + (01/06/2021). He completed the 2 dose COVID vaccine with no adverse effects.\par \par # Chronic Renal failure - Labs done on 01/07/2021 WDL- CMP except chronic Bun/Cr 28/1.66 with low eGFR 39. \par \par # Colonoscopy: 2019, ordered add on with EGD \par  + Anemia with Hb/Hct 10.6/34.7 > 9.9/31.4 (05/11/2022) with WDL- PLT.\par \par PLAN to follow up 6 months with labs. \par Encouraged to call back in the interim with any issues or concerns so that we can address and assist as required.\par \par

## 2022-10-04 NOTE — PHYSICAL EXAM
[General Appearance - Alert] : alert [General Appearance - Well Nourished] : well nourished [Sclera] : the sclera and conjunctiva were normal [Respiration, Rhythm And Depth] : normal respiratory rhythm and effort [Heart Rate And Rhythm] : heart rate was normal and rhythm regular [Edema] : there was no peripheral edema [Bowel Sounds] : normal bowel sounds [Cervical Lymph Nodes Enlarged Posterior Bilaterally] : posterior cervical [Supraclavicular Lymph Nodes Enlarged Bilaterally] : supraclavicular [Abnormal Walk] : normal gait [Nail Clubbing] : no clubbing  or cyanosis of the fingernails [Skin Color & Pigmentation] : normal skin color and pigmentation [] : no rash [No Focal Deficits] : no focal deficits [Oriented To Time, Place, And Person] : oriented to person, place, and time [Scleral Icterus] : No Scleral Icterus [Spider Angioma] : No spider angioma(s) were observed [Abdominal  Ascites] : no ascites [Ascites Fluid Wave] : no ascites fluid wave [Asterixis] : no asterixis observed [Jaundice] : No jaundice [Hallucinations] : ~T no ~M hallucinations [Delusions] : no ~T delusions [Neck Cervical Mass (___cm)] : no neck mass was observed [FreeTextEntry1] : Rt CW infusive port+

## 2022-10-04 NOTE — HISTORY OF PRESENT ILLNESS
[FreeTextEntry1] : Mr. WILMER MORALES is 77 year old male who presents for the follow up appointment with Hepatic steatosis and fibrosis. Normal weight BMI 24.9 (weighs 181 lbs). He denies any fever, chills, anorexia, weight change, abdominal pain, jaundice, pruritus or fatigue. \par \par Former smoker, w/ Mhx of HTN, HLD, gout, carotid stenosis s/p stents?, CAD, DVT on Plavix, PAD s/p stents, s/p three thoracentesis. Path revealed Metastatic carcinoma on 12/16/2021, s/p Left VATS, PleurX catheter 01/14/2022 to 05/31/2022. Path of left pleural bx revealed small cell carcinoma. s/p Chemo-Port placement on 01/28/2022. Started chemo on 03/14/2022, completed 4 cycles, now on maintenance Atezolizumab q 3weeks. (CTSX Dr. Onel Peterson)\par \par Persistent H/O H/O hepatitis C (genotype 1a) status post Sovaldi and Olysio on 8/19/14 with SVR. Was treated in the past with pegylated interferon and ribavirin and was a relapser. On therapy he developed significant depression and irritability. \par \par Fibroscan 02/24/20 shows 6.4 kPa with F 0-1 disease,  dB/m (S 2). \par Fibroscan from 08/21/20 scored 7.2 kPa with F2 disease,  dB/m (S 2).\par Fibroscan from 07/21/17 scored 5.0 kPa with F0-F1, CAP of 255 dB/m (S1). \par Liver biopsy in 2009 that notes grade 3 stage III disease. \par Immune to HAV/HBV with anti-HBs 203.1 anti-HBc + (01/06/2021)\par \par Labs on 10/03/2022: Isolated Elevation of , Bun/Cr 31/1.37 with Low eGFR 52, Hb/Hct 10.8/37, RBC, MCHC; WDL- PLT, WBC, INR;\par \par Scanned Labs on 05/18/2022: WDL- AST/ALT 12/9, ALP/Tbil 88/0.3, Plt 191, Ferritin 66, Anemia with Hb/Hct 10.6/34.7 > 9.9/31.4 (05/11/2022), Low Iron sat 26%, Ca+ 8.4, and eGFR 57, BUN/Cr  39/1.21. \par \par WNL- US ab on 01/12/2021. Labs done on 01/07/2021 WDL- CMP except chronic Bun/Cr 28/1.66 with low eGFR 39. Low Hb/Hct 11.2/38 with WDL- , AFP 2.2. HCV RNA not detected since 02/21/2020. \par \par WNL- Abdominal US from 04/2019.\par \par

## 2022-10-05 LAB
AFP-TM SERPL-MCNC: <1.8 NG/ML
CANCER AG19-9 SERPL-ACNC: 9 U/ML
CEA SERPL-MCNC: 2.6 NG/ML
HCV RNA SERPL NAA+PROBE-LOG IU: NOT DETECTED LOGIU/ML
HEPC RNA INTERP: NOT DETECTED
PSA FREE FLD-MCNC: 49 %
PSA FREE SERPL-MCNC: 2.18 NG/ML
PSA SERPL-MCNC: 4.42 NG/ML

## 2022-10-10 LAB
ALP BLD-CCNC: 131 IU/L
ALP BONE CFR SERPL: 25 %
ALP INTEST CFR SERPL: 2 %
ALP LIVER CFR SERPL: 73 %

## 2022-10-18 ENCOUNTER — OUTPATIENT (OUTPATIENT)
Dept: OUTPATIENT SERVICES | Facility: HOSPITAL | Age: 77
LOS: 1 days | End: 2022-10-18
Payer: MEDICARE

## 2022-10-18 ENCOUNTER — APPOINTMENT (OUTPATIENT)
Dept: ULTRASOUND IMAGING | Facility: CLINIC | Age: 77
End: 2022-10-18

## 2022-10-18 DIAGNOSIS — K74.00 HEPATIC FIBROSIS, UNSPECIFIED: ICD-10-CM

## 2022-10-18 DIAGNOSIS — Z95.820 PERIPHERAL VASCULAR ANGIOPLASTY STATUS WITH IMPLANTS AND GRAFTS: Chronic | ICD-10-CM

## 2022-10-18 DIAGNOSIS — Z98.49 CATARACT EXTRACTION STATUS, UNSPECIFIED EYE: Chronic | ICD-10-CM

## 2022-10-18 DIAGNOSIS — Z86.79 PERSONAL HISTORY OF OTHER DISEASES OF THE CIRCULATORY SYSTEM: Chronic | ICD-10-CM

## 2022-10-18 PROCEDURE — 76700 US EXAM ABDOM COMPLETE: CPT

## 2022-10-18 PROCEDURE — 76700 US EXAM ABDOM COMPLETE: CPT | Mod: 26

## 2022-10-25 ENCOUNTER — NON-APPOINTMENT (OUTPATIENT)
Age: 77
End: 2022-10-25

## 2022-11-25 ENCOUNTER — APPOINTMENT (OUTPATIENT)
Dept: UROLOGY | Facility: CLINIC | Age: 77
End: 2022-11-25

## 2022-11-25 VITALS
SYSTOLIC BLOOD PRESSURE: 138 MMHG | HEART RATE: 64 BPM | DIASTOLIC BLOOD PRESSURE: 58 MMHG | TEMPERATURE: 97.6 F | RESPIRATION RATE: 17 BRPM | BODY MASS INDEX: 23.96 KG/M2 | WEIGHT: 175 LBS | HEIGHT: 71.5 IN

## 2022-11-25 DIAGNOSIS — R39.9 UNSPECIFIED SYMPTOMS AND SIGNS INVOLVING THE GENITOURINARY SYSTEM: ICD-10-CM

## 2022-11-25 PROCEDURE — 99202 OFFICE O/P NEW SF 15 MIN: CPT

## 2022-11-25 RX ORDER — TADALAFIL 5 MG/1
5 TABLET ORAL
Qty: 60 | Refills: 4 | Status: ACTIVE | COMMUNITY
Start: 2022-11-25 | End: 1900-01-01

## 2022-11-25 RX ORDER — FINASTERIDE 5 MG/1
5 TABLET, FILM COATED ORAL DAILY
Qty: 60 | Refills: 4 | Status: ACTIVE | COMMUNITY
Start: 2022-11-25 | End: 1900-01-01

## 2022-11-25 NOTE — ASSESSMENT
[FreeTextEntry1] : Discussed with the patient that PSA is not alarming and only mildly elevated. \par With regards to his LUTS, will start him on Cialis daily and Finastride and Fu in 6 months.

## 2022-11-25 NOTE — HISTORY OF PRESENT ILLNESS
[Nocturia] : nocturia [Weak Stream] : weak stream [Intermittency] : intermittency [Post-Void Dribbling] : post-void dribbling [FreeTextEntry1] : Patient is a 77 year old gentleman who presents for evaluation of PSA. PSA was 4 .4.last value was 3.1. \par Patient also carries a diagnosis of MELISSA. \par \par  \par \par PMH :  hx of HTN, HLD, gout, carotid stenosis s/p stents?, CAD, DVT on Plavix, PAD s/p stents, HCV s/p treatment with SVR \par  Nocturia  x 4  \par \par PSH : Left VATs in  1/14/2022 is now actively n chemotherapy. \par SH : One pack a day for 40 years. \par FH : No family history of cancer\par \par PVR : 21 cc

## 2022-12-01 ENCOUNTER — APPOINTMENT (OUTPATIENT)
Dept: UROLOGY | Facility: CLINIC | Age: 77
End: 2022-12-01

## 2023-01-01 ENCOUNTER — APPOINTMENT (OUTPATIENT)
Dept: MRI IMAGING | Facility: IMAGING CENTER | Age: 78
End: 2023-01-01

## 2023-01-01 ENCOUNTER — NON-APPOINTMENT (OUTPATIENT)
Age: 78
End: 2023-01-01

## 2023-01-01 ENCOUNTER — OUTPATIENT (OUTPATIENT)
Dept: OUTPATIENT SERVICES | Facility: HOSPITAL | Age: 78
LOS: 1 days | End: 2023-01-01
Payer: MEDICARE

## 2023-01-01 ENCOUNTER — APPOINTMENT (OUTPATIENT)
Dept: MRI IMAGING | Facility: IMAGING CENTER | Age: 78
End: 2023-01-01
Payer: MEDICARE

## 2023-01-01 ENCOUNTER — APPOINTMENT (OUTPATIENT)
Dept: RADIATION ONCOLOGY | Facility: CLINIC | Age: 78
End: 2023-01-01

## 2023-01-01 ENCOUNTER — APPOINTMENT (OUTPATIENT)
Dept: NEUROLOGY | Facility: CLINIC | Age: 78
End: 2023-01-01

## 2023-01-01 ENCOUNTER — APPOINTMENT (OUTPATIENT)
Dept: NEUROLOGY | Facility: CLINIC | Age: 78
End: 2023-01-01
Payer: MEDICARE

## 2023-01-01 ENCOUNTER — APPOINTMENT (OUTPATIENT)
Dept: NEUROSURGERY | Facility: CLINIC | Age: 78
End: 2023-01-01
Payer: MEDICARE

## 2023-01-01 ENCOUNTER — RESULT REVIEW (OUTPATIENT)
Age: 78
End: 2023-01-01

## 2023-01-01 ENCOUNTER — OUTPATIENT (OUTPATIENT)
Dept: OUTPATIENT SERVICES | Facility: HOSPITAL | Age: 78
LOS: 1 days | Discharge: HOME | End: 2023-01-01
Payer: MEDICARE

## 2023-01-01 ENCOUNTER — APPOINTMENT (OUTPATIENT)
Dept: UROLOGY | Facility: CLINIC | Age: 78
End: 2023-01-01
Payer: MEDICARE

## 2023-01-01 ENCOUNTER — APPOINTMENT (OUTPATIENT)
Dept: RADIATION ONCOLOGY | Facility: CLINIC | Age: 78
End: 2023-01-01
Payer: MEDICARE

## 2023-01-01 VITALS
OXYGEN SATURATION: 99 % | TEMPERATURE: 97.7 F | HEART RATE: 66 BPM | DIASTOLIC BLOOD PRESSURE: 76 MMHG | WEIGHT: 186 LBS | HEIGHT: 71.5 IN | SYSTOLIC BLOOD PRESSURE: 157 MMHG | BODY MASS INDEX: 25.47 KG/M2 | RESPIRATION RATE: 16 BRPM

## 2023-01-01 VITALS
RESPIRATION RATE: 16 BRPM | HEIGHT: 71.5 IN | DIASTOLIC BLOOD PRESSURE: 75 MMHG | OXYGEN SATURATION: 96 % | SYSTOLIC BLOOD PRESSURE: 160 MMHG | HEART RATE: 76 BPM | BODY MASS INDEX: 26.02 KG/M2 | WEIGHT: 190 LBS

## 2023-01-01 VITALS
BODY MASS INDEX: 25.47 KG/M2 | SYSTOLIC BLOOD PRESSURE: 130 MMHG | TEMPERATURE: 97.7 F | WEIGHT: 186 LBS | RESPIRATION RATE: 16 BRPM | HEART RATE: 66 BPM | DIASTOLIC BLOOD PRESSURE: 63 MMHG | HEIGHT: 71.5 IN

## 2023-01-01 VITALS
HEIGHT: 71.5 IN | SYSTOLIC BLOOD PRESSURE: 140 MMHG | DIASTOLIC BLOOD PRESSURE: 62 MMHG | OXYGEN SATURATION: 97 % | BODY MASS INDEX: 25.33 KG/M2 | HEART RATE: 73 BPM | RESPIRATION RATE: 16 BRPM | WEIGHT: 185 LBS | TEMPERATURE: 97.9 F

## 2023-01-01 VITALS
RESPIRATION RATE: 16 BRPM | DIASTOLIC BLOOD PRESSURE: 63 MMHG | SYSTOLIC BLOOD PRESSURE: 153 MMHG | BODY MASS INDEX: 26.43 KG/M2 | HEIGHT: 71.5 IN | WEIGHT: 193 LBS | OXYGEN SATURATION: 96 % | HEART RATE: 70 BPM

## 2023-01-01 VITALS
WEIGHT: 185 LBS | TEMPERATURE: 97.7 F | HEART RATE: 66 BPM | BODY MASS INDEX: 25.33 KG/M2 | RESPIRATION RATE: 16 BRPM | DIASTOLIC BLOOD PRESSURE: 65 MMHG | HEIGHT: 71.5 IN | OXYGEN SATURATION: 96 % | SYSTOLIC BLOOD PRESSURE: 149 MMHG

## 2023-01-01 VITALS
WEIGHT: 188 LBS | HEIGHT: 71 IN | RESPIRATION RATE: 17 BRPM | SYSTOLIC BLOOD PRESSURE: 202 MMHG | BODY MASS INDEX: 26.32 KG/M2 | DIASTOLIC BLOOD PRESSURE: 63 MMHG | HEART RATE: 69 BPM

## 2023-01-01 VITALS
RESPIRATION RATE: 16 BRPM | OXYGEN SATURATION: 95 % | HEIGHT: 71.5 IN | SYSTOLIC BLOOD PRESSURE: 158 MMHG | DIASTOLIC BLOOD PRESSURE: 65 MMHG | BODY MASS INDEX: 25.47 KG/M2 | WEIGHT: 186 LBS | HEART RATE: 75 BPM

## 2023-01-01 VITALS
DIASTOLIC BLOOD PRESSURE: 62 MMHG | OXYGEN SATURATION: 98 % | SYSTOLIC BLOOD PRESSURE: 128 MMHG | HEART RATE: 75 BPM | TEMPERATURE: 97.5 F | RESPIRATION RATE: 16 BRPM

## 2023-01-01 VITALS
HEART RATE: 59 BPM | TEMPERATURE: 98.5 F | DIASTOLIC BLOOD PRESSURE: 56 MMHG | SYSTOLIC BLOOD PRESSURE: 138 MMHG | OXYGEN SATURATION: 98 % | WEIGHT: 190 LBS | RESPIRATION RATE: 16 BRPM | HEIGHT: 71.5 IN | BODY MASS INDEX: 26.02 KG/M2

## 2023-01-01 VITALS
WEIGHT: 190 LBS | BODY MASS INDEX: 26.02 KG/M2 | DIASTOLIC BLOOD PRESSURE: 66 MMHG | TEMPERATURE: 97.7 F | OXYGEN SATURATION: 96 % | SYSTOLIC BLOOD PRESSURE: 143 MMHG | HEART RATE: 84 BPM | HEIGHT: 71.5 IN | RESPIRATION RATE: 16 BRPM

## 2023-01-01 DIAGNOSIS — Z95.820 PERIPHERAL VASCULAR ANGIOPLASTY STATUS WITH IMPLANTS AND GRAFTS: Chronic | ICD-10-CM

## 2023-01-01 DIAGNOSIS — Z86.79 PERSONAL HISTORY OF OTHER DISEASES OF THE CIRCULATORY SYSTEM: Chronic | ICD-10-CM

## 2023-01-01 DIAGNOSIS — G93.6 CEREBRAL EDEMA: ICD-10-CM

## 2023-01-01 DIAGNOSIS — C79.31 MALIGNANT NEOPLASM OF UNSPECIFIED PART OF UNSPECIFIED BRONCHUS OR LUNG: ICD-10-CM

## 2023-01-01 DIAGNOSIS — Z98.49 CATARACT EXTRACTION STATUS, UNSPECIFIED EYE: Chronic | ICD-10-CM

## 2023-01-01 DIAGNOSIS — N40.1 BENIGN PROSTATIC HYPERPLASIA WITH LOWER URINARY TRACT SYMPTOMS: ICD-10-CM

## 2023-01-01 DIAGNOSIS — R42 DIZZINESS AND GIDDINESS: ICD-10-CM

## 2023-01-01 DIAGNOSIS — C79.31 SECONDARY MALIGNANT NEOPLASM OF BRAIN: ICD-10-CM

## 2023-01-01 DIAGNOSIS — R20.0 ANESTHESIA OF SKIN: ICD-10-CM

## 2023-01-01 DIAGNOSIS — N40.0 BENIGN PROSTATIC HYPERPLASIA WITHOUT LOWER URINARY TRACT SYMPMS: ICD-10-CM

## 2023-01-01 DIAGNOSIS — H81.23 VESTIBULAR NEURONITIS, BILATERAL: ICD-10-CM

## 2023-01-01 DIAGNOSIS — Z00.8 ENCOUNTER FOR OTHER GENERAL EXAMINATION: ICD-10-CM

## 2023-01-01 DIAGNOSIS — H90.3 SENSORINEURAL HEARING LOSS, BILATERAL: ICD-10-CM

## 2023-01-01 DIAGNOSIS — C34.90 MALIGNANT NEOPLASM OF UNSPECIFIED PART OF UNSPECIFIED BRONCHUS OR LUNG: ICD-10-CM

## 2023-01-01 DIAGNOSIS — R25.2 CRAMP AND SPASM: ICD-10-CM

## 2023-01-01 LAB
DRE ABNORMAL+AA+ARIS: 45 %
DRE ABNORMAL+AFRICAN ANCESTRY: 28 %
DRE ABNORMAL+ARIS: 38 %
DRE ABNORMAL+FAM HIST+AA+ARIS: 56 %
DRE ABNORMAL+FAM HIST+AA: 38 %
DRE ABNORMAL+FAM HIST+ARIS: 50 %
DRE ABNORMAL+FAM HIST: 32 %
DRE ABNORMAL: 22 %
DRE NORMAL+AA+ARIS: 26 %
DRE NORMAL+AFRICAN ANCESTRY: 15 %
DRE NORMAL+ARIS: 21 %
DRE NORMAL+FAM HIST+AA+ARIS: 36 %
DRE NORMAL+FAM HIST+AA: 21 %
DRE NORMAL+FAM HIST+ARIS: 30 %
DRE NORMAL+FAM HIST: 17 %
DRE NORMAL: 11 %
PSA RISK STRATIFICATION INTERP: NORMAL
PSA RISK STRATIFICATION: NORMAL
PSA SERPL-MCNC: 1.17 NG/ML

## 2023-01-01 PROCEDURE — 70553 MRI BRAIN STEM W/O & W/DYE: CPT | Mod: 26

## 2023-01-01 PROCEDURE — 99205 OFFICE O/P NEW HI 60 MIN: CPT

## 2023-01-01 PROCEDURE — 70553 MRI BRAIN STEM W/O & W/DYE: CPT

## 2023-01-01 PROCEDURE — 99213 OFFICE O/P EST LOW 20 MIN: CPT

## 2023-01-01 PROCEDURE — 77334 RADIATION TREATMENT AID(S): CPT | Mod: 26

## 2023-01-01 PROCEDURE — 77300 RADIATION THERAPY DOSE PLAN: CPT | Mod: 26

## 2023-01-01 PROCEDURE — 99215 OFFICE O/P EST HI 40 MIN: CPT

## 2023-01-01 PROCEDURE — 99214 OFFICE O/P EST MOD 30 MIN: CPT

## 2023-01-01 PROCEDURE — A9585: CPT

## 2023-01-01 PROCEDURE — 76498 UNLISTED MR PROCEDURE: CPT

## 2023-01-01 PROCEDURE — 99204 OFFICE O/P NEW MOD 45 MIN: CPT | Mod: 25

## 2023-01-01 PROCEDURE — 77290 THER RAD SIMULAJ FIELD CPLX: CPT | Mod: 26

## 2023-01-01 PROCEDURE — 61796 SRS CRANIAL LESION SIMPLE: CPT

## 2023-01-01 PROCEDURE — 99499A: CUSTOM

## 2023-01-01 PROCEDURE — 77295 3-D RADIOTHERAPY PLAN: CPT | Mod: 26

## 2023-01-01 PROCEDURE — 77432 STEREOTACTIC RADIATION TRMT: CPT

## 2023-01-01 RX ORDER — CLOPIDOGREL 75 MG/1
75 TABLET, FILM COATED ORAL DAILY
Refills: 0 | Status: ACTIVE | COMMUNITY
Start: 2023-01-01

## 2023-01-01 RX ORDER — PREDNISONE 20 MG/1
20 TABLET ORAL DAILY
Refills: 0 | Status: ACTIVE | COMMUNITY
Start: 2023-01-01

## 2023-01-01 RX ORDER — GABAPENTIN 300 MG/1
300 CAPSULE ORAL DAILY
Qty: 120 | Refills: 5 | Status: ACTIVE | COMMUNITY
Start: 2023-01-01 | End: 1900-01-01

## 2023-01-01 RX ORDER — MYCOPHENOLATE MOFETIL 500 MG/1
500 TABLET ORAL TWICE DAILY
Qty: 30 | Refills: 1 | Status: ACTIVE | COMMUNITY
Start: 2023-01-01 | End: 1900-01-01

## 2023-01-01 RX ORDER — DEXAMETHASONE 4 MG/1
4 TABLET ORAL
Qty: 30 | Refills: 1 | Status: DISCONTINUED | COMMUNITY
Start: 2023-01-01 | End: 2023-01-01

## 2023-01-04 ENCOUNTER — RESULT REVIEW (OUTPATIENT)
Age: 78
End: 2023-01-04

## 2023-03-27 NOTE — CARDIOLOGY SUMMARY
[___] : [unfilled] [Normal] : normal Show Applicator Variable?: Yes Render Note In Bullet Format When Appropriate: No Duration Of Freeze Thaw-Cycle (Seconds): 0 Post-Care Instructions: I reviewed with the patient in detail post-care instructions. Patient is to wear sunprotection, and avoid picking at any of the treated lesions. Pt may apply Vaseline to crusted or scabbing areas. Detail Level: Detailed Consent: The patient's consent was obtained including but not limited to risks of crusting, scabbing, blistering, scarring, darker or lighter pigmentary change, recurrence, incomplete removal and infection.

## 2023-04-03 NOTE — CHART NOTE - NSCHARTNOTEFT_GEN_A_CORE
am cxr with left pleural effusion, pleurx drained, 1 liter serosanguinous fluid drained. pt tolerated the procedure well. Oxygen delivered to home and portable tank in pt room. Pt to be discharged home Methotrexate Pregnancy And Lactation Text: This medication is Pregnancy Category X and is known to cause fetal harm. This medication is excreted in breast milk.

## 2023-04-21 NOTE — DISCUSSION/SUMMARY
[FreeTextEntry1] : SCLC with single brain met.  Minimally symptomatic.  We discussed management of his condition including risks/benefits of SRS versus WBRT.  I recommended SRS with WBRT for salvage.  \par \par Coordinated eval with Dr. Lamar for SRS \par Does not need more than 4 mg daily of decadron taper after SRS\par No seizure meds for now\par RTC 2w\par

## 2023-04-21 NOTE — DATA REVIEWED
[de-identified] : Reviewed outside MRI brain showing 2.3cm lesion in the left parietal lobe with mild edema

## 2023-04-21 NOTE — PHYSICAL EXAM
Dr Shell at bedside to OK discharge  
May discuss health history with responsible party  
May discuss health history with responsible party  
[FreeTextEntry1] : \par Exam as below (with documented exceptions in parentheses):\par \par General:  Healthy appearing man well groomed and without deformities. KPS is 90\par \par Mental Status:  Awake, alert and attentive. Oriented to person, place and time. Recent and remote memory intact. Normal concentration. Fluent spontaneous speech with intact naming and repetition. Normal fund of knowledge.  \par \par Cranial Nerves: II: Full visual fields. III,IV,VI:Pupils round, reactive to light. Full extraocular movements. No nystagmus. V: Normal bilateral bite, facial sensation. VII: No facial weakness. VIII: Hearing intact. IX,X: Palate midline, intact gag. XI:  Sternocleidomastoids normal. XII: Tongue protrudes midline. No dysarthria. \par \par Motor:  Normal tone, bulk and power throughout including arms and legs, proximal and distal. No pronator drift. No abnormal movements. \par \par Sensation: Normal in arms, legs and trunk to pin, proprioception and vibration. Negative Romberg. \par \par Coordination: No dysmetria or dysdiadochokinesis bilaterally. Normal heel-shin testing. \par \par Gait: Normal including heel and toe walking. Normal station. \par \par Reflexes: Normoactive and symmetric throughout. Absent Babinski bilaterally. \par \par Vascular: No peripheral edema/calf tenderness. \par \par Eyes: Funduscopic exam without papilledema (deferred)\par \par Heart: Regular rate and rhythm. Normal s1-s2. No murmurs, rubs or gallops. \par \par Respiratory: Lungs clear to auscultation bilaterally. \par \par Abdomen: Nontender, non-distended. No hepatosplenomegaly. Normal bowel sounds in four quadrants. \par \par '

## 2023-04-21 NOTE — HISTORY OF PRESENT ILLNESS
[FreeTextEntry1] : This 77 year old right handed man is seen at the request of Dr. Jadon Mckeon for my advice and opinion regarding brain metastasis\par \par He has SCLC. he presented in 1/2022 with malignant effusion and pleural disease.  He was treated with carbo/etop followed by atezolizumab maintenance.  Recent EOD scans without recurrence.  \par \par About 3 months ago he developed right hand numbness extending up the elbow.  At times, he would drop things.  Handwriting has not changed.  No weakness.  No facial numbness or body numbness.  No language issues, headaches or vision complaints.  MRI brain 4/20/23 showed a left parietal  2.3 cm lesion with mild surrounding edema. \par \par PMH:  PAD.  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  no etoh.  here with wife. \par \par FHX:  no cancers.

## 2023-04-24 NOTE — HISTORY OF PRESENT ILLNESS
[FreeTextEntry1] : Mr. Worthy is a 76 yo male that was initially diagnosed with SCLC in 1/2022.  HE had extensive stage disease at that time with malignant pleural effusion.  No CNS disease.  He was treated with carbo/etoposide followed by atezolizumab maintenance.  He had excellent post-treatment course with disease stability. \par \par Most recently, he presented with right hand numbness extending to his elbow.  Denied any other neurological symptoms and maintains excellent KPS.  MRI was performed for evaluation which identified a single solitary brain metastases.  \par \par At time of this visit, he has not started steroids.  He denies headache, nausea, or vomiting.  I am seeing him today with Dr. Mejia.

## 2023-05-04 NOTE — DISCHARGE NOTE NURSING/CASE MANAGEMENT/SOCIAL WORK - INFLUENZA IMMUNIZATION DATE (APPROXIMATE):
Detail Level: Detailed Quality 402: Tobacco Use And Help With Quitting Among Adolescents: Patient screened for tobacco and never smoked 16-Nov-2021 Quality 226: Preventive Care And Screening: Tobacco Use: Screening And Cessation Intervention: Patient screened for tobacco use and is an ex/non-smoker

## 2023-05-05 PROBLEM — N40.0 BPH (BENIGN PROSTATIC HYPERPLASIA): Status: ACTIVE | Noted: 2019-01-22

## 2023-05-10 NOTE — DATA REVIEWED
[de-identified] : Reviewed outside MRI brain showing 2.3cm lesion in the left parietal lobe with mild edema, minimally incresaed on GK scan from 5/23

## 2023-05-10 NOTE — HISTORY OF PRESENT ILLNESS
[FreeTextEntry1] : This 77 year old right handed man is seen seen in follow up for evaluation and management of  brain metastasis\par \par He has SCLC. he presented in 1/2022 with malignant effusion and pleural disease.  He was treated with carbo/etop followed by atezolizumab maintenance.  Recent EOD scans without recurrence.  \par \par About 3 months ago he developed right hand numbness extending up the elbow.  At times, he would drop things.  Handwriting has not changed.  No weakness.  No facial numbness or body numbness.  No language issues, headaches or vision complaints.  MRI brain 4/20/23 showed a left parietal  2.3 cm lesion with mild surrounding edema. \par \par INTERVAL HISTORY: Treated today with single fraction SRS (18 Gy).  Well tolerated.  No new symptoms, indeed hand improved. \par \par \par PMH:  PAD.  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  no etoh.  here with wife. \par \par FHX:  no cancers.

## 2023-05-10 NOTE — PHYSICAL EXAM
[FreeTextEntry1] : \par Exam as below (with documented exceptions in parentheses):\par \par General:  Healthy appearing man well groomed and without deformities. KPS is 90\par \par Mental Status:  Awake, alert and attentive. Oriented to person, place and time. Recent and remote memory intact. Normal concentration. Fluent spontaneous speech with intact naming and repetition. Normal fund of knowledge.  \par \par Cranial Nerves: II: Full visual fields. III,IV,VI:Pupils round, reactive to light. Full extraocular movements. No nystagmus. V: Normal bilateral bite, facial sensation. VII: No facial weakness. VIII: Hearing intact. IX,X: Palate midline, intact gag. XI:  Sternocleidomastoids normal. XII: Tongue protrudes midline. No dysarthria. \par \par Motor:  Normal tone, bulk and power throughout including arms and legs, proximal and distal. No pronator drift. No abnormal movements. (slowed FFM on right).\par \par Sensation: Normal in arms, legs and trunk to pin, proprioception and vibration. Negative Romberg. \par \par Coordination: No dysmetria or dysdiadochokinesis bilaterally. Normal heel-shin testing. \par \par Gait: Normal including heel and toe walking. Normal station. \par \par Reflexes: Normoactive and symmetric throughout. Absent Babinski bilaterally. \par \par Vascular: No peripheral edema/calf tenderness. \par \par Eyes: Funduscopic exam without papilledema (deferred)\par \par Heart: Regular rate and rhythm. Normal s1-s2. No murmurs, rubs or gallops. \par \par Respiratory: Lungs clear to auscultation bilaterally. \par \par Abdomen: Nontender, non-distended. No hepatosplenomegaly. Normal bowel sounds in four quadrants. \par \par '

## 2023-05-10 NOTE — DISCUSSION/SUMMARY
[FreeTextEntry1] : SCLC with single brain met.  Minimally symptomatic. Recieved 1 treatment of SRS to left parietal lesion. Discussed OT to assist with right hand dexterity but he refuses for now.\par \par Ongoing decadron taper per rad onc\par No seizure meds for now\par Discussed symptoms to monitor and to report any worsening right hand weakness, headaches.\par \par RTC 1month\par \par

## 2023-05-30 NOTE — DISCUSSION/SUMMARY
[FreeTextEntry1] : SCLC with single brain met.  Minimally symptomatics/p SRS. \par Ongoing muscle cramps in bilateral upper extremities possibly due to steroid withdrawal.\par \par Gabapentin 300 mg BID prescribed\par Instructed to call office to report how he is doing on gabapentin and if there is any improvement noted in cramps with medication.\par CMP, magnesium, CK labs ordered.\par \par RTC 2 weeks.\par

## 2023-05-30 NOTE — HISTORY OF PRESENT ILLNESS
[FreeTextEntry1] : This 77 year old right handed man is seen seen in follow up for evaluation and management of  brain metastasis\par \par He has SCLC. he presented in 1/2022 with malignant effusion and pleural disease.  He was treated with carbo/etop followed by atezolizumab maintenance.  Recent EOD scans without recurrence.  He was treated to solitary left frontal lesion with SRS on 5/10/23.\par \par INTERVAL HISTORY: Presents urgently for episodes of BILATERAL hand cramps at night.  Says they are going on for a month, but worse since off steroids.  No neck pain.  He does not have weakness of the hand.  \par \par PMH:  PAD.  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  no etoh.  here with wife. \par \par FHX:  no cancers.

## 2023-05-30 NOTE — DATA REVIEWED
[de-identified] : Reviewed outside MRI brain showing 2.3cm lesion in the left parietal lobe with mild edema, minimally incresaed on GK scan from 5/23

## 2023-06-08 PROBLEM — R25.2 MUSCLE CRAMPS: Status: ACTIVE | Noted: 2023-01-01

## 2023-06-08 NOTE — HISTORY OF PRESENT ILLNESS
[FreeTextEntry1] : This 77 year old right handed man is seen seen in follow up for evaluation and management of  brain metastasis\par \par He has SCLC. he presented in 1/2022 with malignant effusion and pleural disease.  He was treated with carbo/etop followed by atezolizumab maintenance.  Recent EOD scans without recurrence.  He was treated to solitary left frontal lesion with SRS on 5/10/23. He developed hand cramping off steroids. \par \par INTERVAL HISTORY: Cramps improved on GBP.  No new symptoms.  Feels well. \par \par PMH:  PAD.  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  no etoh.  here with wife. \par \par FHX:  no cancers.

## 2023-06-08 NOTE — DATA REVIEWED
[de-identified] : Reviewed outside MRI brain showing 2.3cm lesion in the left parietal lobe with mild edema, minimally incresaed on GK scan from 5/23

## 2023-06-08 NOTE — DISCUSSION/SUMMARY
[FreeTextEntry1] : SCLC with single brain met.  Minimally symptomatics/p SRS. \par Improved muscle cramps in bilateral upper extremities possibly due to steroid withdrawal.\par \par Gabapentin 300 mg BID \par MRI for 3w\par RTC 3 weeks.\par

## 2023-07-12 PROBLEM — G93.6 CEREBRAL EDEMA: Status: ACTIVE | Noted: 2023-01-01

## 2023-07-12 NOTE — HISTORY OF PRESENT ILLNESS
[FreeTextEntry1] : This 77 year old right handed man is seen seen in follow up for evaluation and management of  brain metastasis\par \par He has SCLC. he presented in 1/2022 with malignant effusion and pleural disease.  He was treated with carbo/etop followed by atezolizumab maintenance.  Recent EOD scans without recurrence.  He was treated to solitary left frontal lesion with SRS on 5/10/23. He developed hand cramping off steroids. \par \par INTERVAL HISTORY: Cramps improved on GBP.  Feels hand is a bit clumsy.  Did not schedule his MRI\par \par PMH:  PAD.  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  no etoh.  here with wife. \par \par FHX:  no cancers.

## 2023-07-12 NOTE — DISCUSSION/SUMMARY
[FreeTextEntry1] : SCLC with single brain met.  Minimally symptomatics/p SRS. \par Improved muscle cramps in bilateral upper extremities possibly due to steroid withdrawal.\par \par Gabapentin 300 mg BID \par MRI will be coordinate asap\par RTC 10d\par

## 2023-07-12 NOTE — DATA REVIEWED
[de-identified] : Reviewed outside MRI brain showing 2.3cm lesion in the left parietal lobe with mild edema, minimally incresaed on GK scan from 5/23

## 2023-07-24 NOTE — HISTORY OF PRESENT ILLNESS
[FreeTextEntry1] : \par NEURO-ONCOLOGY\par \par This 77 year old right handed man is seen seen in follow up for evaluation and management of  brain metastasis\par \par He has SCLC. he presented in 1/2022 with malignant effusion and pleural disease.  He was treated with carbo/etop followed by atezolizumab maintenance.  Recent EOD scans without recurrence.  He was treated to solitary left frontal lesion with SRS on 5/10/23. He developed hand cramping off steroids. \par \par INTERVAL HISTORY: Cramps improved on GBP.  MRI done with improved tumor and edema.  No new lesions. \par \par PMH:  PAD.  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  no etoh.  here with wife. \par \par FHX:  no cancers.

## 2023-07-24 NOTE — DISCUSSION/SUMMARY
[FreeTextEntry1] : SCLC with single brain met.  Minimally symptomatic s/p SRS. Good response. \par \par Gabapentin 300 mg BID for cramping\par MRI coordinated for 2mo\par RTC 2mo\par

## 2023-07-24 NOTE — DATA REVIEWED
[de-identified] : Reviewed outside MRI brain showing 2.3cm lesion in the left parietal lobe with mild edema, minimally incresaed on GK scan from 5/23, markedly improved on 7/23 MRI

## 2023-10-25 PROBLEM — R20.0 HAND NUMBNESS: Status: ACTIVE | Noted: 2023-01-01

## 2023-11-15 PROBLEM — C34.90 PRIMARY MALIGNANT NEOPLASM OF LUNG WITH METASTASIS TO BRAIN: Status: ACTIVE | Noted: 2023-01-01

## 2023-12-20 PROBLEM — R42 VERTIGO: Status: ACTIVE | Noted: 2023-01-01

## 2023-12-20 PROBLEM — H81.23 VESTIBULAR NEURONITIS OF BOTH EARS: Status: ACTIVE | Noted: 2023-01-01

## 2023-12-20 PROBLEM — C79.31 MALIGNANT NEOPLASM METASTATIC TO BRAIN: Status: ACTIVE | Noted: 2023-01-01

## 2023-12-20 PROBLEM — H90.3 SENSORINEURAL HEARING LOSS (SNHL), BILATERAL: Status: ACTIVE | Noted: 2019-04-02

## 2023-12-20 NOTE — HISTORY OF PRESENT ILLNESS
[FreeTextEntry1] : NEURO-ONCOLOGY  This 78 year old right handed man is seen in follow up for evaluation and management of brain metastasis  He has SCLC. he presented in 1/2022 with malignant effusion and pleural disease.  He was treated with carbo/etop followed by atezolizumab maintenance.  Recent EOD scans without recurrence.  He was treated to solitary left frontal lesion with SRS on 5/10/23.  He developed bilater vestibular neuritis in 11/23.   INTERVAL HISTORY:  Turns out only taking cellcept 500 daily.  Clinically stable.  Continues on prednisone.  MRI with persistent to increased bilateral nerve enhancement (d/w neuro rads today by phone).  No other foci noted. No back pain.  No headaches.   PMH:  PAD.  SCLC. Sees Dr. Kishore Amador of ENT.  PSH:  LE stents 10 years ago.  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  no etoh.  here with wife.  FHX:  no cancers.

## 2023-12-20 NOTE — DATA REVIEWED
[de-identified] : Reviewed outside MRI brain showing 2.3cm lesion in the left parietal lobe with mild edema, minimally incresaed on GK scan from 5/23, markedly improved on 7/23, further improved on 10/23 MRI, 11/23 MRi with no new mets, but new enhancement of left more than right 8th nerve in skull base, increased bilat on 12/23 MRI without other focus

## 2023-12-20 NOTE — DISCUSSION/SUMMARY
[FreeTextEntry1] : SCLC with single brain met. Excellent response to RT.  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   New vertigo and MRI with bilateral vestibular nerve enhancement but not intracranial.  Favor I/O toxicity over lepto.  Not improved after a month of steroids.  Will need CSF eval and possibly MRI L spine.   Gabapentin 300 mg daily (at his request) Prednisone 40 daily Advised him to increase cellcept to 1000 bid Continue to hold atezo LP coordinated for cell count, p/g, cytology, flow cytometry, paraneoplastic panel, OCB, and VZV PCR RTC 3w

## 2024-01-01 ENCOUNTER — NON-APPOINTMENT (OUTPATIENT)
Age: 79
End: 2024-01-01

## 2024-01-01 ENCOUNTER — APPOINTMENT (OUTPATIENT)
Dept: NEUROLOGY | Facility: CLINIC | Age: 79
End: 2024-01-01

## 2024-01-01 ENCOUNTER — APPOINTMENT (OUTPATIENT)
Dept: RADIATION ONCOLOGY | Facility: CLINIC | Age: 79
End: 2024-01-01

## 2024-01-01 ENCOUNTER — TRANSCRIPTION ENCOUNTER (OUTPATIENT)
Age: 79
End: 2024-01-01

## 2024-01-01 ENCOUNTER — OUTPATIENT (OUTPATIENT)
Dept: OUTPATIENT SERVICES | Facility: HOSPITAL | Age: 79
LOS: 1 days | End: 2024-01-01
Payer: MEDICARE

## 2024-01-01 ENCOUNTER — INPATIENT (INPATIENT)
Facility: HOSPITAL | Age: 79
LOS: 7 days | Discharge: HOME | End: 2024-01-19
Attending: HOSPITALIST | Admitting: HOSPITALIST
Payer: MEDICARE

## 2024-01-01 ENCOUNTER — APPOINTMENT (OUTPATIENT)
Dept: RADIOLOGY | Facility: HOSPITAL | Age: 79
End: 2024-01-01
Payer: MEDICARE

## 2024-01-01 VITALS
DIASTOLIC BLOOD PRESSURE: 65 MMHG | OXYGEN SATURATION: 99 % | HEART RATE: 98 BPM | TEMPERATURE: 98 F | SYSTOLIC BLOOD PRESSURE: 125 MMHG | RESPIRATION RATE: 16 BRPM

## 2024-01-01 VITALS
OXYGEN SATURATION: 99 % | HEART RATE: 66 BPM | SYSTOLIC BLOOD PRESSURE: 163 MMHG | RESPIRATION RATE: 19 BRPM | DIASTOLIC BLOOD PRESSURE: 73 MMHG | TEMPERATURE: 98 F

## 2024-01-01 DIAGNOSIS — J18.9 PNEUMONIA, UNSPECIFIED ORGANISM: ICD-10-CM

## 2024-01-01 DIAGNOSIS — R29.898 OTHER SYMPTOMS AND SIGNS INVOLVING THE MUSCULOSKELETAL SYSTEM: ICD-10-CM

## 2024-01-01 DIAGNOSIS — C34.90 MALIGNANT NEOPLASM OF UNSPECIFIED PART OF UNSPECIFIED BRONCHUS OR LUNG: ICD-10-CM

## 2024-01-01 DIAGNOSIS — Z98.49 CATARACT EXTRACTION STATUS, UNSPECIFIED EYE: Chronic | ICD-10-CM

## 2024-01-01 DIAGNOSIS — Z86.79 PERSONAL HISTORY OF OTHER DISEASES OF THE CIRCULATORY SYSTEM: Chronic | ICD-10-CM

## 2024-01-01 DIAGNOSIS — Z95.820 PERIPHERAL VASCULAR ANGIOPLASTY STATUS WITH IMPLANTS AND GRAFTS: Chronic | ICD-10-CM

## 2024-01-01 DIAGNOSIS — C79.31 SECONDARY MALIGNANT NEOPLASM OF BRAIN: ICD-10-CM

## 2024-01-01 DIAGNOSIS — R53.1 WEAKNESS: ICD-10-CM

## 2024-01-01 DIAGNOSIS — N17.9 ACUTE KIDNEY FAILURE, UNSPECIFIED: ICD-10-CM

## 2024-01-01 DIAGNOSIS — Z29.9 ENCOUNTER FOR PROPHYLACTIC MEASURES, UNSPECIFIED: ICD-10-CM

## 2024-01-01 DIAGNOSIS — I10 ESSENTIAL (PRIMARY) HYPERTENSION: ICD-10-CM

## 2024-01-01 LAB
24R-OH-CALCIDIOL SERPL-MCNC: 11.2 NG/ML — LOW (ref 30–80)
A1C WITH ESTIMATED AVERAGE GLUCOSE RESULT: 6.1 % — HIGH (ref 4–5.6)
ALBUMIN SERPL ELPH-MCNC: 2.5 G/DL — LOW (ref 3.3–5)
ALBUMIN SERPL ELPH-MCNC: 2.5 G/DL — LOW (ref 3.3–5)
ALBUMIN SERPL ELPH-MCNC: 2.6 G/DL — LOW (ref 3.3–5)
ALBUMIN SERPL ELPH-MCNC: 2.8 G/DL — LOW (ref 3.3–5)
ALBUMIN SERPL ELPH-MCNC: 2.8 G/DL — LOW (ref 3.3–5)
ALP SERPL-CCNC: 68 U/L — SIGNIFICANT CHANGE UP (ref 40–120)
ALP SERPL-CCNC: 88 U/L — SIGNIFICANT CHANGE UP (ref 40–120)
ALP SERPL-CCNC: 88 U/L — SIGNIFICANT CHANGE UP (ref 40–120)
ALP SERPL-CCNC: 89 U/L — SIGNIFICANT CHANGE UP (ref 40–120)
ALP SERPL-CCNC: 89 U/L — SIGNIFICANT CHANGE UP (ref 40–120)
ALT FLD-CCNC: 24 U/L — SIGNIFICANT CHANGE UP (ref 4–41)
ALT FLD-CCNC: 25 U/L — SIGNIFICANT CHANGE UP (ref 4–41)
ALT FLD-CCNC: 25 U/L — SIGNIFICANT CHANGE UP (ref 4–41)
ALT FLD-CCNC: 30 U/L — SIGNIFICANT CHANGE UP (ref 4–41)
ALT FLD-CCNC: 30 U/L — SIGNIFICANT CHANGE UP (ref 4–41)
AMPHIPHYSIN AB CSF QL IF: NEGATIVE
ANION GAP SERPL CALC-SCNC: 10 MMOL/L — SIGNIFICANT CHANGE UP (ref 7–14)
ANION GAP SERPL CALC-SCNC: 11 MMOL/L — SIGNIFICANT CHANGE UP (ref 7–14)
ANION GAP SERPL CALC-SCNC: 11 MMOL/L — SIGNIFICANT CHANGE UP (ref 7–14)
ANION GAP SERPL CALC-SCNC: 12 MMOL/L — SIGNIFICANT CHANGE UP (ref 7–14)
ANION GAP SERPL CALC-SCNC: 13 MMOL/L — SIGNIFICANT CHANGE UP (ref 7–14)
ANION GAP SERPL CALC-SCNC: 13 MMOL/L — SIGNIFICANT CHANGE UP (ref 7–14)
ANION GAP SERPL CALC-SCNC: 9 MMOL/L — SIGNIFICANT CHANGE UP (ref 7–14)
APPEARANCE CSF: CLEAR
APTT BLD: 27.1 SEC — SIGNIFICANT CHANGE UP (ref 24.5–35.6)
APTT BLD: 27.1 SEC — SIGNIFICANT CHANGE UP (ref 24.5–35.6)
AST SERPL-CCNC: 17 U/L — SIGNIFICANT CHANGE UP (ref 4–40)
AST SERPL-CCNC: 21 U/L — SIGNIFICANT CHANGE UP (ref 4–40)
AST SERPL-CCNC: 21 U/L — SIGNIFICANT CHANGE UP (ref 4–40)
AST SERPL-CCNC: 24 U/L — SIGNIFICANT CHANGE UP (ref 4–40)
AST SERPL-CCNC: 24 U/L — SIGNIFICANT CHANGE UP (ref 4–40)
B PERT DNA SPEC QL NAA+PROBE: SIGNIFICANT CHANGE UP
B PERT DNA SPEC QL NAA+PROBE: SIGNIFICANT CHANGE UP
B PERT+PARAPERT DNA PNL SPEC NAA+PROBE: SIGNIFICANT CHANGE UP
B PERT+PARAPERT DNA PNL SPEC NAA+PROBE: SIGNIFICANT CHANGE UP
BASOPHILS # BLD AUTO: 0 K/UL — SIGNIFICANT CHANGE UP (ref 0–0.2)
BASOPHILS # BLD AUTO: 0.01 K/UL — SIGNIFICANT CHANGE UP (ref 0–0.2)
BASOPHILS # BLD AUTO: 0.01 K/UL — SIGNIFICANT CHANGE UP (ref 0–0.2)
BASOPHILS # BLD AUTO: 0.02 K/UL — SIGNIFICANT CHANGE UP (ref 0–0.2)
BASOPHILS # BLD AUTO: 0.02 K/UL — SIGNIFICANT CHANGE UP (ref 0–0.2)
BASOPHILS # BLD AUTO: 0.17 K/UL — SIGNIFICANT CHANGE UP (ref 0–0.2)
BASOPHILS NFR BLD AUTO: 0 % — SIGNIFICANT CHANGE UP (ref 0–2)
BASOPHILS NFR BLD AUTO: 0.1 % — SIGNIFICANT CHANGE UP (ref 0–2)
BASOPHILS NFR BLD AUTO: 0.1 % — SIGNIFICANT CHANGE UP (ref 0–2)
BASOPHILS NFR BLD AUTO: 0.2 % — SIGNIFICANT CHANGE UP (ref 0–2)
BASOPHILS NFR BLD AUTO: 0.2 % — SIGNIFICANT CHANGE UP (ref 0–2)
BASOPHILS NFR BLD AUTO: 1.1 % — SIGNIFICANT CHANGE UP (ref 0–2)
BILIRUB SERPL-MCNC: 0.2 MG/DL — SIGNIFICANT CHANGE UP (ref 0.2–1.2)
BILIRUB SERPL-MCNC: 0.2 MG/DL — SIGNIFICANT CHANGE UP (ref 0.2–1.2)
BILIRUB SERPL-MCNC: 0.3 MG/DL — SIGNIFICANT CHANGE UP (ref 0.2–1.2)
BILIRUB SERPL-MCNC: 0.3 MG/DL — SIGNIFICANT CHANGE UP (ref 0.2–1.2)
BILIRUB SERPL-MCNC: <0.2 MG/DL — SIGNIFICANT CHANGE UP (ref 0.2–1.2)
BORDETELLA PARAPERTUSSIS (RAPRVP): SIGNIFICANT CHANGE UP
BORDETELLA PARAPERTUSSIS (RAPRVP): SIGNIFICANT CHANGE UP
BUN SERPL-MCNC: 24 MG/DL — HIGH (ref 7–23)
BUN SERPL-MCNC: 24 MG/DL — HIGH (ref 7–23)
BUN SERPL-MCNC: 27 MG/DL — HIGH (ref 7–23)
BUN SERPL-MCNC: 27 MG/DL — HIGH (ref 7–23)
BUN SERPL-MCNC: 30 MG/DL — HIGH (ref 7–23)
BUN SERPL-MCNC: 30 MG/DL — HIGH (ref 7–23)
BUN SERPL-MCNC: 36 MG/DL — HIGH (ref 7–23)
BUN SERPL-MCNC: 42 MG/DL — HIGH (ref 7–23)
BUN SERPL-MCNC: 44 MG/DL — HIGH (ref 7–23)
BUN SERPL-MCNC: 45 MG/DL — HIGH (ref 7–23)
C PNEUM DNA SPEC QL NAA+PROBE: SIGNIFICANT CHANGE UP
C PNEUM DNA SPEC QL NAA+PROBE: SIGNIFICANT CHANGE UP
CALCIUM SERPL-MCNC: 7.5 MG/DL — LOW (ref 8.4–10.5)
CALCIUM SERPL-MCNC: 7.5 MG/DL — LOW (ref 8.4–10.5)
CALCIUM SERPL-MCNC: 7.8 MG/DL — LOW (ref 8.4–10.5)
CALCIUM SERPL-MCNC: 7.8 MG/DL — LOW (ref 8.4–10.5)
CALCIUM SERPL-MCNC: 7.9 MG/DL — LOW (ref 8.4–10.5)
CALCIUM SERPL-MCNC: 8 MG/DL — LOW (ref 8.4–10.5)
CELL FRACT CSF-IMP: NORMAL
CHLORIDE SERPL-SCNC: 103 MMOL/L — SIGNIFICANT CHANGE UP (ref 98–107)
CHLORIDE SERPL-SCNC: 104 MMOL/L — SIGNIFICANT CHANGE UP (ref 98–107)
CHLORIDE SERPL-SCNC: 105 MMOL/L — SIGNIFICANT CHANGE UP (ref 98–107)
CHLORIDE SERPL-SCNC: 105 MMOL/L — SIGNIFICANT CHANGE UP (ref 98–107)
CHLORIDE SERPL-SCNC: 106 MMOL/L — SIGNIFICANT CHANGE UP (ref 98–107)
CHLORIDE SERPL-SCNC: 106 MMOL/L — SIGNIFICANT CHANGE UP (ref 98–107)
CHOLEST SERPL-MCNC: 131 MG/DL — SIGNIFICANT CHANGE UP
CHOLEST SERPL-MCNC: 131 MG/DL — SIGNIFICANT CHANGE UP
CO2 SERPL-SCNC: 20 MMOL/L — LOW (ref 22–31)
CO2 SERPL-SCNC: 20 MMOL/L — LOW (ref 22–31)
CO2 SERPL-SCNC: 21 MMOL/L — LOW (ref 22–31)
CO2 SERPL-SCNC: 23 MMOL/L — SIGNIFICANT CHANGE UP (ref 22–31)
CO2 SERPL-SCNC: 23 MMOL/L — SIGNIFICANT CHANGE UP (ref 22–31)
CO2 SERPL-SCNC: 24 MMOL/L — SIGNIFICANT CHANGE UP (ref 22–31)
CO2 SERPL-SCNC: 24 MMOL/L — SIGNIFICANT CHANGE UP (ref 22–31)
COLOR CSF: NORMAL
CREAT SERPL-MCNC: 1.33 MG/DL — HIGH (ref 0.5–1.3)
CREAT SERPL-MCNC: 1.33 MG/DL — HIGH (ref 0.5–1.3)
CREAT SERPL-MCNC: 1.42 MG/DL — HIGH (ref 0.5–1.3)
CREAT SERPL-MCNC: 1.42 MG/DL — HIGH (ref 0.5–1.3)
CREAT SERPL-MCNC: 1.44 MG/DL — HIGH (ref 0.5–1.3)
CREAT SERPL-MCNC: 1.46 MG/DL — HIGH (ref 0.5–1.3)
CREAT SERPL-MCNC: 1.46 MG/DL — HIGH (ref 0.5–1.3)
CREAT SERPL-MCNC: 1.59 MG/DL — HIGH (ref 0.5–1.3)
CREAT SERPL-MCNC: 1.59 MG/DL — HIGH (ref 0.5–1.3)
CREAT SERPL-MCNC: 1.61 MG/DL — HIGH (ref 0.5–1.3)
CREAT SERPL-MCNC: 1.62 MG/DL — HIGH (ref 0.5–1.3)
CREAT SERPL-MCNC: 1.62 MG/DL — HIGH (ref 0.5–1.3)
CREAT SERPL-MCNC: 1.8 MG/DL — HIGH (ref 0.5–1.3)
CREAT SERPL-MCNC: 1.8 MG/DL — HIGH (ref 0.5–1.3)
CSFPCR RESULT: NOT DETECTED
CULTURE RESULTS: SIGNIFICANT CHANGE UP
CULTURE RESULTS: SIGNIFICANT CHANGE UP
CV2 AB CSF QL IF: NEGATIVE
EGFR: 38 ML/MIN/1.73M2 — LOW
EGFR: 38 ML/MIN/1.73M2 — LOW
EGFR: 43 ML/MIN/1.73M2 — LOW
EGFR: 44 ML/MIN/1.73M2 — LOW
EGFR: 44 ML/MIN/1.73M2 — LOW
EGFR: 48 ML/MIN/1.73M2 — LOW
EGFR: 48 ML/MIN/1.73M2 — LOW
EGFR: 49 ML/MIN/1.73M2 — LOW
EGFR: 50 ML/MIN/1.73M2 — LOW
EGFR: 50 ML/MIN/1.73M2 — LOW
EGFR: 54 ML/MIN/1.73M2 — LOW
EGFR: 54 ML/MIN/1.73M2 — LOW
EOSINOPHIL # BLD AUTO: 0 K/UL — SIGNIFICANT CHANGE UP (ref 0–0.5)
EOSINOPHIL # BLD AUTO: 0.02 K/UL — SIGNIFICANT CHANGE UP (ref 0–0.5)
EOSINOPHIL # BLD AUTO: 0.06 K/UL — SIGNIFICANT CHANGE UP (ref 0–0.5)
EOSINOPHIL # BLD AUTO: 0.06 K/UL — SIGNIFICANT CHANGE UP (ref 0–0.5)
EOSINOPHIL # BLD AUTO: 0.08 K/UL — SIGNIFICANT CHANGE UP (ref 0–0.5)
EOSINOPHIL # BLD AUTO: 0.08 K/UL — SIGNIFICANT CHANGE UP (ref 0–0.5)
EOSINOPHIL NFR BLD AUTO: 0 % — SIGNIFICANT CHANGE UP (ref 0–6)
EOSINOPHIL NFR BLD AUTO: 0.1 % — SIGNIFICANT CHANGE UP (ref 0–6)
EOSINOPHIL NFR BLD AUTO: 0.7 % — SIGNIFICANT CHANGE UP (ref 0–6)
EOSINOPHIL NFR BLD AUTO: 0.7 % — SIGNIFICANT CHANGE UP (ref 0–6)
EOSINOPHIL NFR BLD AUTO: 1 % — SIGNIFICANT CHANGE UP (ref 0–6)
EOSINOPHIL NFR BLD AUTO: 1 % — SIGNIFICANT CHANGE UP (ref 0–6)
ESTIMATED AVERAGE GLUCOSE: 128 — SIGNIFICANT CHANGE UP
FLUAV AG NPH QL: SIGNIFICANT CHANGE UP
FLUAV AG NPH QL: SIGNIFICANT CHANGE UP
FLUAV SUBTYP SPEC NAA+PROBE: SIGNIFICANT CHANGE UP
FLUAV SUBTYP SPEC NAA+PROBE: SIGNIFICANT CHANGE UP
FLUBV AG NPH QL: SIGNIFICANT CHANGE UP
FLUBV AG NPH QL: SIGNIFICANT CHANGE UP
FLUBV RNA SPEC QL NAA+PROBE: SIGNIFICANT CHANGE UP
FLUBV RNA SPEC QL NAA+PROBE: SIGNIFICANT CHANGE UP
GLIAL NUC TYPE 1 AB CSF QL IF: NEGATIVE
GLUCOSE CSF-MCNC: 66 MG/DL
GLUCOSE SERPL-MCNC: 105 MG/DL — HIGH (ref 70–99)
GLUCOSE SERPL-MCNC: 108 MG/DL — HIGH (ref 70–99)
GLUCOSE SERPL-MCNC: 108 MG/DL — HIGH (ref 70–99)
GLUCOSE SERPL-MCNC: 118 MG/DL — HIGH (ref 70–99)
GLUCOSE SERPL-MCNC: 118 MG/DL — HIGH (ref 70–99)
GLUCOSE SERPL-MCNC: 148 MG/DL — HIGH (ref 70–99)
GLUCOSE SERPL-MCNC: 92 MG/DL — SIGNIFICANT CHANGE UP (ref 70–99)
GLUCOSE SERPL-MCNC: 96 MG/DL — SIGNIFICANT CHANGE UP (ref 70–99)
GLUCOSE SERPL-MCNC: 98 MG/DL — SIGNIFICANT CHANGE UP (ref 70–99)
GLUCOSE SERPL-MCNC: 98 MG/DL — SIGNIFICANT CHANGE UP (ref 70–99)
GLUCOSE SERPL-MCNC: 99 MG/DL — SIGNIFICANT CHANGE UP (ref 70–99)
HADV DNA SPEC QL NAA+PROBE: SIGNIFICANT CHANGE UP
HADV DNA SPEC QL NAA+PROBE: SIGNIFICANT CHANGE UP
HCOV 229E RNA SPEC QL NAA+PROBE: SIGNIFICANT CHANGE UP
HCOV 229E RNA SPEC QL NAA+PROBE: SIGNIFICANT CHANGE UP
HCOV HKU1 RNA SPEC QL NAA+PROBE: DETECTED
HCOV HKU1 RNA SPEC QL NAA+PROBE: DETECTED
HCOV NL63 RNA SPEC QL NAA+PROBE: SIGNIFICANT CHANGE UP
HCOV NL63 RNA SPEC QL NAA+PROBE: SIGNIFICANT CHANGE UP
HCOV OC43 RNA SPEC QL NAA+PROBE: SIGNIFICANT CHANGE UP
HCOV OC43 RNA SPEC QL NAA+PROBE: SIGNIFICANT CHANGE UP
HCT VFR BLD CALC: 30.7 % — LOW (ref 39–50)
HCT VFR BLD CALC: 30.7 % — LOW (ref 39–50)
HCT VFR BLD CALC: 31.3 % — LOW (ref 39–50)
HCT VFR BLD CALC: 32.2 % — LOW (ref 39–50)
HCT VFR BLD CALC: 32.2 % — LOW (ref 39–50)
HCT VFR BLD CALC: 32.3 % — LOW (ref 39–50)
HCT VFR BLD CALC: 32.4 % — LOW (ref 39–50)
HCT VFR BLD CALC: 33 % — LOW (ref 39–50)
HCT VFR BLD CALC: 33 % — LOW (ref 39–50)
HDLC SERPL-MCNC: 37 MG/DL — LOW
HDLC SERPL-MCNC: 37 MG/DL — LOW
HGB BLD-MCNC: 10.1 G/DL — LOW (ref 13–17)
HGB BLD-MCNC: 10.1 G/DL — LOW (ref 13–17)
HGB BLD-MCNC: 10.2 G/DL — LOW (ref 13–17)
HGB BLD-MCNC: 10.3 G/DL — LOW (ref 13–17)
HGB BLD-MCNC: 10.3 G/DL — LOW (ref 13–17)
HGB BLD-MCNC: 10.4 G/DL — LOW (ref 13–17)
HGB BLD-MCNC: 10.5 G/DL — LOW (ref 13–17)
HGB BLD-MCNC: 10.5 G/DL — LOW (ref 13–17)
HGB BLD-MCNC: 10.6 G/DL — LOW (ref 13–17)
HGB BLD-MCNC: 10.7 G/DL — LOW (ref 13–17)
HMPV RNA SPEC QL NAA+PROBE: SIGNIFICANT CHANGE UP
HMPV RNA SPEC QL NAA+PROBE: SIGNIFICANT CHANGE UP
HPIV1 RNA SPEC QL NAA+PROBE: SIGNIFICANT CHANGE UP
HPIV1 RNA SPEC QL NAA+PROBE: SIGNIFICANT CHANGE UP
HPIV2 RNA SPEC QL NAA+PROBE: SIGNIFICANT CHANGE UP
HPIV2 RNA SPEC QL NAA+PROBE: SIGNIFICANT CHANGE UP
HPIV3 RNA SPEC QL NAA+PROBE: SIGNIFICANT CHANGE UP
HPIV3 RNA SPEC QL NAA+PROBE: SIGNIFICANT CHANGE UP
HPIV4 RNA SPEC QL NAA+PROBE: SIGNIFICANT CHANGE UP
HPIV4 RNA SPEC QL NAA+PROBE: SIGNIFICANT CHANGE UP
HU1 AB CSF QL IF: NEGATIVE
HU2 AB CSF QL IF: NEGATIVE
HU3 AB CSF QL IF: NEGATIVE
IANC: 11.41 K/UL — HIGH (ref 1.8–7.4)
IANC: 6.72 K/UL — SIGNIFICANT CHANGE UP (ref 1.8–7.4)
IANC: 6.72 K/UL — SIGNIFICANT CHANGE UP (ref 1.8–7.4)
IANC: 6.93 K/UL — SIGNIFICANT CHANGE UP (ref 1.8–7.4)
IANC: 6.93 K/UL — SIGNIFICANT CHANGE UP (ref 1.8–7.4)
IANC: 9.55 K/UL — HIGH (ref 1.8–7.4)
IMM GRANULOCYTES NFR BLD AUTO: 12.5 % — HIGH (ref 0–0.9)
IMM GRANULOCYTES NFR BLD AUTO: 2.1 % — HIGH (ref 0–0.9)
IMM GRANULOCYTES NFR BLD AUTO: 2.1 % — HIGH (ref 0–0.9)
IMM GRANULOCYTES NFR BLD AUTO: 2.6 % — HIGH (ref 0–0.9)
IMM GRANULOCYTES NFR BLD AUTO: 2.6 % — HIGH (ref 0–0.9)
INR BLD: 1.11 RATIO — SIGNIFICANT CHANGE UP (ref 0.85–1.18)
INR BLD: 1.11 RATIO — SIGNIFICANT CHANGE UP (ref 0.85–1.18)
LEGIONELLA AG UR QL: NEGATIVE — SIGNIFICANT CHANGE UP
LEGIONELLA AG UR QL: NEGATIVE — SIGNIFICANT CHANGE UP
LIPID PNL WITH DIRECT LDL SERPL: 75 MG/DL — SIGNIFICANT CHANGE UP
LIPID PNL WITH DIRECT LDL SERPL: 75 MG/DL — SIGNIFICANT CHANGE UP
LYMPHOCYTES # BLD AUTO: 0.69 K/UL — LOW (ref 1–3.3)
LYMPHOCYTES # BLD AUTO: 0.69 K/UL — LOW (ref 1–3.3)
LYMPHOCYTES # BLD AUTO: 0.81 K/UL — LOW (ref 1–3.3)
LYMPHOCYTES # BLD AUTO: 0.81 K/UL — LOW (ref 1–3.3)
LYMPHOCYTES # BLD AUTO: 1.37 K/UL — SIGNIFICANT CHANGE UP (ref 1–3.3)
LYMPHOCYTES # BLD AUTO: 1.55 K/UL — SIGNIFICANT CHANGE UP (ref 1–3.3)
LYMPHOCYTES # BLD AUTO: 11.4 % — LOW (ref 13–44)
LYMPHOCYTES # BLD AUTO: 8.2 % — LOW (ref 13–44)
LYMPHOCYTES # BLD AUTO: 8.2 % — LOW (ref 13–44)
LYMPHOCYTES # BLD AUTO: 8.6 % — LOW (ref 13–44)
LYMPHOCYTES # BLD AUTO: 9.7 % — LOW (ref 13–44)
LYMPHOCYTES # BLD AUTO: 9.7 % — LOW (ref 13–44)
LYMPHOCYTES NFR CSF MANUAL: 16 %
M PNEUMO DNA SPEC QL NAA+PROBE: SIGNIFICANT CHANGE UP
M PNEUMO DNA SPEC QL NAA+PROBE: SIGNIFICANT CHANGE UP
MAGNESIUM SERPL-MCNC: 2.1 MG/DL — SIGNIFICANT CHANGE UP (ref 1.6–2.6)
MAGNESIUM SERPL-MCNC: 2.2 MG/DL — SIGNIFICANT CHANGE UP (ref 1.6–2.6)
MAGNESIUM SERPL-MCNC: 2.3 MG/DL — SIGNIFICANT CHANGE UP (ref 1.6–2.6)
MCHC RBC-ENTMCNC: 27.9 PG — SIGNIFICANT CHANGE UP (ref 27–34)
MCHC RBC-ENTMCNC: 27.9 PG — SIGNIFICANT CHANGE UP (ref 27–34)
MCHC RBC-ENTMCNC: 28 PG — SIGNIFICANT CHANGE UP (ref 27–34)
MCHC RBC-ENTMCNC: 28.1 PG — SIGNIFICANT CHANGE UP (ref 27–34)
MCHC RBC-ENTMCNC: 28.3 PG — SIGNIFICANT CHANGE UP (ref 27–34)
MCHC RBC-ENTMCNC: 28.5 PG — SIGNIFICANT CHANGE UP (ref 27–34)
MCHC RBC-ENTMCNC: 28.7 PG — SIGNIFICANT CHANGE UP (ref 27–34)
MCHC RBC-ENTMCNC: 28.7 PG — SIGNIFICANT CHANGE UP (ref 27–34)
MCHC RBC-ENTMCNC: 28.8 PG — SIGNIFICANT CHANGE UP (ref 27–34)
MCHC RBC-ENTMCNC: 28.8 PG — SIGNIFICANT CHANGE UP (ref 27–34)
MCHC RBC-ENTMCNC: 31.6 GM/DL — LOW (ref 32–36)
MCHC RBC-ENTMCNC: 31.6 GM/DL — LOW (ref 32–36)
MCHC RBC-ENTMCNC: 31.8 GM/DL — LOW (ref 32–36)
MCHC RBC-ENTMCNC: 31.8 GM/DL — LOW (ref 32–36)
MCHC RBC-ENTMCNC: 32.3 GM/DL — SIGNIFICANT CHANGE UP (ref 32–36)
MCHC RBC-ENTMCNC: 32.6 GM/DL — SIGNIFICANT CHANGE UP (ref 32–36)
MCHC RBC-ENTMCNC: 32.9 GM/DL — SIGNIFICANT CHANGE UP (ref 32–36)
MCHC RBC-ENTMCNC: 33 GM/DL — SIGNIFICANT CHANGE UP (ref 32–36)
MCHC RBC-ENTMCNC: 33.1 GM/DL — SIGNIFICANT CHANGE UP (ref 32–36)
MCHC RBC-ENTMCNC: 33.1 GM/DL — SIGNIFICANT CHANGE UP (ref 32–36)
MCHC RBC-ENTMCNC: 33.6 GM/DL — SIGNIFICANT CHANGE UP (ref 32–36)
MCHC RBC-ENTMCNC: 33.6 GM/DL — SIGNIFICANT CHANGE UP (ref 32–36)
MCV RBC AUTO: 85.4 FL — SIGNIFICANT CHANGE UP (ref 80–100)
MCV RBC AUTO: 85.5 FL — SIGNIFICANT CHANGE UP (ref 80–100)
MCV RBC AUTO: 85.5 FL — SIGNIFICANT CHANGE UP (ref 80–100)
MCV RBC AUTO: 86.4 FL — SIGNIFICANT CHANGE UP (ref 80–100)
MCV RBC AUTO: 86.5 FL — SIGNIFICANT CHANGE UP (ref 80–100)
MCV RBC AUTO: 86.5 FL — SIGNIFICANT CHANGE UP (ref 80–100)
MCV RBC AUTO: 86.6 FL — SIGNIFICANT CHANGE UP (ref 80–100)
MCV RBC AUTO: 86.7 FL — SIGNIFICANT CHANGE UP (ref 80–100)
MCV RBC AUTO: 86.8 FL — SIGNIFICANT CHANGE UP (ref 80–100)
MCV RBC AUTO: 86.8 FL — SIGNIFICANT CHANGE UP (ref 80–100)
MCV RBC AUTO: 89 FL — SIGNIFICANT CHANGE UP (ref 80–100)
MCV RBC AUTO: 89 FL — SIGNIFICANT CHANGE UP (ref 80–100)
MCV RBC AUTO: 89.7 FL — SIGNIFICANT CHANGE UP (ref 80–100)
MCV RBC AUTO: 89.7 FL — SIGNIFICANT CHANGE UP (ref 80–100)
MONOCYTES # BLD AUTO: 0.35 K/UL — SIGNIFICANT CHANGE UP (ref 0–0.9)
MONOCYTES # BLD AUTO: 0.49 K/UL — SIGNIFICANT CHANGE UP (ref 0–0.9)
MONOCYTES # BLD AUTO: 0.49 K/UL — SIGNIFICANT CHANGE UP (ref 0–0.9)
MONOCYTES # BLD AUTO: 0.51 K/UL — SIGNIFICANT CHANGE UP (ref 0–0.9)
MONOCYTES # BLD AUTO: 0.51 K/UL — SIGNIFICANT CHANGE UP (ref 0–0.9)
MONOCYTES # BLD AUTO: 1.02 K/UL — HIGH (ref 0–0.9)
MONOCYTES NFR BLD AUTO: 2.6 % — SIGNIFICANT CHANGE UP (ref 2–14)
MONOCYTES NFR BLD AUTO: 5.9 % — SIGNIFICANT CHANGE UP (ref 2–14)
MONOCYTES NFR BLD AUTO: 5.9 % — SIGNIFICANT CHANGE UP (ref 2–14)
MONOCYTES NFR BLD AUTO: 6.1 % — SIGNIFICANT CHANGE UP (ref 2–14)
MONOCYTES NFR BLD AUTO: 6.1 % — SIGNIFICANT CHANGE UP (ref 2–14)
MONOCYTES NFR BLD AUTO: 6.4 % — SIGNIFICANT CHANGE UP (ref 2–14)
MONOS+MACROS NFR CSF MANUAL: 9 %
MRSA PCR RESULT.: SIGNIFICANT CHANGE UP
MRSA PCR RESULT.: SIGNIFICANT CHANGE UP
NEUTROPHILS # BLD AUTO: 11.08 K/UL — HIGH (ref 1.8–7.4)
NEUTROPHILS # BLD AUTO: 11.41 K/UL — HIGH (ref 1.8–7.4)
NEUTROPHILS # BLD AUTO: 6.72 K/UL — SIGNIFICANT CHANGE UP (ref 1.8–7.4)
NEUTROPHILS # BLD AUTO: 6.72 K/UL — SIGNIFICANT CHANGE UP (ref 1.8–7.4)
NEUTROPHILS # BLD AUTO: 6.93 K/UL — SIGNIFICANT CHANGE UP (ref 1.8–7.4)
NEUTROPHILS # BLD AUTO: 6.93 K/UL — SIGNIFICANT CHANGE UP (ref 1.8–7.4)
NEUTROPHILS NFR BLD AUTO: 71.3 % — SIGNIFICANT CHANGE UP (ref 43–77)
NEUTROPHILS NFR BLD AUTO: 80.6 % — HIGH (ref 43–77)
NEUTROPHILS NFR BLD AUTO: 80.6 % — HIGH (ref 43–77)
NEUTROPHILS NFR BLD AUTO: 81.6 % — HIGH (ref 43–77)
NEUTROPHILS NFR BLD AUTO: 82.8 % — HIGH (ref 43–77)
NEUTROPHILS NFR BLD AUTO: 82.8 % — HIGH (ref 43–77)
NEUTROPHILS NFR CSF MANUAL: 0 %
NON HDL CHOLESTEROL: 94 MG/DL — SIGNIFICANT CHANGE UP
NON HDL CHOLESTEROL: 94 MG/DL — SIGNIFICANT CHANGE UP
NON-GYNECOLOGICAL CYTOLOGY STUDY: SIGNIFICANT CHANGE UP
NON-GYNECOLOGICAL CYTOLOGY STUDY: SIGNIFICANT CHANGE UP
NRBC # BLD: 0 /100 WBCS — SIGNIFICANT CHANGE UP (ref 0–0)
NRBC # FLD: 0 K/UL — SIGNIFICANT CHANGE UP (ref 0–0)
NRBC # FLD: 0.02 K/UL — HIGH (ref 0–0)
OLIGOCLONAL BANDS CSF ELPH-IMP: PRESENT
PARANEOPLASTIC AB SER-IMP: NORMAL
PCA-TR AB CSF QL IF: NEGATIVE
PHOSPHATE SERPL-MCNC: 2.8 MG/DL — SIGNIFICANT CHANGE UP (ref 2.5–4.5)
PHOSPHATE SERPL-MCNC: 3 MG/DL — SIGNIFICANT CHANGE UP (ref 2.5–4.5)
PHOSPHATE SERPL-MCNC: 3 MG/DL — SIGNIFICANT CHANGE UP (ref 2.5–4.5)
PHOSPHATE SERPL-MCNC: 3.1 MG/DL — SIGNIFICANT CHANGE UP (ref 2.5–4.5)
PHOSPHATE SERPL-MCNC: 3.4 MG/DL — SIGNIFICANT CHANGE UP (ref 2.5–4.5)
PHOSPHATE SERPL-MCNC: 3.4 MG/DL — SIGNIFICANT CHANGE UP (ref 2.5–4.5)
PHOSPHATE SERPL-MCNC: 3.5 MG/DL — SIGNIFICANT CHANGE UP (ref 2.5–4.5)
PLATELET # BLD AUTO: 228 K/UL — SIGNIFICANT CHANGE UP (ref 150–400)
PLATELET # BLD AUTO: 228 K/UL — SIGNIFICANT CHANGE UP (ref 150–400)
PLATELET # BLD AUTO: 234 K/UL — SIGNIFICANT CHANGE UP (ref 150–400)
PLATELET # BLD AUTO: 234 K/UL — SIGNIFICANT CHANGE UP (ref 150–400)
PLATELET # BLD AUTO: 283 K/UL — SIGNIFICANT CHANGE UP (ref 150–400)
PLATELET # BLD AUTO: 283 K/UL — SIGNIFICANT CHANGE UP (ref 150–400)
PLATELET # BLD AUTO: 300 K/UL — SIGNIFICANT CHANGE UP (ref 150–400)
PLATELET # BLD AUTO: 300 K/UL — SIGNIFICANT CHANGE UP (ref 150–400)
PLATELET # BLD AUTO: 304 K/UL — SIGNIFICANT CHANGE UP (ref 150–400)
PLATELET # BLD AUTO: 304 K/UL — SIGNIFICANT CHANGE UP (ref 150–400)
PLATELET # BLD AUTO: 334 K/UL — SIGNIFICANT CHANGE UP (ref 150–400)
PLATELET # BLD AUTO: 345 K/UL — SIGNIFICANT CHANGE UP (ref 150–400)
PLATELET # BLD AUTO: 352 K/UL — SIGNIFICANT CHANGE UP (ref 150–400)
PLATELET # BLD AUTO: 366 K/UL — SIGNIFICANT CHANGE UP (ref 150–400)
POTASSIUM SERPL-MCNC: 4 MMOL/L — SIGNIFICANT CHANGE UP (ref 3.5–5.3)
POTASSIUM SERPL-MCNC: 4 MMOL/L — SIGNIFICANT CHANGE UP (ref 3.5–5.3)
POTASSIUM SERPL-MCNC: 4.2 MMOL/L — SIGNIFICANT CHANGE UP (ref 3.5–5.3)
POTASSIUM SERPL-MCNC: 4.2 MMOL/L — SIGNIFICANT CHANGE UP (ref 3.5–5.3)
POTASSIUM SERPL-MCNC: 4.3 MMOL/L — SIGNIFICANT CHANGE UP (ref 3.5–5.3)
POTASSIUM SERPL-MCNC: 4.3 MMOL/L — SIGNIFICANT CHANGE UP (ref 3.5–5.3)
POTASSIUM SERPL-MCNC: 4.4 MMOL/L — SIGNIFICANT CHANGE UP (ref 3.5–5.3)
POTASSIUM SERPL-MCNC: 4.6 MMOL/L — SIGNIFICANT CHANGE UP (ref 3.5–5.3)
POTASSIUM SERPL-MCNC: 4.7 MMOL/L — SIGNIFICANT CHANGE UP (ref 3.5–5.3)
POTASSIUM SERPL-MCNC: 4.7 MMOL/L — SIGNIFICANT CHANGE UP (ref 3.5–5.3)
POTASSIUM SERPL-MCNC: 4.9 MMOL/L — SIGNIFICANT CHANGE UP (ref 3.5–5.3)
POTASSIUM SERPL-MCNC: 5.1 MMOL/L — SIGNIFICANT CHANGE UP (ref 3.5–5.3)
POTASSIUM SERPL-SCNC: 4 MMOL/L — SIGNIFICANT CHANGE UP (ref 3.5–5.3)
POTASSIUM SERPL-SCNC: 4 MMOL/L — SIGNIFICANT CHANGE UP (ref 3.5–5.3)
POTASSIUM SERPL-SCNC: 4.2 MMOL/L — SIGNIFICANT CHANGE UP (ref 3.5–5.3)
POTASSIUM SERPL-SCNC: 4.2 MMOL/L — SIGNIFICANT CHANGE UP (ref 3.5–5.3)
POTASSIUM SERPL-SCNC: 4.3 MMOL/L — SIGNIFICANT CHANGE UP (ref 3.5–5.3)
POTASSIUM SERPL-SCNC: 4.3 MMOL/L — SIGNIFICANT CHANGE UP (ref 3.5–5.3)
POTASSIUM SERPL-SCNC: 4.4 MMOL/L — SIGNIFICANT CHANGE UP (ref 3.5–5.3)
POTASSIUM SERPL-SCNC: 4.6 MMOL/L — SIGNIFICANT CHANGE UP (ref 3.5–5.3)
POTASSIUM SERPL-SCNC: 4.7 MMOL/L — SIGNIFICANT CHANGE UP (ref 3.5–5.3)
POTASSIUM SERPL-SCNC: 4.7 MMOL/L — SIGNIFICANT CHANGE UP (ref 3.5–5.3)
POTASSIUM SERPL-SCNC: 4.9 MMOL/L — SIGNIFICANT CHANGE UP (ref 3.5–5.3)
POTASSIUM SERPL-SCNC: 5.1 MMOL/L — SIGNIFICANT CHANGE UP (ref 3.5–5.3)
PROT CSF-MCNC: 79 MG/DL
PROT SERPL-MCNC: 5.2 G/DL — LOW (ref 6–8.3)
PROT SERPL-MCNC: 5.8 G/DL — LOW (ref 6–8.3)
PROT SERPL-MCNC: 5.8 G/DL — LOW (ref 6–8.3)
PROT SERPL-MCNC: 6.2 G/DL — SIGNIFICANT CHANGE UP (ref 6–8.3)
PROT SERPL-MCNC: 6.2 G/DL — SIGNIFICANT CHANGE UP (ref 6–8.3)
PROTHROM AB SERPL-ACNC: 12.5 SEC — SIGNIFICANT CHANGE UP (ref 9.5–13)
PROTHROM AB SERPL-ACNC: 12.5 SEC — SIGNIFICANT CHANGE UP (ref 9.5–13)
RAPID RVP RESULT: DETECTED
RAPID RVP RESULT: DETECTED
RBC # BLD: 3.59 M/UL — LOW (ref 4.2–5.8)
RBC # BLD: 3.59 M/UL — LOW (ref 4.2–5.8)
RBC # BLD: 3.61 M/UL — LOW (ref 4.2–5.8)
RBC # BLD: 3.62 M/UL — LOW (ref 4.2–5.8)
RBC # BLD: 3.62 M/UL — LOW (ref 4.2–5.8)
RBC # BLD: 3.63 M/UL — LOW (ref 4.2–5.8)
RBC # BLD: 3.63 M/UL — LOW (ref 4.2–5.8)
RBC # BLD: 3.68 M/UL — LOW (ref 4.2–5.8)
RBC # BLD: 3.68 M/UL — LOW (ref 4.2–5.8)
RBC # BLD: 3.72 M/UL — LOW (ref 4.2–5.8)
RBC # BLD: 3.75 M/UL — LOW (ref 4.2–5.8)
RBC # BLD: 3.77 M/UL — LOW (ref 4.2–5.8)
RBC # CSF MANUAL: 0 /UL
RBC # FLD: 15.9 % — HIGH (ref 10.3–14.5)
RBC # FLD: 16 % — HIGH (ref 10.3–14.5)
RBC # FLD: 16.5 % — HIGH (ref 10.3–14.5)
RBC # FLD: 16.6 % — HIGH (ref 10.3–14.5)
RBC # FLD: 16.6 % — HIGH (ref 10.3–14.5)
RBC # FLD: 16.7 % — HIGH (ref 10.3–14.5)
RBC # FLD: 16.8 % — HIGH (ref 10.3–14.5)
RBC # FLD: 16.8 % — HIGH (ref 10.3–14.5)
RSV RNA NPH QL NAA+NON-PROBE: SIGNIFICANT CHANGE UP
RSV RNA NPH QL NAA+NON-PROBE: SIGNIFICANT CHANGE UP
RSV RNA SPEC QL NAA+PROBE: SIGNIFICANT CHANGE UP
RSV RNA SPEC QL NAA+PROBE: SIGNIFICANT CHANGE UP
RV+EV RNA SPEC QL NAA+PROBE: SIGNIFICANT CHANGE UP
RV+EV RNA SPEC QL NAA+PROBE: SIGNIFICANT CHANGE UP
S AUREUS DNA NOSE QL NAA+PROBE: DETECTED
S AUREUS DNA NOSE QL NAA+PROBE: DETECTED
SARS-COV-2 RNA SPEC QL NAA+PROBE: SIGNIFICANT CHANGE UP
SODIUM SERPL-SCNC: 135 MMOL/L — SIGNIFICANT CHANGE UP (ref 135–145)
SODIUM SERPL-SCNC: 136 MMOL/L — SIGNIFICANT CHANGE UP (ref 135–145)
SODIUM SERPL-SCNC: 137 MMOL/L — SIGNIFICANT CHANGE UP (ref 135–145)
SODIUM SERPL-SCNC: 138 MMOL/L — SIGNIFICANT CHANGE UP (ref 135–145)
SODIUM SERPL-SCNC: 139 MMOL/L — SIGNIFICANT CHANGE UP (ref 135–145)
SODIUM SERPL-SCNC: 139 MMOL/L — SIGNIFICANT CHANGE UP (ref 135–145)
SODIUM SERPL-SCNC: 140 MMOL/L — SIGNIFICANT CHANGE UP (ref 135–145)
SODIUM SERPL-SCNC: 140 MMOL/L — SIGNIFICANT CHANGE UP (ref 135–145)
SPECIMEN SOURCE: SIGNIFICANT CHANGE UP
SPECIMEN SOURCE: SIGNIFICANT CHANGE UP
TOTAL CELLS COUNTED CSF: 13 /UL
TRIGL SERPL-MCNC: 94 MG/DL — SIGNIFICANT CHANGE UP
TRIGL SERPL-MCNC: 94 MG/DL — SIGNIFICANT CHANGE UP
TUBE TYPE: NORMAL
VARICELLA ZOSTER VIRUS (VZV), DNA PCR: NEGATIVE
VZV PCR: NOT DETECTED COPIES/ML
WBC # BLD: 10.89 K/UL — HIGH (ref 3.8–10.5)
WBC # BLD: 10.89 K/UL — HIGH (ref 3.8–10.5)
WBC # BLD: 11.67 K/UL — HIGH (ref 3.8–10.5)
WBC # BLD: 11.67 K/UL — HIGH (ref 3.8–10.5)
WBC # BLD: 12.74 K/UL — HIGH (ref 3.8–10.5)
WBC # BLD: 12.88 K/UL — HIGH (ref 3.8–10.5)
WBC # BLD: 12.92 K/UL — HIGH (ref 3.8–10.5)
WBC # BLD: 12.92 K/UL — HIGH (ref 3.8–10.5)
WBC # BLD: 13.58 K/UL — HIGH (ref 3.8–10.5)
WBC # BLD: 15.98 K/UL — HIGH (ref 3.8–10.5)
WBC # BLD: 8.34 K/UL — SIGNIFICANT CHANGE UP (ref 3.8–10.5)
WBC # BLD: 8.34 K/UL — SIGNIFICANT CHANGE UP (ref 3.8–10.5)
WBC # BLD: 8.38 K/UL — SIGNIFICANT CHANGE UP (ref 3.8–10.5)
WBC # BLD: 8.38 K/UL — SIGNIFICANT CHANGE UP (ref 3.8–10.5)
WBC # FLD AUTO: 10.89 K/UL — HIGH (ref 3.8–10.5)
WBC # FLD AUTO: 10.89 K/UL — HIGH (ref 3.8–10.5)
WBC # FLD AUTO: 11.67 K/UL — HIGH (ref 3.8–10.5)
WBC # FLD AUTO: 11.67 K/UL — HIGH (ref 3.8–10.5)
WBC # FLD AUTO: 12.74 K/UL — HIGH (ref 3.8–10.5)
WBC # FLD AUTO: 12.88 K/UL — HIGH (ref 3.8–10.5)
WBC # FLD AUTO: 12.92 K/UL — HIGH (ref 3.8–10.5)
WBC # FLD AUTO: 12.92 K/UL — HIGH (ref 3.8–10.5)
WBC # FLD AUTO: 13.58 K/UL — HIGH (ref 3.8–10.5)
WBC # FLD AUTO: 15.98 K/UL — HIGH (ref 3.8–10.5)
WBC # FLD AUTO: 8.34 K/UL — SIGNIFICANT CHANGE UP (ref 3.8–10.5)
WBC # FLD AUTO: 8.34 K/UL — SIGNIFICANT CHANGE UP (ref 3.8–10.5)
WBC # FLD AUTO: 8.38 K/UL — SIGNIFICANT CHANGE UP (ref 3.8–10.5)
WBC # FLD AUTO: 8.38 K/UL — SIGNIFICANT CHANGE UP (ref 3.8–10.5)

## 2024-01-01 PROCEDURE — 77427 RADIATION TX MANAGEMENT X5: CPT

## 2024-01-01 PROCEDURE — 77263 THER RADIOLOGY TX PLNG CPLX: CPT

## 2024-01-01 PROCEDURE — 99233 SBSQ HOSP IP/OBS HIGH 50: CPT

## 2024-01-01 PROCEDURE — 77295 3-D RADIOTHERAPY PLAN: CPT | Mod: 26

## 2024-01-01 PROCEDURE — 70450 CT HEAD/BRAIN W/O DYE: CPT | Mod: 26

## 2024-01-01 PROCEDURE — 72157 MRI CHEST SPINE W/O & W/DYE: CPT | Mod: 26

## 2024-01-01 PROCEDURE — 77334 RADIATION TREATMENT AID(S): CPT | Mod: 26

## 2024-01-01 PROCEDURE — 62328 DX LMBR SPI PNXR W/FLUOR/CT: CPT

## 2024-01-01 PROCEDURE — 71250 CT THORAX DX C-: CPT | Mod: 26

## 2024-01-01 PROCEDURE — 99285 EMERGENCY DEPT VISIT HI MDM: CPT

## 2024-01-01 PROCEDURE — 71045 X-RAY EXAM CHEST 1 VIEW: CPT | Mod: 26

## 2024-01-01 PROCEDURE — 88108 CYTOPATH CONCENTRATE TECH: CPT

## 2024-01-01 PROCEDURE — 76770 US EXAM ABDO BACK WALL COMP: CPT | Mod: 26

## 2024-01-01 PROCEDURE — 74176 CT ABD & PELVIS W/O CONTRAST: CPT | Mod: 26

## 2024-01-01 PROCEDURE — 99223 1ST HOSP IP/OBS HIGH 75: CPT

## 2024-01-01 PROCEDURE — 88108 CYTOPATH CONCENTRATE TECH: CPT | Mod: 26

## 2024-01-01 PROCEDURE — 77300 RADIATION THERAPY DOSE PLAN: CPT | Mod: 26

## 2024-01-01 PROCEDURE — 77280 THER RAD SIMULAJ FIELD SMPL: CPT | Mod: 26

## 2024-01-01 PROCEDURE — 72158 MRI LUMBAR SPINE W/O & W/DYE: CPT | Mod: 26

## 2024-01-01 RX ORDER — ATORVASTATIN CALCIUM 80 MG/1
20 TABLET, FILM COATED ORAL AT BEDTIME
Refills: 0 | Status: DISCONTINUED | OUTPATIENT
Start: 2024-01-01 | End: 2024-01-01

## 2024-01-01 RX ORDER — PETROLATUM,WHITE
1 JELLY (GRAM) TOPICAL THREE TIMES A DAY
Refills: 0 | Status: DISCONTINUED | OUTPATIENT
Start: 2024-01-01 | End: 2024-01-01

## 2024-01-01 RX ORDER — MELOXICAM 15 MG/1
1 TABLET ORAL
Refills: 0 | DISCHARGE

## 2024-01-01 RX ORDER — ONDANSETRON 8 MG/1
4 TABLET, FILM COATED ORAL EVERY 8 HOURS
Refills: 0 | Status: DISCONTINUED | OUTPATIENT
Start: 2024-01-01 | End: 2024-01-01

## 2024-01-01 RX ORDER — FINASTERIDE 5 MG/1
5 TABLET, FILM COATED ORAL DAILY
Refills: 0 | Status: DISCONTINUED | OUTPATIENT
Start: 2024-01-01 | End: 2024-01-01

## 2024-01-01 RX ORDER — MUPIROCIN 20 MG/G
1 OINTMENT TOPICAL THREE TIMES A DAY
Refills: 0 | Status: COMPLETED | OUTPATIENT
Start: 2024-01-01 | End: 2024-01-01

## 2024-01-01 RX ORDER — LISINOPRIL 2.5 MG/1
10 TABLET ORAL DAILY
Refills: 0 | Status: DISCONTINUED | OUTPATIENT
Start: 2024-01-01 | End: 2024-01-01

## 2024-01-01 RX ORDER — AMLODIPINE BESYLATE 2.5 MG/1
1 TABLET ORAL
Qty: 0 | Refills: 0 | DISCHARGE

## 2024-01-01 RX ORDER — SODIUM CHLORIDE 9 MG/ML
1000 INJECTION INTRAMUSCULAR; INTRAVENOUS; SUBCUTANEOUS ONCE
Refills: 0 | Status: COMPLETED | OUTPATIENT
Start: 2024-01-01 | End: 2024-01-01

## 2024-01-01 RX ORDER — ALLOPURINOL 300 MG
300 TABLET ORAL DAILY
Refills: 0 | Status: DISCONTINUED | OUTPATIENT
Start: 2024-01-01 | End: 2024-01-01

## 2024-01-01 RX ORDER — AZITHROMYCIN 500 MG/1
500 TABLET, FILM COATED ORAL EVERY 24 HOURS
Refills: 0 | Status: DISCONTINUED | OUTPATIENT
Start: 2024-01-01 | End: 2024-01-01

## 2024-01-01 RX ORDER — FINASTERIDE 5 MG/1
1 TABLET, FILM COATED ORAL
Refills: 0 | DISCHARGE

## 2024-01-01 RX ORDER — ALBUTEROL 90 UG/1
1 AEROSOL, METERED ORAL
Qty: 0 | Refills: 0 | DISCHARGE

## 2024-01-01 RX ORDER — CLOPIDOGREL BISULFATE 75 MG/1
75 TABLET, FILM COATED ORAL DAILY
Refills: 0 | Status: DISCONTINUED | OUTPATIENT
Start: 2024-01-01 | End: 2024-01-01

## 2024-01-01 RX ORDER — FUROSEMIDE 40 MG
1 TABLET ORAL
Refills: 0 | DISCHARGE

## 2024-01-01 RX ORDER — ALLOPURINOL 300 MG
1 TABLET ORAL
Qty: 0 | Refills: 0 | DISCHARGE

## 2024-01-01 RX ORDER — ACETAMINOPHEN 500 MG
650 TABLET ORAL EVERY 6 HOURS
Refills: 0 | Status: DISCONTINUED | OUTPATIENT
Start: 2024-01-01 | End: 2024-01-01

## 2024-01-01 RX ORDER — CEFTRIAXONE 500 MG/1
1000 INJECTION, POWDER, FOR SOLUTION INTRAMUSCULAR; INTRAVENOUS EVERY 24 HOURS
Refills: 0 | Status: COMPLETED | OUTPATIENT
Start: 2024-01-01 | End: 2024-01-01

## 2024-01-01 RX ORDER — DEXAMETHASONE 0.5 MG/5ML
6 ELIXIR ORAL DAILY
Refills: 0 | Status: DISCONTINUED | OUTPATIENT
Start: 2024-01-01 | End: 2024-01-01

## 2024-01-01 RX ORDER — CHLORHEXIDINE GLUCONATE 213 G/1000ML
1 SOLUTION TOPICAL DAILY
Refills: 0 | Status: DISCONTINUED | OUTPATIENT
Start: 2024-01-01 | End: 2024-01-01

## 2024-01-01 RX ORDER — DEXAMETHASONE 0.5 MG/5ML
1 ELIXIR ORAL
Qty: 30 | Refills: 0
Start: 2024-01-01 | End: 2024-02-17

## 2024-01-01 RX ORDER — ACETAMINOPHEN 500 MG
2 TABLET ORAL
Qty: 0 | Refills: 0 | DISCHARGE

## 2024-01-01 RX ORDER — ERGOCALCIFEROL 1.25 MG/1
50000 CAPSULE ORAL
Refills: 0 | Status: DISCONTINUED | OUTPATIENT
Start: 2024-01-01 | End: 2024-01-01

## 2024-01-01 RX ORDER — LISINOPRIL 2.5 MG/1
0.5 TABLET ORAL
Refills: 0 | DISCHARGE

## 2024-01-01 RX ORDER — AMLODIPINE BESYLATE 2.5 MG/1
10 TABLET ORAL EVERY 24 HOURS
Refills: 0 | Status: DISCONTINUED | OUTPATIENT
Start: 2024-01-01 | End: 2024-01-01

## 2024-01-01 RX ORDER — APIXABAN 2.5 MG/1
0 TABLET, FILM COATED ORAL
Qty: 0 | Refills: 0 | DISCHARGE

## 2024-01-01 RX ORDER — SODIUM CHLORIDE 9 MG/ML
1000 INJECTION INTRAMUSCULAR; INTRAVENOUS; SUBCUTANEOUS
Refills: 0 | Status: DISCONTINUED | OUTPATIENT
Start: 2024-01-01 | End: 2024-01-01

## 2024-01-01 RX ORDER — ACETAMINOPHEN 500 MG
2 TABLET ORAL
Qty: 0 | Refills: 0 | DISCHARGE
Start: 2024-01-01

## 2024-01-01 RX ORDER — LANOLIN ALCOHOL/MO/W.PET/CERES
3 CREAM (GRAM) TOPICAL AT BEDTIME
Refills: 0 | Status: DISCONTINUED | OUTPATIENT
Start: 2024-01-01 | End: 2024-01-01

## 2024-01-01 RX ORDER — AMLODIPINE BESYLATE 2.5 MG/1
1 TABLET ORAL
Qty: 30 | Refills: 0
Start: 2024-01-01 | End: 2024-02-17

## 2024-01-01 RX ORDER — PANTOPRAZOLE SODIUM 20 MG/1
40 TABLET, DELAYED RELEASE ORAL DAILY
Refills: 0 | Status: DISCONTINUED | OUTPATIENT
Start: 2024-01-01 | End: 2024-01-01

## 2024-01-01 RX ORDER — CLOPIDOGREL BISULFATE 75 MG/1
1 TABLET, FILM COATED ORAL
Qty: 0 | Refills: 0 | DISCHARGE
End: 2022-01-10

## 2024-01-01 RX ORDER — ATORVASTATIN CALCIUM 80 MG/1
1 TABLET, FILM COATED ORAL
Qty: 0 | Refills: 0 | DISCHARGE

## 2024-01-01 RX ADMIN — PANTOPRAZOLE SODIUM 40 MILLIGRAM(S): 20 TABLET, DELAYED RELEASE ORAL at 11:38

## 2024-01-01 RX ADMIN — ATORVASTATIN CALCIUM 20 MILLIGRAM(S): 80 TABLET, FILM COATED ORAL at 21:12

## 2024-01-01 RX ADMIN — AZITHROMYCIN 255 MILLIGRAM(S): 500 TABLET, FILM COATED ORAL at 23:13

## 2024-01-01 RX ADMIN — PANTOPRAZOLE SODIUM 40 MILLIGRAM(S): 20 TABLET, DELAYED RELEASE ORAL at 11:40

## 2024-01-01 RX ADMIN — MUPIROCIN 1 APPLICATION(S): 20 OINTMENT TOPICAL at 05:31

## 2024-01-01 RX ADMIN — MUPIROCIN 1 APPLICATION(S): 20 OINTMENT TOPICAL at 05:52

## 2024-01-01 RX ADMIN — CEFTRIAXONE 100 MILLIGRAM(S): 500 INJECTION, POWDER, FOR SOLUTION INTRAMUSCULAR; INTRAVENOUS at 19:22

## 2024-01-01 RX ADMIN — Medication 1 APPLICATION(S): at 22:36

## 2024-01-01 RX ADMIN — LISINOPRIL 10 MILLIGRAM(S): 2.5 TABLET ORAL at 06:25

## 2024-01-01 RX ADMIN — Medication 6 MILLIGRAM(S): at 06:36

## 2024-01-01 RX ADMIN — Medication 1 APPLICATION(S): at 14:04

## 2024-01-01 RX ADMIN — LISINOPRIL 10 MILLIGRAM(S): 2.5 TABLET ORAL at 09:40

## 2024-01-01 RX ADMIN — MUPIROCIN 1 APPLICATION(S): 20 OINTMENT TOPICAL at 22:23

## 2024-01-01 RX ADMIN — MUPIROCIN 1 APPLICATION(S): 20 OINTMENT TOPICAL at 14:04

## 2024-01-01 RX ADMIN — PANTOPRAZOLE SODIUM 40 MILLIGRAM(S): 20 TABLET, DELAYED RELEASE ORAL at 13:39

## 2024-01-01 RX ADMIN — PANTOPRAZOLE SODIUM 40 MILLIGRAM(S): 20 TABLET, DELAYED RELEASE ORAL at 11:45

## 2024-01-01 RX ADMIN — ERGOCALCIFEROL 50000 UNIT(S): 1.25 CAPSULE ORAL at 19:54

## 2024-01-01 RX ADMIN — AMLODIPINE BESYLATE 10 MILLIGRAM(S): 2.5 TABLET ORAL at 21:52

## 2024-01-01 RX ADMIN — Medication 1 APPLICATION(S): at 05:53

## 2024-01-01 RX ADMIN — AMLODIPINE BESYLATE 10 MILLIGRAM(S): 2.5 TABLET ORAL at 19:22

## 2024-01-01 RX ADMIN — CHLORHEXIDINE GLUCONATE 1 APPLICATION(S): 213 SOLUTION TOPICAL at 11:50

## 2024-01-01 RX ADMIN — CEFTRIAXONE 100 MILLIGRAM(S): 500 INJECTION, POWDER, FOR SOLUTION INTRAMUSCULAR; INTRAVENOUS at 20:00

## 2024-01-01 RX ADMIN — AMLODIPINE BESYLATE 10 MILLIGRAM(S): 2.5 TABLET ORAL at 19:49

## 2024-01-01 RX ADMIN — Medication 1 APPLICATION(S): at 22:26

## 2024-01-01 RX ADMIN — Medication 1 APPLICATION(S): at 06:25

## 2024-01-01 RX ADMIN — Medication 1 APPLICATION(S): at 17:58

## 2024-01-01 RX ADMIN — ATORVASTATIN CALCIUM 20 MILLIGRAM(S): 80 TABLET, FILM COATED ORAL at 22:54

## 2024-01-01 RX ADMIN — Medication 1 APPLICATION(S): at 06:36

## 2024-01-01 RX ADMIN — Medication 6 MILLIGRAM(S): at 05:30

## 2024-01-01 RX ADMIN — MUPIROCIN 1 APPLICATION(S): 20 OINTMENT TOPICAL at 05:47

## 2024-01-01 RX ADMIN — Medication 300 MILLIGRAM(S): at 11:45

## 2024-01-01 RX ADMIN — Medication 6 MILLIGRAM(S): at 05:53

## 2024-01-01 RX ADMIN — FINASTERIDE 5 MILLIGRAM(S): 5 TABLET, FILM COATED ORAL at 11:46

## 2024-01-01 RX ADMIN — ATORVASTATIN CALCIUM 20 MILLIGRAM(S): 80 TABLET, FILM COATED ORAL at 21:47

## 2024-01-01 RX ADMIN — MUPIROCIN 1 APPLICATION(S): 20 OINTMENT TOPICAL at 21:12

## 2024-01-01 RX ADMIN — Medication 1 APPLICATION(S): at 22:54

## 2024-01-01 RX ADMIN — PANTOPRAZOLE SODIUM 40 MILLIGRAM(S): 20 TABLET, DELAYED RELEASE ORAL at 13:12

## 2024-01-01 RX ADMIN — AMLODIPINE BESYLATE 10 MILLIGRAM(S): 2.5 TABLET ORAL at 19:36

## 2024-01-01 RX ADMIN — PANTOPRAZOLE SODIUM 40 MILLIGRAM(S): 20 TABLET, DELAYED RELEASE ORAL at 11:07

## 2024-01-01 RX ADMIN — Medication 300 MILLIGRAM(S): at 11:07

## 2024-01-01 RX ADMIN — Medication 1 APPLICATION(S): at 13:00

## 2024-01-01 RX ADMIN — CLOPIDOGREL BISULFATE 75 MILLIGRAM(S): 75 TABLET, FILM COATED ORAL at 11:40

## 2024-01-01 RX ADMIN — MUPIROCIN 1 APPLICATION(S): 20 OINTMENT TOPICAL at 15:13

## 2024-01-01 RX ADMIN — FINASTERIDE 5 MILLIGRAM(S): 5 TABLET, FILM COATED ORAL at 13:38

## 2024-01-01 RX ADMIN — CEFTRIAXONE 100 MILLIGRAM(S): 500 INJECTION, POWDER, FOR SOLUTION INTRAMUSCULAR; INTRAVENOUS at 20:04

## 2024-01-01 RX ADMIN — CHLORHEXIDINE GLUCONATE 1 APPLICATION(S): 213 SOLUTION TOPICAL at 13:11

## 2024-01-01 RX ADMIN — MUPIROCIN 1 APPLICATION(S): 20 OINTMENT TOPICAL at 13:57

## 2024-01-01 RX ADMIN — PANTOPRAZOLE SODIUM 40 MILLIGRAM(S): 20 TABLET, DELAYED RELEASE ORAL at 13:25

## 2024-01-01 RX ADMIN — MUPIROCIN 1 APPLICATION(S): 20 OINTMENT TOPICAL at 05:48

## 2024-01-01 RX ADMIN — Medication 1 APPLICATION(S): at 21:12

## 2024-01-01 RX ADMIN — SODIUM CHLORIDE 100 MILLILITER(S): 9 INJECTION INTRAMUSCULAR; INTRAVENOUS; SUBCUTANEOUS at 11:40

## 2024-01-01 RX ADMIN — MUPIROCIN 1 APPLICATION(S): 20 OINTMENT TOPICAL at 18:00

## 2024-01-01 RX ADMIN — CLOPIDOGREL BISULFATE 75 MILLIGRAM(S): 75 TABLET, FILM COATED ORAL at 11:46

## 2024-01-01 RX ADMIN — ATORVASTATIN CALCIUM 20 MILLIGRAM(S): 80 TABLET, FILM COATED ORAL at 22:23

## 2024-01-01 RX ADMIN — CHLORHEXIDINE GLUCONATE 1 APPLICATION(S): 213 SOLUTION TOPICAL at 13:26

## 2024-01-01 RX ADMIN — MUPIROCIN 1 APPLICATION(S): 20 OINTMENT TOPICAL at 06:36

## 2024-01-01 RX ADMIN — ATORVASTATIN CALCIUM 20 MILLIGRAM(S): 80 TABLET, FILM COATED ORAL at 22:27

## 2024-01-01 RX ADMIN — CEFTRIAXONE 100 MILLIGRAM(S): 500 INJECTION, POWDER, FOR SOLUTION INTRAMUSCULAR; INTRAVENOUS at 19:02

## 2024-01-01 RX ADMIN — CHLORHEXIDINE GLUCONATE 1 APPLICATION(S): 213 SOLUTION TOPICAL at 11:46

## 2024-01-01 RX ADMIN — MUPIROCIN 1 APPLICATION(S): 20 OINTMENT TOPICAL at 21:47

## 2024-01-01 RX ADMIN — AMLODIPINE BESYLATE 10 MILLIGRAM(S): 2.5 TABLET ORAL at 21:47

## 2024-01-01 RX ADMIN — MUPIROCIN 1 APPLICATION(S): 20 OINTMENT TOPICAL at 21:52

## 2024-01-01 RX ADMIN — CHLORHEXIDINE GLUCONATE 1 APPLICATION(S): 213 SOLUTION TOPICAL at 11:39

## 2024-01-01 RX ADMIN — Medication 6 MILLIGRAM(S): at 05:47

## 2024-01-01 RX ADMIN — MUPIROCIN 1 APPLICATION(S): 20 OINTMENT TOPICAL at 22:35

## 2024-01-01 RX ADMIN — Medication 300 MILLIGRAM(S): at 13:39

## 2024-01-01 RX ADMIN — Medication 300 MILLIGRAM(S): at 20:01

## 2024-01-01 RX ADMIN — PANTOPRAZOLE SODIUM 40 MILLIGRAM(S): 20 TABLET, DELAYED RELEASE ORAL at 17:18

## 2024-01-01 RX ADMIN — Medication 3 MILLIGRAM(S): at 22:27

## 2024-01-01 RX ADMIN — FINASTERIDE 5 MILLIGRAM(S): 5 TABLET, FILM COATED ORAL at 11:08

## 2024-01-01 RX ADMIN — FINASTERIDE 5 MILLIGRAM(S): 5 TABLET, FILM COATED ORAL at 13:25

## 2024-01-01 RX ADMIN — Medication 6 MILLIGRAM(S): at 05:16

## 2024-01-01 RX ADMIN — CLOPIDOGREL BISULFATE 75 MILLIGRAM(S): 75 TABLET, FILM COATED ORAL at 13:26

## 2024-01-01 RX ADMIN — CHLORHEXIDINE GLUCONATE 1 APPLICATION(S): 213 SOLUTION TOPICAL at 11:41

## 2024-01-01 RX ADMIN — Medication 1 APPLICATION(S): at 13:52

## 2024-01-01 RX ADMIN — Medication 6 MILLIGRAM(S): at 17:18

## 2024-01-01 RX ADMIN — Medication 1 APPLICATION(S): at 22:28

## 2024-01-01 RX ADMIN — FINASTERIDE 5 MILLIGRAM(S): 5 TABLET, FILM COATED ORAL at 11:39

## 2024-01-01 RX ADMIN — ATORVASTATIN CALCIUM 20 MILLIGRAM(S): 80 TABLET, FILM COATED ORAL at 23:22

## 2024-01-01 RX ADMIN — CLOPIDOGREL BISULFATE 75 MILLIGRAM(S): 75 TABLET, FILM COATED ORAL at 11:07

## 2024-01-01 RX ADMIN — MUPIROCIN 1 APPLICATION(S): 20 OINTMENT TOPICAL at 13:52

## 2024-01-01 RX ADMIN — FINASTERIDE 5 MILLIGRAM(S): 5 TABLET, FILM COATED ORAL at 13:12

## 2024-01-01 RX ADMIN — CEFTRIAXONE 100 MILLIGRAM(S): 500 INJECTION, POWDER, FOR SOLUTION INTRAMUSCULAR; INTRAVENOUS at 19:48

## 2024-01-01 RX ADMIN — Medication 300 MILLIGRAM(S): at 13:12

## 2024-01-01 RX ADMIN — Medication 6 MILLIGRAM(S): at 09:40

## 2024-01-01 RX ADMIN — CLOPIDOGREL BISULFATE 75 MILLIGRAM(S): 75 TABLET, FILM COATED ORAL at 11:45

## 2024-01-01 RX ADMIN — CHLORHEXIDINE GLUCONATE 1 APPLICATION(S): 213 SOLUTION TOPICAL at 11:49

## 2024-01-01 RX ADMIN — AMLODIPINE BESYLATE 10 MILLIGRAM(S): 2.5 TABLET ORAL at 19:23

## 2024-01-01 RX ADMIN — Medication 6 MILLIGRAM(S): at 06:24

## 2024-01-01 RX ADMIN — SODIUM CHLORIDE 500 MILLILITER(S): 9 INJECTION INTRAMUSCULAR; INTRAVENOUS; SUBCUTANEOUS at 20:00

## 2024-01-01 RX ADMIN — PANTOPRAZOLE SODIUM 40 MILLIGRAM(S): 20 TABLET, DELAYED RELEASE ORAL at 11:46

## 2024-01-01 RX ADMIN — Medication 6 MILLIGRAM(S): at 05:49

## 2024-01-01 RX ADMIN — Medication 1 APPLICATION(S): at 13:26

## 2024-01-01 RX ADMIN — SODIUM CHLORIDE 1000 MILLILITER(S): 9 INJECTION INTRAMUSCULAR; INTRAVENOUS; SUBCUTANEOUS at 12:57

## 2024-01-01 RX ADMIN — CLOPIDOGREL BISULFATE 75 MILLIGRAM(S): 75 TABLET, FILM COATED ORAL at 13:38

## 2024-01-01 RX ADMIN — AZITHROMYCIN 255 MILLIGRAM(S): 500 TABLET, FILM COATED ORAL at 22:53

## 2024-01-01 RX ADMIN — LISINOPRIL 10 MILLIGRAM(S): 2.5 TABLET ORAL at 22:54

## 2024-01-01 RX ADMIN — CLOPIDOGREL BISULFATE 75 MILLIGRAM(S): 75 TABLET, FILM COATED ORAL at 11:39

## 2024-01-01 RX ADMIN — Medication 300 MILLIGRAM(S): at 11:46

## 2024-01-01 RX ADMIN — CLOPIDOGREL BISULFATE 75 MILLIGRAM(S): 75 TABLET, FILM COATED ORAL at 13:11

## 2024-01-01 RX ADMIN — AZITHROMYCIN 255 MILLIGRAM(S): 500 TABLET, FILM COATED ORAL at 23:22

## 2024-01-01 RX ADMIN — ATORVASTATIN CALCIUM 20 MILLIGRAM(S): 80 TABLET, FILM COATED ORAL at 22:35

## 2024-01-01 RX ADMIN — Medication 1 APPLICATION(S): at 05:30

## 2024-01-01 RX ADMIN — ATORVASTATIN CALCIUM 20 MILLIGRAM(S): 80 TABLET, FILM COATED ORAL at 21:52

## 2024-01-01 RX ADMIN — Medication 300 MILLIGRAM(S): at 11:40

## 2024-01-01 RX ADMIN — AMLODIPINE BESYLATE 10 MILLIGRAM(S): 2.5 TABLET ORAL at 20:01

## 2024-01-01 RX ADMIN — FINASTERIDE 5 MILLIGRAM(S): 5 TABLET, FILM COATED ORAL at 11:41

## 2024-01-01 RX ADMIN — Medication 300 MILLIGRAM(S): at 13:26

## 2024-01-01 RX ADMIN — FINASTERIDE 5 MILLIGRAM(S): 5 TABLET, FILM COATED ORAL at 20:01

## 2024-01-01 RX ADMIN — Medication 300 MILLIGRAM(S): at 11:39

## 2024-01-01 RX ADMIN — AMLODIPINE BESYLATE 10 MILLIGRAM(S): 2.5 TABLET ORAL at 22:54

## 2024-01-01 RX ADMIN — CHLORHEXIDINE GLUCONATE 1 APPLICATION(S): 213 SOLUTION TOPICAL at 13:39

## 2024-01-11 NOTE — ED ADULT NURSE REASSESSMENT NOTE - NS ED NURSE REASSESS COMMENT FT1
Report received from LIZZIE Momin. Pt A&Ox3 sitting up in stretcher. Pt [place]. RVP sent per MD orders. EKG performed at this time. No acute distress noted, pt denies any complaints at this time. Report given to LIZZIE Bartlett. Report received from LIZZIE Momin. Pt A&Ox3 sitting up in stretcher. Pt [place]. RVP sent per MD orders. EKG performed at this time. No acute distress noted, pt denies any complaints at this time. Report given to LIZZIE Bartlett. Report received from LIZZIE Momin. Pt A&Ox3 sitting up in stretcher. Pt [place]. RVP sent per MD orders. EKG performed at this time. EKG sent via teams to [place]. No acute distress noted, pt denies any complaints at this time. Report given to LIZZIE Bartlett. Report received from LIZZIE Momin. Pt A&Ox3 sitting up in stretcher. Pt [place]. RVP sent per MD orders. EKG performed at this time. EKG sent via teams to [place] Dhal[place]. No acute distress noted, pt denies any complaints at this time. Report given to LIZZIE Bartlett.

## 2024-01-11 NOTE — H&P ADULT - PROBLEM SELECTOR PLAN 2
Pt with GRAEME on CKD   -Most likely due dehydration   -F/u with CT A/P to r/o obstruction   -F/u with UA and urine studies   -Hold lasix for now   -Will give 1L NS x1 Pt with GRAEME on CKD   -Most likely due to dehydration   -CT A/P for restaging will assess for obstruction as well    -F/u with UA and urine studies   -Hold lasix for now   -Will give 1L NS x1

## 2024-01-11 NOTE — ED ADULT NURSE NOTE - NSFALLRISKINTERV_ED_ALL_ED
Assistance OOB with selected safe patient handling equipment if applicable/Assistance with ambulation/Communicate fall risk and risk factors to all staff, patient, and family/Encourage patient to sit up slowly, dangle for a short time, stand at bedside before walking/Monitor gait and stability/Orthostatic vital signs/Provide patient with walking aids/Provide visual cue: yellow wristband, yellow gown, etc/Reinforce activity limits and safety measures with patient and family/Call bell, personal items and telephone in reach/Instruct patient to call for assistance before getting out of bed/chair/stretcher/Non-slip footwear applied when patient is off stretcher/[place] to call system/Physically safe environment - no spills, clutter or unnecessary equipment/Purposeful Proactive Rounding/Room/bathroom lighting operational, light cord in reach Assistance OOB with selected safe patient handling equipment if applicable/Assistance with ambulation/Communicate fall risk and risk factors to all staff, patient, and family/Encourage patient to sit up slowly, dangle for a short time, stand at bedside before walking/Monitor gait and stability/Orthostatic vital signs/Provide patient with walking aids/Provide visual cue: yellow wristband, yellow gown, etc/Reinforce activity limits and safety measures with patient and family/Call bell, personal items and telephone in reach/Instruct patient to call for assistance before getting out of bed/chair/stretcher/Non-slip footwear applied when patient is off stretcher/[place] to call system/Physically safe environment - no spills, clutter or unnecessary equipment/Purposeful Proactive Rounding/Room/bathroom lighting operational, light cord in reach

## 2024-01-11 NOTE — H&P ADULT - PROBLEM SELECTOR PLAN 1
Patient with productive cough. SOB, and generalized weakness   -Suspect PNA given symptoms, immunocompromised state, and hx of lung cancer   -Will treat with ceftriaxone and azithromycin IV   -F/u with full RVP and MRSA   -Monitor SO2   -F/u with CT chest

## 2024-01-11 NOTE — H&P ADULT - PROBLEM SELECTOR PLAN 4
Pt with hx SCLC previously on carbo/etoposide/atezo (followed by Dr. 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

## 2024-01-11 NOTE — CONSULT NOTE ADULT - SUBJECTIVE AND OBJECTIVE BOX
Neurology - Consult Note    -  Spectra: [phone] ([facility]), [phone] ([facility])  -    HPI: Patient WILMER MORALES is a 78y (1945) man with a PMHx significant for with  SCLC previously on carbo/etoposide/atezo (followed by Dr. Lin at [facility]), s/p SRS to single brain met left frontal      Review of Systems:   All other review of systems is negative unless indicated above.    Allergies:  No Known Allergies      PMHx/PSHx/Family Hx: As above, otherwise see below   HTN (hypertension)    Hepatitis C    Gout    PAD (peripheral artery disease)    Former smoker    CAD (coronary artery disease)    H/O carotid stenosis    COVID-19    Superficial thrombophlebitis of right leg        Social Hx:  No current use of tobacco, alcohol, or illicit drugs  Lives with ***    Medications:  MEDICATIONS  (STANDING):    MEDICATIONS  (PRN):      Vitals:  T(C): 36.8 (01-11-24 @ 10:16), Max: 36.8 (01-11-24 @ 10:16)  HR: 98 (01-11-24 @ 10:16) (98 - 98)  BP: 125/65 (01-11-24 @ 10:16) (125/65 - 125/65)  RR: 16 (01-11-24 @ 10:16) (16 - 16)  SpO2: 99% (01-11-24 @ 10:16) (99% - 99%)    Physical Examination: INCOMPLETE  General - NAD  Cardiovascular - Peripheral pulses palpable, no edema  Eyes - Fundoscopy with flat, sharp optic discs and no hemorrhage or exudates; Fundoscopy not well visualized; Fundoscopy not performed due to safety precautions in the setting of the COVID-19 pandemic    Neurologic Exam:  Mental status - Awake, Alert, Oriented to person, place, and time. Speech fluent, repetition and naming intact. Follows simple and complex commands. Attention/concentration, recent and remote memory (including registration and recall), and fund of knowledge intact    Cranial nerves - PERRLA, VFF, EOMI, face sensation (V1-V3) intact b/l, facial strength intact without asymmetry b/l, hearing intact b/l, palate with symmetric elevation, trapezius OR sternocleidomastiod 5/5 strength b/l, tongue midline on protrusion with full lateral movement    Motor - Normal bulk and tone throughout. No pronator drift.  Strength testing            Deltoid      Biceps      Triceps     Wrist Extension    Wrist Flexion     Interossei         R            5                 5               5                     5                              5                        5                 5  L             5                 5               5                     5                              5                        5                 5              Hip Flexion    Hip Extension    Knee Flexion    Knee Extension    Dorsiflexion    Plantar Flexion  R              5                           5                       5                           5                            5                          5  L              5                           5                        5                           5                            5                          5    Sensation - Light touch/temperature OR pain/vibration intact throughout    DTR's -             Biceps      Triceps     Brachioradialis      Patellar    Ankle    Toes/plantar response  R             2+             2+                  2+                       2+            2+                 Down  L              2+             2+                 2+                        2+           2+                 Down    Coordination - Finger to Nose intact b/l. No tremors appreciated    Gait and station - Normal casual gait. Romberg (-)    Labs:                        10.5   8.38  )-----------( 234      ( 11 Jan 2024 12:21 )             33.0     01-11    140  |  103  |  27<H>  ----------------------------<  105<H>  4.4   |  24  |  1.80<H>    Ca    7.9<L>      11 Jan 2024 12:21    TPro  6.2  /  Alb  2.8<L>  /  TBili  0.3  /  DBili  x   /  AST  21  /  ALT  25  /  AlkPhos  89  01-11    CAPILLARY BLOOD GLUCOSE        LIVER FUNCTIONS - ( 11 Jan 2024 12:21 )  Alb: 2.8 g/dL / Pro: 6.2 g/dL / ALK PHOS: 89 U/L / ALT: 25 U/L / AST: 21 U/L / GGT: x             PT/INR - ( 11 Jan 2024 12:21 )   PT: 12.5 sec;   INR: 1.11 ratio         PTT - ( 11 Jan 2024 12:21 )  PTT:27.1 sec          Radiology:  CT Head No Cont:  (11 Jan 2024 13:48)     Neurology - Consult Note    -  Spectra: [phone] ([facility]), [phone] ([facility])  -    HPI: Patient WILMER MORALES is a 78y (1945) man with a PMHx significant for with  SCLC previously on carbo/etoposide/atezo (followed by Dr. Lin at [facility]), s/p SRS to single brain met left frontal      Review of Systems:   All other review of systems is negative unless indicated above.    Allergies:  No Known Allergies      PMHx/PSHx/Family Hx: As above, otherwise see below   HTN (hypertension)    Hepatitis C    Gout    PAD (peripheral artery disease)    Former smoker    CAD (coronary artery disease)    H/O carotid stenosis    COVID-19    Superficial thrombophlebitis of right leg        Social Hx:  No current use of tobacco, alcohol, or illicit drugs  Lives with ***    Medications:  MEDICATIONS  (STANDING):    MEDICATIONS  (PRN):      Vitals:  T(C): 36.8 (01-11-24 @ 10:16), Max: 36.8 (01-11-24 @ 10:16)  HR: 98 (01-11-24 @ 10:16) (98 - 98)  BP: 125/65 (01-11-24 @ 10:16) (125/65 - 125/65)  RR: 16 (01-11-24 @ 10:16) (16 - 16)  SpO2: 99% (01-11-24 @ 10:16) (99% - 99%)    Physical Examination: INCOMPLETE  General - NAD  Cardiovascular - Peripheral pulses palpable, no edema  Eyes - Fundoscopy with flat, sharp optic discs and no hemorrhage or exudates; Fundoscopy not well visualized; Fundoscopy not performed due to safety precautions in the setting of the COVID-19 pandemic    Neurologic Exam:  Mental status - Awake, Alert, Oriented to person, place, and time. Speech fluent, repetition and naming intact. Follows simple and complex commands. Attention/concentration, recent and remote memory (including registration and recall), and fund of knowledge intact    Cranial nerves - PERRLA, VFF, EOMI, face sensation (V1-V3) intact b/l, facial strength intact without asymmetry b/l, hearing intact b/l, palate with symmetric elevation, trapezius OR sternocleidomastiod 5/5 strength b/l, tongue midline on protrusion with full lateral movement    Motor - Normal bulk and tone throughout. No pronator drift.  Strength testing            Deltoid      Biceps      Triceps     Wrist Extension    Wrist Flexion     Interossei         R            5                 5               5                     5                              5                        5                 5  L             5                 5               5                     5                              5                        5                 5              Hip Flexion    Hip Extension    Knee Flexion    Knee Extension    Dorsiflexion    Plantar Flexion  R              5                           5                       5                           5                            5                          5  L              5                           5                        5                           5                            5                          5    Sensation - Light touch/temperature OR pain/vibration intact throughout    DTR's -             Biceps      Triceps     Brachioradialis      Patellar    Ankle    Toes/plantar response  R             2+             2+                  2+                       2+            2+                 Down  L              2+             2+                 2+                        2+           2+                 Down    Coordination - Finger to Nose intact b/l. No tremors appreciated    Gait and station - Normal casual gait. Romberg (-)    Labs:                        10.5   8.38  )-----------( 234      ( 11 Jan 2024 12:21 )             33.0     01-11    140  |  103  |  27<H>  ----------------------------<  105<H>  4.4   |  24  |  1.80<H>    Ca    7.9<L>      11 Jan 2024 12:21    TPro  6.2  /  Alb  2.8<L>  /  TBili  0.3  /  DBili  x   /  AST  21  /  ALT  25  /  AlkPhos  89  01-11    CAPILLARY BLOOD GLUCOSE        LIVER FUNCTIONS - ( 11 Jan 2024 12:21 )  Alb: 2.8 g/dL / Pro: 6.2 g/dL / ALK PHOS: 89 U/L / ALT: 25 U/L / AST: 21 U/L / GGT: x             PT/INR - ( 11 Jan 2024 12:21 )   PT: 12.5 sec;   INR: 1.11 ratio         PTT - ( 11 Jan 2024 12:21 )  PTT:27.1 sec          Radiology:  CT Head No Cont:  (11 Jan 2024 13:48)     Neurology - Consult Note    -  Spectra: [phone] ([facility]), [phone] ([facility])  -    HPI: Patient WILMER MORALES is a 78y (1945) man with a PMHx significant for with  SCLC previously on carbo/etoposide/atezo (followed by Dr. Lin at [facility]), s/p SRS to single brain met left frontal, [place] left ear presenting with upper and lower extremity weakness since Thursday after lumbar puncture. Daughter at bedside reports patient has generalized weakness after the LP. He usually walks unassisted, however now requires use of a cane. Denies recent falls. weakness, numbness, changes to speech, changes to visions.       Review of Systems:   All other review of systems is negative unless indicated above.    Allergies:  No Known Allergies      PMHx/PSHx/Family Hx: As above, otherwise see below   HTN (hypertension)    Hepatitis C    Gout    PAD (peripheral artery disease)    Former smoker    CAD (coronary artery disease)    H/O carotid stenosis    COVID-19    Superficial thrombophlebitis of right leg        Social Hx:  No current use of tobacco, alcohol, or illicit drugs      Medications:  MEDICATIONS  (STANDING):    MEDICATIONS  (PRN):      Vitals:  T(C): 36.8 (01-11-24 @ 10:16), Max: 36.8 (01-11-24 @ 10:16)  HR: 98 (01-11-24 @ 10:16) (98 - 98)  BP: 125/65 (01-11-24 @ 10:16) (125/65 - 125/65)  RR: 16 (01-11-24 @ 10:16) (16 - 16)  SpO2: 99% (01-11-24 @ 10:16) (99% - 99%)    Physical Examination:   General - NAD      Neurologic Exam:  Mental status - Awake, Alert, Oriented to person, place, to year. Speech fluent, repetition and naming intact. Follows commands    Cranial nerves - PERRL, VFF, EOMI, face sensation (V1-V3) intact b/l, facial strength intact without asymmetry b/l, [place] left ear, hearing aid right ear, palate with symmetric elevation, trapezius 5/5 strength b/l, tongue midline on protrusion with full lateral movement    Motor - Normal bulk and tone throughout. No pronator drift.  Strength testing            Deltoid      Biceps      Triceps     Wrist Extension    Wrist Flexion           R            5                 5               5                     5                              5                        5                   L             5                 5               5                     5                              5                        5                               Hip Flexion     Knee Flexion    Knee Extension    Dorsiflexion    Plantar Flexion  R              4+                         5                        5                 4+                  4+  L              4+                          5                        5                4+                   4+                          Sensation - Light touch/temperature/pinprick/vibration intact throughout    DTR's -             Biceps      Triceps     Brachioradialis           Patellar    Ankle    Toes/plantar response  R             2+             2+                  2+                       1+         1+                 Down  L              2+             2+                 2+                        1+          1+                 Down    Coordination - Finger to Nose intact b/l. No tremors appreciated    Gait and station - Deffered due to fall safety risk     Labs:                        10.5   8.38  )-----------( 234      ( 11 Jan 2024 12:21 )             33.0     01-11    140  |  103  |  27<H>  ----------------------------<  105<H>  4.4   |  24  |  1.80<H>    Ca    7.9<L>      11 Jan 2024 12:21    TPro  6.2  /  Alb  2.8<L>  /  TBili  0.3  /  DBili  x   /  AST  21  /  ALT  25  /  AlkPhos  89  01-11    CAPILLARY BLOOD GLUCOSE        LIVER FUNCTIONS - ( 11 Jan 2024 12:21 )  Alb: 2.8 g/dL / Pro: 6.2 g/dL / ALK PHOS: 89 U/L / ALT: 25 U/L / AST: 21 U/L / GGT: x             PT/INR - ( 11 Jan 2024 12:21 )   PT: 12.5 sec;   INR: 1.11 ratio         PTT - ( 11 Jan 2024 12:21 )  PTT:27.1 sec          Radiology:  CT Head No Cont:  (11 Jan 2024 13:48)    < from: CT Head No Cont (01.11.24 @ 13:48) >  IMPRESSION:    1.  No evidence of acute intracranial hemorrhage or midline shift.  2.  Small focus of encephalomalacia/gliotic change in the left   postcentral gyrus corresponding to the region of enhancement on recent   MRI. No large bulk mass lesion is identified in the affected region.   Note, CT imaging is insensitive to small CNS metastasis. Recommend MRI of   the brain with contrast for further evaluation.  3.  Bilateral mastoid effusions with questionable fluid/debris in the   middle ear cavities. Recommend clinical correlation for otomastoiditis.    < end of copied text >   Neurology - Consult Note    -  Spectra: [phone] ([facility]), [phone] ([facility])  -    HPI: Patient WILMER MORALES is a 78y (1945) man with a PMHx significant for with  SCLC previously on carbo/etoposide/atezo (followed by Dr. Lin at [facility]), s/p SRS to single brain met left frontal, [place] left ear presenting with upper and lower extremity weakness since Thursday after lumbar puncture. Daughter at bedside reports patient has generalized weakness after the LP. He usually walks unassisted, however now requires use of a cane. Denies recent falls. weakness, numbness, changes to speech, changes to visions.       Review of Systems:   All other review of systems is negative unless indicated above.    Allergies:  No Known Allergies      PMHx/PSHx/Family Hx: As above, otherwise see below   HTN (hypertension)    Hepatitis C    Gout    PAD (peripheral artery disease)    Former smoker    CAD (coronary artery disease)    H/O carotid stenosis    COVID-19    Superficial thrombophlebitis of right leg        Social Hx:  No current use of tobacco, alcohol, or illicit drugs      Medications:  MEDICATIONS  (STANDING):    MEDICATIONS  (PRN):      Vitals:  T(C): 36.8 (01-11-24 @ 10:16), Max: 36.8 (01-11-24 @ 10:16)  HR: 98 (01-11-24 @ 10:16) (98 - 98)  BP: 125/65 (01-11-24 @ 10:16) (125/65 - 125/65)  RR: 16 (01-11-24 @ 10:16) (16 - 16)  SpO2: 99% (01-11-24 @ 10:16) (99% - 99%)    Physical Examination:   General - NAD      Neurologic Exam:  Mental status - Awake, Alert, Oriented to person, place, to year. Speech fluent, repetition and naming intact. Follows commands    Cranial nerves - PERRL, VFF, EOMI, face sensation (V1-V3) intact b/l, facial strength intact without asymmetry b/l, [place] left ear, hearing aid right ear, palate with symmetric elevation, trapezius 5/5 strength b/l, tongue midline on protrusion with full lateral movement    Motor - Normal bulk and tone throughout. No pronator drift.  Strength testing            Deltoid      Biceps      Triceps     Wrist Extension    Wrist Flexion           R            5                 5               5                     5                              5                        5                   L             5                 5               5                     5                              5                        5                               Hip Flexion     Knee Flexion    Knee Extension    Dorsiflexion    Plantar Flexion  R              4+                         5                        5                 4+                  4+  L              4+                          5                        5                4+                   4+                          Sensation - Light touch/temperature/pinprick/vibration intact throughout    DTR's -             Biceps      Triceps     Brachioradialis           Patellar    Ankle    Toes/plantar response  R             2+             2+                  2+                       1+         1+                 Down  L              2+             2+                 2+                        1+          1+                 Down    Coordination - Finger to Nose intact b/l. No tremors appreciated    Gait and station - Deffered due to fall safety risk     Labs:                        10.5   8.38  )-----------( 234      ( 11 Jan 2024 12:21 )             33.0     01-11    140  |  103  |  27<H>  ----------------------------<  105<H>  4.4   |  24  |  1.80<H>    Ca    7.9<L>      11 Jan 2024 12:21    TPro  6.2  /  Alb  2.8<L>  /  TBili  0.3  /  DBili  x   /  AST  21  /  ALT  25  /  AlkPhos  89  01-11    CAPILLARY BLOOD GLUCOSE        LIVER FUNCTIONS - ( 11 Jan 2024 12:21 )  Alb: 2.8 g/dL / Pro: 6.2 g/dL / ALK PHOS: 89 U/L / ALT: 25 U/L / AST: 21 U/L / GGT: x             PT/INR - ( 11 Jan 2024 12:21 )   PT: 12.5 sec;   INR: 1.11 ratio         PTT - ( 11 Jan 2024 12:21 )  PTT:27.1 sec          Radiology:  CT Head No Cont:  (11 Jan 2024 13:48)    < from: CT Head No Cont (01.11.24 @ 13:48) >  IMPRESSION:    1.  No evidence of acute intracranial hemorrhage or midline shift.  2.  Small focus of encephalomalacia/gliotic change in the left   postcentral gyrus corresponding to the region of enhancement on recent   MRI. No large bulk mass lesion is identified in the affected region.   Note, CT imaging is insensitive to small CNS metastasis. Recommend MRI of   the brain with contrast for further evaluation.  3.  Bilateral mastoid effusions with questionable fluid/debris in the   middle ear cavities. Recommend clinical correlation for otomastoiditis.    < end of copied text >

## 2024-01-11 NOTE — ED ADULT TRIAGE NOTE - CHIEF COMPLAINT QUOTE
Pt history of metastatic lung ca, presents for generalized weakness over the past week. Pt has spinal tap on 1/4/2024. Pt breathing even and mildly labored at rest, afebrile.

## 2024-01-11 NOTE — H&P ADULT - ASSESSMENT
77 yo M with Pmhx metastatic lung cancer, HLD, CAD, PAD, gout, and HTN presents to the ED with weakness and cough, possibly due to PNA.

## 2024-01-11 NOTE — H&P ADULT - NSHPREVIEWOFSYSTEMS_GEN_ALL_CORE
REVIEW OF SYSTEMS:    CONSTITUTIONAL: No weakness, fevers or chills  EYES/ENT: No visual changes;  No vertigo or throat pain   NECK: No pain or stiffness  RESPIRATORY: + cough and SOB  CARDIOVASCULAR: No chest pain or palpitations  GASTROINTESTINAL: No abdominal or epigastric pain. No nausea, vomiting, or hematemesis; No diarrhea or constipation. No melena or hematochezia.  GENITOURINARY: No dysuria, frequency or hematuria  NEUROLOGICAL: No numbness or weakness  MUSCULOSKELETAL: No joint pain, no muscle ache   SKIN: No itching, burning, rashes, or lesions   All other review of systems is negative unless indicated above.

## 2024-01-11 NOTE — H&P ADULT - PROBLEM SELECTOR PLAN 3
Patient with LE weakness post LP   -However, on exam, 5/5 strength in LE  -CT head showed Small focus of encephalomalacia/gliotic change in the left postcentral gyrus. MR head showed possible leptomeningeal metastasis, confirmed by recent LP   -Neurology consulted, recs appreciated   -F/u with MR lumbar and thoracic spine   -C/w decadron 6 mg daily   -PT eval   -Fall precautions

## 2024-01-11 NOTE — H&P ADULT - NSHPLABSRESULTS_GEN_ALL_CORE
10.5   8.38  )-----------( 234      ( 11 Jan 2024 12:21 )             33.0     Hgb Trend: 10.5<--  01-11    140  |  103  |  27<H>  ----------------------------<  105<H>  4.4   |  24  |  1.80<H>    Ca    7.9<L>      11 Jan 2024 12:21    TPro  6.2  /  Alb  2.8<L>  /  TBili  0.3  /  DBili  x   /  AST  21  /  ALT  25  /  AlkPhos  89  01-11    Creatinine Trend: 1.80<--  PT/INR - ( 11 Jan 2024 12:21 )   PT: 12.5 sec;   INR: 1.11 ratio         PTT - ( 11 Jan 2024 12:21 )  PTT:27.1 sec      Urinalysis Basic - ( 11 Jan 2024 12:21 )    Color: x / Appearance: x / SG: x / pH: x  Gluc: 105 mg/dL / Ketone: x  / Bili: x / Urobili: x   Blood: x / Protein: x / Nitrite: x   Leuk Esterase: x / RBC: x / WBC x   Sq Epi: x / Non Sq Epi: x / Bacteria: x      CT head as reviewed by the radiologist:     1.  No evidence of acute intracranial hemorrhage or midline shift.  2.  Small focus of encephalomalacia/gliotic change in the left   postcentral gyrus corresponding to the region of enhancement on recent   MRI. No large bulk mass lesion is identified in the affected region.   Note, CT imaging is insensitive to small CNS metastasis. Recommend MRI of   the brain with contrast for further evaluation.  3.  Bilateral mastoid effusions with questionable fluid/debris in the   middle ear cavities. Recommend clinical correlation for otomastoiditis.

## 2024-01-11 NOTE — H&P ADULT - HISTORY OF PRESENT ILLNESS
79 yo M with Pmhx metastatic lung cancer, HLD, CAD, PAD, gout, and HTN presents to the ED with weakness and cough, Pt reports that he has been feeling weak for the last 1 week. He has been mostly in his bed and felt dizzy whenever he tried to move or stand. He also had a productive cough and CHOI. Denies any recent fever, chills, nausea, vomiting, abdominal pain or diarrhea. 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 Because of this new finding and worsening symptoms, his oncologist recommended him to come to the ED.   In the ED, his vitals were notable for HTN. Labs were notable for chronic anemia, GRAEME on CKD. CT head showed no acute mass or bleeding.

## 2024-01-11 NOTE — CHART NOTE - NSCHARTNOTEFT_GEN_A_CORE
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

## 2024-01-11 NOTE — CONSULT NOTE ADULT - ASSESSMENT
78y (1945) man with a PMHx significant for with  SCLC previously on carbo/etoposide/atezo (followed by Dr. Lin at [facility]), s/p SRS to single brain met left frontal, [place] left ear presenting with upper and lower extremity weakness since Thursday after lumbar puncture. CTH Small focus of encephalomalacia/gliotic change in the left postcentral gyrus.     Impression: Upper and lower extremity weakness due to toxic, metabolic infectious etiology vs rule out lepto in cauda    Recommendation:  [x] CTH (results as above)  [] Rad onc consult  [] MRI thoracic and lumbar spine w/w/o  [] CT C/A/P  [] IVF for hydration  [] Convert home prednisone 40 to decadron 6 daily   [] Can stop home cellcept    Discussed with Dr. Mejia.     78y (1945) man with a PMHx significant for with  SCLC previously on carbo/etoposide/atezo (followed by Dr. Lin at [facility]), s/p SRS to single brain met left frontal, [place] left ear presenting with upper and lower extremity weakness since Thursday after lumbar puncture. CTH Small focus of encephalomalacia/gliotic change in the left postcentral gyrus.     Impression: Upper and lower extremity weakness due to toxic, metabolic infectious etiology vs rule out lepto in cauda    Recommendation:  [x] CTH (results as above)  [] Rad onc consult  [] MRI thoracic and lumbar spine w/w/o  [] CT C/A/P  [] IVF for hydration  [] Convert home prednisone 40 to decadron 6 daily   [] Can stop home cellcept    Discussed with Dr. Mejia.

## 2024-01-11 NOTE — CONSULT NOTE ADULT - ATTENDING COMMENTS
NEURO-ONCOLOGY ATTENDING    Seen and examined     patient well known to me, 80 SCLC, with brain met treated with SRS in 5/23 to left frontal lesion with excellent response  systemically treated by Dr. Lin at [facility] [place], with carbo/etop/atezo followed by atezo maintenance  in 11/23 developed vertigo, and bilat CN 7/8 enhancement  he was treated with cellcept and steroids for presumed ICI toxicity, but MRI worsened and was sent for LP last week, which demonstrated 13 TNC, pr 78 and positive cytology for SCLC    He was to be referred to rad onc but declined over a few days, bedridden for 16+ hours a day  no headaches, neck/back pain, n/v, diplopia or confusion  Sent o ER, where found to have viral infection (coronavirus, non COVID), and patchy infiltrate on CT chest with elevated cr to 1.9  today he is improved with hydration and abx, back to baseline    on exam he is [place] but otherwise is neurologically intact, and walks on own    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 which is causing symptoms in lower cranial nerves, but recent decline may more likely be due to viral infection and GRAEME    We discussed the relative merits of WBRT versus base of skull RT with the hopes of treating symptomatic disease while limiting toxicity  We also discussed the need for spine imaging, and I did address his overall prognosis in the setting of SCLC with lepto  nonetheless he remains minimally symptomatic and prognosis may be better than initially thought on admission    Plan for inpatient RT to begin today  decadron 6 mg daily, taper to 4 daily on dc  stop cellcept   supportive care  MRI T and L spine with gado while inpatient  if he is feeling well enough and imaging has been obtained may be reasonable to d/c mid RT to complete as outpatient late next week NEURO-ONCOLOGY ATTENDING    Seen and examined     patient well known to me, 80 SCLC, with brain met treated with SRS in 5/23 to left frontal lesion with excellent response  systemically treated by Dr. Lin at [facility], with carbo/etop/atezo followed by atezo maintenance  in 11/23 developed vertigo, and bilat CN 7/8 enhancement  he was treated with cellcept and steroids for presumed ICI toxicity, but MRI worsened and was sent for LP last week, which demonstrated 13 TNC, pr 78 and positive cytology for SCLC    He was to be referred to rad onc but declined over a few days, bedridden for 16+ hours a day  no headaches, neck/back pain, n/v, diplopia or confusion  Sent o ER, where found to have viral infection (coronavirus, non COVID), and patchy infiltrate on CT chest with elevated cr to 1.9  today he is improved with hydration and abx, back to baseline    on exam he is [place] but otherwise is neurologically intact, and walks on own    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 which is causing symptoms in lower cranial nerves, but recent decline may more likely be due to viral infection and GRAEME    We discussed the relative merits of WBRT versus base of skull RT with the hopes of treating symptomatic disease while limiting toxicity  We also discussed the need for spine imaging, and I did address his overall prognosis in the setting of SCLC with lepto  nonetheless he remains minimally symptomatic and prognosis may be better than initially thought on admission    Plan for inpatient RT to begin today  decadron 6 mg daily, taper to 4 daily on dc  stop cellcept   supportive care  MRI T and L spine with gado while inpatient  if he is feeling well enough and imaging has been obtained may be reasonable to d/c mid RT to complete as outpatient late next week

## 2024-01-11 NOTE — H&P ADULT - NSHPPHYSICALEXAM_GEN_ALL_CORE
Vital Signs Last 24 Hrs  T(C): 36.5 (11 Jan 2024 17:05), Max: 36.8 (11 Jan 2024 10:16)  T(F): 97.7 (11 Jan 2024 17:05), Max: 98.3 (11 Jan 2024 10:16)  HR: 87 (11 Jan 2024 17:05) (87 - 98)  BP: 152/60 (11 Jan 2024 17:05) (125/65 - 152/60)  BP(mean): --  RR: 18 (11 Jan 2024 17:05) (16 - 18)  SpO2: 96% (11 Jan 2024 17:05) (96% - 99%)    Parameters below as of 11 Jan 2024 10:16  Patient On (Oxygen Delivery Method): room air    GENERAL: NAD, well-developed  HEENT:  Atraumatic, Normocephalic, EOMI, PERRLA, conjunctiva and sclera clear, oral mucosa moist, clear w/o any exudate   NECK: Supple, No JVD  CHEST/LUNG: bibasilar crackles,  No wheeze  HEART: Regular rate and rhythm; No murmurs, rubs, or gallops  ABDOMEN: Soft, Nontender, Nondistended; Bowel sounds present  EXTREMITIES:  2+ Peripheral Pulses, No clubbing, cyanosis, or edema  PSYCH: AAOx3, normal affect  NEUROLOGY: strength 5/5 in UE and LE, sensation grossly intact   SKIN: No rashes or lesions

## 2024-01-11 NOTE — ED PROVIDER NOTE - PHYSICAL EXAMINATION
GEN: NAD. A&Ox3. Non-toxic appearing.  HEENT: normocephalic, atraumatic, EOMI, PERRL, no scleral icterus, no conjuntival pallor, moist MM  CARDIAC: RRR, S1, S2, no murmur. Radial pulses  present and symmetric b/l  PULM: rhonchi at b/l bases  ABD: soft NT, ND, no rebound no guarding  MSK: Moving all extremities, no edema. 5/5 strength and full ROM in all extremities.     NEURO: no focal neurological deficits, CN 2-12 grossly intact  SKIN: warm, dry, no rash.

## 2024-01-11 NOTE — ED PROVIDER NOTE - CLINICAL SUMMARY MEDICAL DECISION MAKING FREE TEXT BOX
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  Patient endorses cough, decreased p.o. intake, nasal congestion during this time.  Denies fevers, headaches, vision changes, neck pain, chest pain, shortness of breath, abdominal pain, dysuria, hematuria.  Afebrile, not tachycardic, rhonchi at bilateral bases, abdomen soft nontender, no lower extremity swelling or edema.  Per review of HIE, patient seen by neuro and requesting admission for MRI and brain radiation.  Will obtain labs, CT head, discuss with neuro, to be admitted. 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  Patient endorses cough, decreased p.o. intake, nasal congestion during this time.  Denies fevers, headaches, vision changes, neck pain, chest pain, shortness of breath, abdominal pain, dysuria, hematuria.  Afebrile, not tachycardic, rhonchi at bilateral bases, abdomen soft nontender, no lower extremity swelling or edema.  Per review of HIE, patient seen by neuro and requesting admission for MRI and brain radiation.  Will obtain labs, CT head, discuss with neuro, to be admitted.

## 2024-01-12 NOTE — PROGRESS NOTE ADULT - PROBLEM SELECTOR PLAN 3
Patient with LE weakness post LP   -However, on exam, 5/5 strength in LE  -CT head showed Small focus of encephalomalacia/gliotic change in the left postcentral gyrus. MR head showed possible leptomeningeal metastasis, confirmed by recent LP, with positive cytology  -Neurology consulted, recs appreciated   -F/u with MR lumbar and thoracic spine   -C/w decadron 6 mg daily   -PT eval   -Fall precautions

## 2024-01-12 NOTE — CONSULT NOTE ADULT - SUBJECTIVE AND OBJECTIVE BOX
01-12-24 @ 13:11    Patient is a 78y old  Male who presents with a chief complaint of Weakness, cough (12 Jan 2024 10:36)      HPI:  79 yo M with Pmhx metastatic lung cancer, HLD, CAD, PAD, gout, and HTN presents to the ED with weakness and cough, Pt reports that he has been feeling weak for the last 1 week. He has been mostly in his bed and felt dizzy whenever he tried to move or stand. He also had a productive cough and CHOI. Denies any recent fever, chills, nausea, vomiting, abdominal pain or diarrhea. 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 Because of this new finding and worsening symptoms, his oncologist recommended him to come to the ED.   In the ED, his vitals were notable for HTN. Labs were notable for chronic anemia, GRAEME on CKD. CT head showed no acute mass or bleeding.   (11 Jan 2024 18:45)      ?FOLLOWING PRESENT  [ ] Hx of PE/DVT, [ ] Hx COPD, [ ] Hx of Asthma, [ ] Hx of Hospitalization, [ ]  Hx of BiPAP/CPAP use, [ ] Hx of MELISSA    Allergies    No Known Allergies    Intolerances        PAST MEDICAL & SURGICAL HISTORY:  HTN (hypertension)      Hepatitis C  chronic      Gout  in past      PAD (peripheral artery disease)  stent times 2; left  and right lower extremitiy , eliquis stopped 1/11/22 as per Dr Peterson      Former smoker      CAD (coronary artery disease)  on plavix stopped 1/9/22 , cardiac eval prior to OR      H/O carotid stenosis      COVID-19  3/20      Superficial thrombophlebitis of right leg  1/3/22 right gastronemius vein - started on eliquis to stop 1/11/22      S/P cataract surgery  right 2019      Status post peripheral artery angioplasty with insertion of stent  left and right  2019      H/O carotid stenosis  right 2020 - endarterectomy          FAMILY HISTORY:      Social History: [  ] TOBACCO                  [  ] ETOH                                 [  ] IVDA/DRUGS    REVIEW OF SYSTEMS      General:	    Skin/Breast:  	  Ophthalmologic:  	  ENMT:	    Respiratory and Thorax:  	  Cardiovascular:	    Gastrointestinal:	    Genitourinary:	    Musculoskeletal:	    Neurological:	    Psychiatric:	    Hematology/Lymphatics:	    Endocrine:	    Allergic/Immunologic:	    MEDICATIONS  (STANDING):  allopurinol 300 milliGRAM(s) Oral daily  amLODIPine   Tablet 10 milliGRAM(s) Oral every 24 hours  atorvastatin 20 milliGRAM(s) Oral at bedtime  azithromycin  IVPB 500 milliGRAM(s) IV Intermittent every 24 hours  cefTRIAXone   IVPB 1000 milliGRAM(s) IV Intermittent every 24 hours  chlorhexidine 2% Cloths 1 Application(s) Topical daily  clopidogrel Tablet 75 milliGRAM(s) Oral daily  dexAMETHasone     Tablet 6 milliGRAM(s) Oral daily  finasteride 5 milliGRAM(s) Oral daily  pantoprazole  Injectable 40 milliGRAM(s) IV Push daily    MEDICATIONS  (PRN):  acetaminophen     Tablet .. 650 milliGRAM(s) Oral every 6 hours PRN Temp greater or equal to 38C (100.4F), Mild Pain (1 - 3)  aluminum hydroxide/magnesium hydroxide/simethicone Suspension 30 milliLiter(s) Oral every 4 hours PRN Dyspepsia  melatonin 3 milliGRAM(s) Oral at bedtime PRN Insomnia  ondansetron Injectable 4 milliGRAM(s) IV Push every 8 hours PRN Nausea and/or Vomiting       Vital Signs Last 24 Hrs  T(C): 36.6 (12 Jan 2024 05:26), Max: 36.9 (11 Jan 2024 21:33)  T(F): 97.9 (12 Jan 2024 05:26), Max: 98.5 (11 Jan 2024 21:33)  HR: 93 (12 Jan 2024 05:26) (87 - 95)  BP: 155/76 (12 Jan 2024 05:26) (150/59 - 155/76)  BP(mean): --  RR: 18 (12 Jan 2024 05:26) (15 - 18)  SpO2: 95% (12 Jan 2024 05:26) (94% - 96%)    Parameters below as of 12 Jan 2024 05:26  Patient On (Oxygen Delivery Method): room air    Orthostatic VS          I&O's Summary      Physical Exam:   GENERAL: NAD, well-groomed, well-developed  HEENT: PORFIRIO/   Atraumatic, Normocephalic  ENMT: No tonsillar erythema, exudates, or enlargement; Moist mucous membranes, Good dentition, No lesions  NECK: Supple, No JVD, Normal thyroid  CHEST/LUNG: Clear to auscultation bilaterally; No rales, rhonchi, wheezing, or rubs  CVS: Regular rate and rhythm; No murmurs, rubs, or gallops  GI: : Soft, Nontender, Nondistended; Bowel sounds present  NERVOUS SYSTEM:  Alert & Oriented X3, Good concentration; Motor Strength 5/5 B/L upper and lower extremities; DTRs 2+ intact and symmetric  EXTREMITIES:  2+ Peripheral Pulses, No clubbing, cyanosis, or edema  LYMPH: No lymphadenopathy noted  SKIN: No rashes or lesions  ENDOCRINOLOGY: No Thyromegaly  PSYCH: Appropriate    Labs:  SARS-CoV-2: NotDetec (11 Jan 2024 20:33)                              10.2   8.34  )-----------( 228      ( 12 Jan 2024 06:00 )             32.3                         10.5   8.38  )-----------( 234      ( 11 Jan 2024 12:21 )             33.0     01-12    139  |  106  |  24<H>  ----------------------------<  98  4.7   |  21<L>  |  1.59<H>  01-11    140  |  103  |  27<H>  ----------------------------<  105<H>  4.4   |  24  |  1.80<H>    Ca    7.9<L>      12 Jan 2024 06:00  Ca    7.9<L>      11 Jan 2024 12:21  Phos  2.8     01-12  Mg     2.20     01-12    TPro  5.8<L>  /  Alb  2.5<L>  /  TBili  0.2  /  DBili  x   /  AST  24  /  ALT  30  /  AlkPhos  88  01-12  TPro  6.2  /  Alb  2.8<L>  /  TBili  0.3  /  DBili  x   /  AST  21  /  ALT  25  /  AlkPhos  89  01-11    CAPILLARY BLOOD GLUCOSE      POCT Blood Glucose.: 250 mg/dL (12 Jan 2024 12:25)  POCT Blood Glucose.: 97 mg/dL (11 Jan 2024 22:28)    LIVER FUNCTIONS - ( 12 Jan 2024 06:00 )  Alb: 2.5 g/dL / Pro: 5.8 g/dL / ALK PHOS: 88 U/L / ALT: 30 U/L / AST: 24 U/L / GGT: x           PT/INR - ( 11 Jan 2024 12:21 )   PT: 12.5 sec;   INR: 1.11 ratio         PTT - ( 11 Jan 2024 12:21 )  PTT:27.1 sec  Urinalysis Basic - ( 12 Jan 2024 06:00 )    Color: x / Appearance: x / SG: x / pH: x  Gluc: 98 mg/dL / Ketone: x  / Bili: x / Urobili: x   Blood: x / Protein: x / Nitrite: x   Leuk Esterase: x / RBC: x / WBC x   Sq Epi: x / Non Sq Epi: x / Bacteria: x      D DImer      Studies  Chest X-RAY  CT SCAN Chest   CT Abdomen  Venous Dopplers: LE:   Others    < from: Transthoracic Echocardiogram (01.13.22 @ 14:51) >  s not well visualized; grossly normal right  ventricular systolic function. Tricuspid valve not well  visualized, probably normal. Minimal tricuspid  regurgitation. Normal pulmonic valve. Mild pulmonic  regurgitation.  Pericardium/Pleura: Normal pericardium with no pericardial  effusion.  Bilateral pleural effusions.  ------------------------------------------------------------------------  Conclusions:  1. Hyperdynamic left ventricular systolic function.  2. The right ventricle is not well visualized; grossly  normal right ventricular systolic function.  3. Bilateral pleural effusions.  *** No previous Echo exam.  ------------------------------------------------------------------------  Confirmed on  1/13/2022 - 17:13:14 by JRERY Tate  ------------------------------------------------------------------------    < end of copied text >  < from: CT Chest No Cont (01.11.24 @ 19:32) >  ******PRELIMINARY REPORT******      ******PRELIMINARY REPORT******           INTERPRETATION:  Multifocal patchy areas of groundglass and consolidation   most prominent in the dependent portions of the right lung superimposed   on a background of bibasilar reticular change, new from prior and   suspicious for pneumonia, however neoplasm cannot be entirely excluded.    Recommend correlation with suspicion for infection.    Nonemergent findings in the abdomen.    Follow-up final report.    < end of copied text >                   01-12-24 @ 13:11    Patient is a 78y old  Male who presents with a chief complaint of Weakness, cough (12 Jan 2024 10:36)      HPI:  77 yo M with Pmhx metastatic lung cancer, HLD, CAD, PAD, gout, and HTN presents to the ED with weakness and cough, Pt reports that he has been feeling weak for the last 1 week. He has been mostly in his bed and felt dizzy whenever he tried to move or stand. He also had a productive cough and CHOI. Denies any recent fever, chills, nausea, vomiting, abdominal pain or diarrhea. 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 Because of this new finding and worsening symptoms, his oncologist recommended him to come to the ED.   In the ED, his vitals were notable for HTN. Labs were notable for chronic anemia, GRAEME on CKD. CT head showed no acute mass or bleeding.   (11 Jan 2024 18:45)      ?FOLLOWING PRESENT  [ ] Hx of PE/DVT, [ ] Hx COPD, [ ] Hx of Asthma, [ ] Hx of Hospitalization, [ ]  Hx of BiPAP/CPAP use, [ ] Hx of MELISSA    Allergies    No Known Allergies    Intolerances        PAST MEDICAL & SURGICAL HISTORY:  HTN (hypertension)      Hepatitis C  chronic      Gout  in past      PAD (peripheral artery disease)  stent times 2; left  and right lower extremitiy , eliquis stopped 1/11/22 as per Dr Peterson      Former smoker      CAD (coronary artery disease)  on plavix stopped 1/9/22 , cardiac eval prior to OR      H/O carotid stenosis      COVID-19  3/20      Superficial thrombophlebitis of right leg  1/3/22 right gastronemius vein - started on eliquis to stop 1/11/22      S/P cataract surgery  right 2019      Status post peripheral artery angioplasty with insertion of stent  left and right  2019      H/O carotid stenosis  right 2020 - endarterectomy          FAMILY HISTORY:      Social History: [  ] TOBACCO                  [  ] ETOH                                 [  ] IVDA/DRUGS    REVIEW OF SYSTEMS      General:	    Skin/Breast:  	  Ophthalmologic:  	  ENMT:	    Respiratory and Thorax:  	  Cardiovascular:	    Gastrointestinal:	    Genitourinary:	    Musculoskeletal:	    Neurological:	    Psychiatric:	    Hematology/Lymphatics:	    Endocrine:	    Allergic/Immunologic:	    MEDICATIONS  (STANDING):  allopurinol 300 milliGRAM(s) Oral daily  amLODIPine   Tablet 10 milliGRAM(s) Oral every 24 hours  atorvastatin 20 milliGRAM(s) Oral at bedtime  azithromycin  IVPB 500 milliGRAM(s) IV Intermittent every 24 hours  cefTRIAXone   IVPB 1000 milliGRAM(s) IV Intermittent every 24 hours  chlorhexidine 2% Cloths 1 Application(s) Topical daily  clopidogrel Tablet 75 milliGRAM(s) Oral daily  dexAMETHasone     Tablet 6 milliGRAM(s) Oral daily  finasteride 5 milliGRAM(s) Oral daily  pantoprazole  Injectable 40 milliGRAM(s) IV Push daily    MEDICATIONS  (PRN):  acetaminophen     Tablet .. 650 milliGRAM(s) Oral every 6 hours PRN Temp greater or equal to 38C (100.4F), Mild Pain (1 - 3)  aluminum hydroxide/magnesium hydroxide/simethicone Suspension 30 milliLiter(s) Oral every 4 hours PRN Dyspepsia  melatonin 3 milliGRAM(s) Oral at bedtime PRN Insomnia  ondansetron Injectable 4 milliGRAM(s) IV Push every 8 hours PRN Nausea and/or Vomiting       Vital Signs Last 24 Hrs  T(C): 36.6 (12 Jan 2024 05:26), Max: 36.9 (11 Jan 2024 21:33)  T(F): 97.9 (12 Jan 2024 05:26), Max: 98.5 (11 Jan 2024 21:33)  HR: 93 (12 Jan 2024 05:26) (87 - 95)  BP: 155/76 (12 Jan 2024 05:26) (150/59 - 155/76)  BP(mean): --  RR: 18 (12 Jan 2024 05:26) (15 - 18)  SpO2: 95% (12 Jan 2024 05:26) (94% - 96%)    Parameters below as of 12 Jan 2024 05:26  Patient On (Oxygen Delivery Method): room air    Orthostatic VS          I&O's Summary      Physical Exam:   GENERAL: NAD, well-groomed, well-developed  HEENT: PORFIRIO/   Atraumatic, Normocephalic  ENMT: No tonsillar erythema, exudates, or enlargement; Moist mucous membranes, Good dentition, No lesions  NECK: Supple, No JVD, Normal thyroid  CHEST/LUNG: Clear to auscultation bilaterally; No rales, rhonchi, wheezing, or rubs  CVS: Regular rate and rhythm; No murmurs, rubs, or gallops  GI: : Soft, Nontender, Nondistended; Bowel sounds present  NERVOUS SYSTEM:  Alert & Oriented X3, Good concentration; Motor Strength 5/5 B/L upper and lower extremities; DTRs 2+ intact and symmetric  EXTREMITIES:  2+ Peripheral Pulses, No clubbing, cyanosis, or edema  LYMPH: No lymphadenopathy noted  SKIN: No rashes or lesions  ENDOCRINOLOGY: No Thyromegaly  PSYCH: Appropriate    Labs:  SARS-CoV-2: NotDetec (11 Jan 2024 20:33)                              10.2   8.34  )-----------( 228      ( 12 Jan 2024 06:00 )             32.3                         10.5   8.38  )-----------( 234      ( 11 Jan 2024 12:21 )             33.0     01-12    139  |  106  |  24<H>  ----------------------------<  98  4.7   |  21<L>  |  1.59<H>  01-11    140  |  103  |  27<H>  ----------------------------<  105<H>  4.4   |  24  |  1.80<H>    Ca    7.9<L>      12 Jan 2024 06:00  Ca    7.9<L>      11 Jan 2024 12:21  Phos  2.8     01-12  Mg     2.20     01-12    TPro  5.8<L>  /  Alb  2.5<L>  /  TBili  0.2  /  DBili  x   /  AST  24  /  ALT  30  /  AlkPhos  88  01-12  TPro  6.2  /  Alb  2.8<L>  /  TBili  0.3  /  DBili  x   /  AST  21  /  ALT  25  /  AlkPhos  89  01-11    CAPILLARY BLOOD GLUCOSE      POCT Blood Glucose.: 250 mg/dL (12 Jan 2024 12:25)  POCT Blood Glucose.: 97 mg/dL (11 Jan 2024 22:28)    LIVER FUNCTIONS - ( 12 Jan 2024 06:00 )  Alb: 2.5 g/dL / Pro: 5.8 g/dL / ALK PHOS: 88 U/L / ALT: 30 U/L / AST: 24 U/L / GGT: x           PT/INR - ( 11 Jan 2024 12:21 )   PT: 12.5 sec;   INR: 1.11 ratio         PTT - ( 11 Jan 2024 12:21 )  PTT:27.1 sec  Urinalysis Basic - ( 12 Jan 2024 06:00 )    Color: x / Appearance: x / SG: x / pH: x  Gluc: 98 mg/dL / Ketone: x  / Bili: x / Urobili: x   Blood: x / Protein: x / Nitrite: x   Leuk Esterase: x / RBC: x / WBC x   Sq Epi: x / Non Sq Epi: x / Bacteria: x      D DImer      Studies  Chest X-RAY  CT SCAN Chest   CT Abdomen  Venous Dopplers: LE:   Others    < from: Transthoracic Echocardiogram (01.13.22 @ 14:51) >  s not well visualized; grossly normal right  ventricular systolic function. Tricuspid valve not well  visualized, probably normal. Minimal tricuspid  regurgitation. Normal pulmonic valve. Mild pulmonic  regurgitation.  Pericardium/Pleura: Normal pericardium with no pericardial  effusion.  Bilateral pleural effusions.  ------------------------------------------------------------------------  Conclusions:  1. Hyperdynamic left ventricular systolic function.  2. The right ventricle is not well visualized; grossly  normal right ventricular systolic function.  3. Bilateral pleural effusions.  *** No previous Echo exam.  ------------------------------------------------------------------------  Confirmed on  1/13/2022 - 17:13:14 by JERRY Tate  ------------------------------------------------------------------------    < end of copied text >  < from: CT Chest No Cont (01.11.24 @ 19:32) >  ******PRELIMINARY REPORT******      ******PRELIMINARY REPORT******           INTERPRETATION:  Multifocal patchy areas of groundglass and consolidation   most prominent in the dependent portions of the right lung superimposed   on a background of bibasilar reticular change, new from prior and   suspicious for pneumonia, however neoplasm cannot be entirely excluded.    Recommend correlation with suspicion for infection.    Nonemergent findings in the abdomen.    Follow-up final report.    < end of copied text >                   01-12-24 @ 13:11    Patient is a 78y old  Male who presents with a chief complaint of Weakness, cough (12 Jan 2024 10:36)      HPI:  77 yo M with Pmhx metastatic lung cancer, HLD, CAD, PAD, gout, and HTN presents to the ED with weakness and cough, Pt reports that he has been feeling weak for the last 1 week. He has been mostly in his bed and felt dizzy whenever he tried to move or stand. He also had a productive cough and CHOI. Denies any recent fever, chills, nausea, vomiting, abdominal pain or diarrhea. 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 Because of this new finding and worsening symptoms, his oncologist recommended him to come to the ED.   In the ED, his vitals were notable for HTN. Labs were notable for chronic anemia, GRAEME on CKD. CT head showed no acute mass or bleeding.   (11 Jan 2024 18:45)   above confirmed:  he came to hospital and was found to have non covid coronavirus infection:  currently he is not wheezing  He is on room air 99% sao2:     ?FOLLOWING PRESENT  [ x] Hx of PE/DVT, [ x] Hx COPD, x ] Hx of Asthma, [y ] Hx of Hospitalization, [ x]  Hx of BiPAP/CPAP use, [ x] Hx of MELISSA    Allergies    No Known Allergies    Intolerances        PAST MEDICAL & SURGICAL HISTORY:  HTN (hypertension)      Hepatitis C  chronic      Gout  in past      PAD (peripheral artery disease)  stent times 2; left  and right lower extremitiy , eliquis stopped 1/11/22 as per Dr Peterson      Former smoker      CAD (coronary artery disease)  on plavix stopped 1/9/22 , cardiac eval prior to OR      H/O carotid stenosis      COVID-19  3/20      Superficial thrombophlebitis of right leg  1/3/22 right gastronemius vein - started on eliquis to stop 1/11/22      S/P cataract surgery  right 2019      Status post peripheral artery angioplasty with insertion of stent  left and right  2019      H/O carotid stenosis  right 2020 - endarterectomy          FAMILY HISTORY:      Social History: [ former smoker:  30 yrars ] TOBACCO                  [ x ] ETOH                                 [x  ] IVDA/DRUGS    REVIEW OF SYSTEMS      General:	weakness    Skin/Breast:x  	  Ophthalmologic:x  	  ENMT:	x    Respiratory and Thorax:  cough   	  Cardiovascular:	x    Gastrointestinal:	x    Genitourinary:	x    Musculoskeletal:	x    Neurological:	  x  Psychiatric:	x    Hematology/Lymphatics:	x    Endocrine:	x    Allergic/Immunologic:	x    MEDICATIONS  (STANDING):  allopurinol 300 milliGRAM(s) Oral daily  amLODIPine   Tablet 10 milliGRAM(s) Oral every 24 hours  atorvastatin 20 milliGRAM(s) Oral at bedtime  azithromycin  IVPB 500 milliGRAM(s) IV Intermittent every 24 hours  cefTRIAXone   IVPB 1000 milliGRAM(s) IV Intermittent every 24 hours  chlorhexidine 2% Cloths 1 Application(s) Topical daily  clopidogrel Tablet 75 milliGRAM(s) Oral daily  dexAMETHasone     Tablet 6 milliGRAM(s) Oral daily  finasteride 5 milliGRAM(s) Oral daily  pantoprazole  Injectable 40 milliGRAM(s) IV Push daily    MEDICATIONS  (PRN):  acetaminophen     Tablet .. 650 milliGRAM(s) Oral every 6 hours PRN Temp greater or equal to 38C (100.4F), Mild Pain (1 - 3)  aluminum hydroxide/magnesium hydroxide/simethicone Suspension 30 milliLiter(s) Oral every 4 hours PRN Dyspepsia  melatonin 3 milliGRAM(s) Oral at bedtime PRN Insomnia  ondansetron Injectable 4 milliGRAM(s) IV Push every 8 hours PRN Nausea and/or Vomiting       Vital Signs Last 24 Hrs  T(C): 36.6 (12 Jan 2024 05:26), Max: 36.9 (11 Jan 2024 21:33)  T(F): 97.9 (12 Jan 2024 05:26), Max: 98.5 (11 Jan 2024 21:33)  HR: 93 (12 Jan 2024 05:26) (87 - 95)  BP: 155/76 (12 Jan 2024 05:26) (150/59 - 155/76)  BP(mean): --  RR: 18 (12 Jan 2024 05:26) (15 - 18)  SpO2: 95% (12 Jan 2024 05:26) (94% - 96%)    Parameters below as of 12 Jan 2024 05:26  Patient On (Oxygen Delivery Method): room air    Orthostatic VS          I&O's Summary      Physical Exam:   GENERAL: NAD, well-groomed, well-developed  HEENT: PORFIRIO/   Atraumatic, Normocephalic  ENMT: No tonsillar erythema, exudates, or enlargement; Moist mucous membranes, Good dentition, No lesions  NECK: Supple, No JVD, Normal thyroid  CHEST/LUNG: Clear to auscultation bilaterally- no wheezing  CVS: Regular rate and rhythm; No murmurs, rubs, or gallops  GI: : Soft, Nontender, Nondistended; Bowel sounds present  NERVOUS SYSTEM:  Alert & Oriented X3  EXTREMITIES:  2+ Peripheral Pulses, No clubbing, cyanosis, or edema  LYMPH: No lymphadenopathy noted  SKIN: No rashes or lesions  ENDOCRINOLOGY: No Thyromegaly  PSYCH: Appropriate    Labs:  SARS-CoV-2: NotDetec (11 Jan 2024 20:33)                              10.2   8.34  )-----------( 228      ( 12 Jan 2024 06:00 )             32.3                         10.5   8.38  )-----------( 234      ( 11 Jan 2024 12:21 )             33.0     01-12    139  |  106  |  24<H>  ----------------------------<  98  4.7   |  21<L>  |  1.59<H>  01-11    140  |  103  |  27<H>  ----------------------------<  105<H>  4.4   |  24  |  1.80<H>    Ca    7.9<L>      12 Jan 2024 06:00  Ca    7.9<L>      11 Jan 2024 12:21  Phos  2.8     01-12  Mg     2.20     01-12    TPro  5.8<L>  /  Alb  2.5<L>  /  TBili  0.2  /  DBili  x   /  AST  24  /  ALT  30  /  AlkPhos  88  01-12  TPro  6.2  /  Alb  2.8<L>  /  TBili  0.3  /  DBili  x   /  AST  21  /  ALT  25  /  AlkPhos  89  01-11    CAPILLARY BLOOD GLUCOSE      POCT Blood Glucose.: 250 mg/dL (12 Jan 2024 12:25)  POCT Blood Glucose.: 97 mg/dL (11 Jan 2024 22:28)    LIVER FUNCTIONS - ( 12 Jan 2024 06:00 )  Alb: 2.5 g/dL / Pro: 5.8 g/dL / ALK PHOS: 88 U/L / ALT: 30 U/L / AST: 24 U/L / GGT: x           PT/INR - ( 11 Jan 2024 12:21 )   PT: 12.5 sec;   INR: 1.11 ratio         PTT - ( 11 Jan 2024 12:21 )  PTT:27.1 sec  Urinalysis Basic - ( 12 Jan 2024 06:00 )    Color: x / Appearance: x / SG: x / pH: x  Gluc: 98 mg/dL / Ketone: x  / Bili: x / Urobili: x   Blood: x / Protein: x / Nitrite: x   Leuk Esterase: x / RBC: x / WBC x   Sq Epi: x / Non Sq Epi: x / Bacteria: x      D DImer      Studies  Chest X-RAY  CT SCAN Chest   CT Abdomen  Venous Dopplers: LE:   Others    < from: Transthoracic Echocardiogram (01.13.22 @ 14:51) >  s not well visualized; grossly normal right  ventricular systolic function. Tricuspid valve not well  visualized, probably normal. Minimal tricuspid  regurgitation. Normal pulmonic valve. Mild pulmonic  regurgitation.  Pericardium/Pleura: Normal pericardium with no pericardial  effusion.  Bilateral pleural effusions.  ------------------------------------------------------------------------  Conclusions:  1. Hyperdynamic left ventricular systolic function.  2. The right ventricle is not well visualized; grossly  normal right ventricular systolic function.  3. Bilateral pleural effusions.  *** No previous Echo exam.  ------------------------------------------------------------------------  Confirmed on  1/13/2022 - 17:13:14 by JERRY Tate  ------------------------------------------------------------------------    < end of copied text >  < from: CT Chest No Cont (01.11.24 @ 19:32) >  ******PRELIMINARY REPORT******      ******PRELIMINARY REPORT******           INTERPRETATION:  Multifocal patchy areas of groundglass and consolidation   most prominent in the dependent portions of the right lung superimposed   on a background of bibasilar reticular change, new from prior and   suspicious for pneumonia, however neoplasm cannot be entirely excluded.    Recommend correlation with suspicion for infection.    Nonemergent findings in the abdomen.    Follow-up final report.    < end of copied text >    rad< from: CT Chest No Cont (01.11.24 @ 19:32) >  New confluent opacities in the right lung can represent pneumonia.  No evidence of disease in the abdomen and pelvis.    < end of copied text >                 01-12-24 @ 13:11    Patient is a 78y old  Male who presents with a chief complaint of Weakness, cough (12 Jan 2024 10:36)      HPI:  79 yo M with Pmhx metastatic lung cancer, HLD, CAD, PAD, gout, and HTN presents to the ED with weakness and cough, Pt reports that he has been feeling weak for the last 1 week. He has been mostly in his bed and felt dizzy whenever he tried to move or stand. He also had a productive cough and CHOI. Denies any recent fever, chills, nausea, vomiting, abdominal pain or diarrhea. 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 Because of this new finding and worsening symptoms, his oncologist recommended him to come to the ED.   In the ED, his vitals were notable for HTN. Labs were notable for chronic anemia, GRAEME on CKD. CT head showed no acute mass or bleeding.   (11 Jan 2024 18:45)   above confirmed:  he came to hospital and was found to have non covid coronavirus infection:  currently he is not wheezing  He is on room air 99% sao2:     ?FOLLOWING PRESENT  [ x] Hx of PE/DVT, [ x] Hx COPD, x ] Hx of Asthma, [y ] Hx of Hospitalization, [ x]  Hx of BiPAP/CPAP use, [ x] Hx of MELISSA    Allergies    No Known Allergies    Intolerances        PAST MEDICAL & SURGICAL HISTORY:  HTN (hypertension)      Hepatitis C  chronic      Gout  in past      PAD (peripheral artery disease)  stent times 2; left  and right lower extremitiy , eliquis stopped 1/11/22 as per Dr Peterson      Former smoker      CAD (coronary artery disease)  on plavix stopped 1/9/22 , cardiac eval prior to OR      H/O carotid stenosis      COVID-19  3/20      Superficial thrombophlebitis of right leg  1/3/22 right gastronemius vein - started on eliquis to stop 1/11/22      S/P cataract surgery  right 2019      Status post peripheral artery angioplasty with insertion of stent  left and right  2019      H/O carotid stenosis  right 2020 - endarterectomy          FAMILY HISTORY:      Social History: [ former smoker:  30 yrars ] TOBACCO                  [ x ] ETOH                                 [x  ] IVDA/DRUGS    REVIEW OF SYSTEMS      General:	weakness    Skin/Breast:x  	  Ophthalmologic:x  	  ENMT:	x    Respiratory and Thorax:  cough   	  Cardiovascular:	x    Gastrointestinal:	x    Genitourinary:	x    Musculoskeletal:	x    Neurological:	  x  Psychiatric:	x    Hematology/Lymphatics:	x    Endocrine:	x    Allergic/Immunologic:	x    MEDICATIONS  (STANDING):  allopurinol 300 milliGRAM(s) Oral daily  amLODIPine   Tablet 10 milliGRAM(s) Oral every 24 hours  atorvastatin 20 milliGRAM(s) Oral at bedtime  azithromycin  IVPB 500 milliGRAM(s) IV Intermittent every 24 hours  cefTRIAXone   IVPB 1000 milliGRAM(s) IV Intermittent every 24 hours  chlorhexidine 2% Cloths 1 Application(s) Topical daily  clopidogrel Tablet 75 milliGRAM(s) Oral daily  dexAMETHasone     Tablet 6 milliGRAM(s) Oral daily  finasteride 5 milliGRAM(s) Oral daily  pantoprazole  Injectable 40 milliGRAM(s) IV Push daily    MEDICATIONS  (PRN):  acetaminophen     Tablet .. 650 milliGRAM(s) Oral every 6 hours PRN Temp greater or equal to 38C (100.4F), Mild Pain (1 - 3)  aluminum hydroxide/magnesium hydroxide/simethicone Suspension 30 milliLiter(s) Oral every 4 hours PRN Dyspepsia  melatonin 3 milliGRAM(s) Oral at bedtime PRN Insomnia  ondansetron Injectable 4 milliGRAM(s) IV Push every 8 hours PRN Nausea and/or Vomiting       Vital Signs Last 24 Hrs  T(C): 36.6 (12 Jan 2024 05:26), Max: 36.9 (11 Jan 2024 21:33)  T(F): 97.9 (12 Jan 2024 05:26), Max: 98.5 (11 Jan 2024 21:33)  HR: 93 (12 Jan 2024 05:26) (87 - 95)  BP: 155/76 (12 Jan 2024 05:26) (150/59 - 155/76)  BP(mean): --  RR: 18 (12 Jan 2024 05:26) (15 - 18)  SpO2: 95% (12 Jan 2024 05:26) (94% - 96%)    Parameters below as of 12 Jan 2024 05:26  Patient On (Oxygen Delivery Method): room air    Orthostatic VS          I&O's Summary      Physical Exam:   GENERAL: NAD, well-groomed, well-developed  HEENT: PORFIRIO/   Atraumatic, Normocephalic  ENMT: No tonsillar erythema, exudates, or enlargement; Moist mucous membranes, Good dentition, No lesions  NECK: Supple, No JVD, Normal thyroid  CHEST/LUNG: Clear to auscultation bilaterally- no wheezing  CVS: Regular rate and rhythm; No murmurs, rubs, or gallops  GI: : Soft, Nontender, Nondistended; Bowel sounds present  NERVOUS SYSTEM:  Alert & Oriented X3  EXTREMITIES:  2+ Peripheral Pulses, No clubbing, cyanosis, or edema  LYMPH: No lymphadenopathy noted  SKIN: No rashes or lesions  ENDOCRINOLOGY: No Thyromegaly  PSYCH: Appropriate    Labs:  SARS-CoV-2: NotDetec (11 Jan 2024 20:33)                              10.2   8.34  )-----------( 228      ( 12 Jan 2024 06:00 )             32.3                         10.5   8.38  )-----------( 234      ( 11 Jan 2024 12:21 )             33.0     01-12    139  |  106  |  24<H>  ----------------------------<  98  4.7   |  21<L>  |  1.59<H>  01-11    140  |  103  |  27<H>  ----------------------------<  105<H>  4.4   |  24  |  1.80<H>    Ca    7.9<L>      12 Jan 2024 06:00  Ca    7.9<L>      11 Jan 2024 12:21  Phos  2.8     01-12  Mg     2.20     01-12    TPro  5.8<L>  /  Alb  2.5<L>  /  TBili  0.2  /  DBili  x   /  AST  24  /  ALT  30  /  AlkPhos  88  01-12  TPro  6.2  /  Alb  2.8<L>  /  TBili  0.3  /  DBili  x   /  AST  21  /  ALT  25  /  AlkPhos  89  01-11    CAPILLARY BLOOD GLUCOSE      POCT Blood Glucose.: 250 mg/dL (12 Jan 2024 12:25)  POCT Blood Glucose.: 97 mg/dL (11 Jan 2024 22:28)    LIVER FUNCTIONS - ( 12 Jan 2024 06:00 )  Alb: 2.5 g/dL / Pro: 5.8 g/dL / ALK PHOS: 88 U/L / ALT: 30 U/L / AST: 24 U/L / GGT: x           PT/INR - ( 11 Jan 2024 12:21 )   PT: 12.5 sec;   INR: 1.11 ratio         PTT - ( 11 Jan 2024 12:21 )  PTT:27.1 sec  Urinalysis Basic - ( 12 Jan 2024 06:00 )    Color: x / Appearance: x / SG: x / pH: x  Gluc: 98 mg/dL / Ketone: x  / Bili: x / Urobili: x   Blood: x / Protein: x / Nitrite: x   Leuk Esterase: x / RBC: x / WBC x   Sq Epi: x / Non Sq Epi: x / Bacteria: x      D DImer      Studies  Chest X-RAY  CT SCAN Chest   CT Abdomen  Venous Dopplers: LE:   Others    < from: Transthoracic Echocardiogram (01.13.22 @ 14:51) >  s not well visualized; grossly normal right  ventricular systolic function. Tricuspid valve not well  visualized, probably normal. Minimal tricuspid  regurgitation. Normal pulmonic valve. Mild pulmonic  regurgitation.  Pericardium/Pleura: Normal pericardium with no pericardial  effusion.  Bilateral pleural effusions.  ------------------------------------------------------------------------  Conclusions:  1. Hyperdynamic left ventricular systolic function.  2. The right ventricle is not well visualized; grossly  normal right ventricular systolic function.  3. Bilateral pleural effusions.  *** No previous Echo exam.  ------------------------------------------------------------------------  Confirmed on  1/13/2022 - 17:13:14 by JERRY Tate  ------------------------------------------------------------------------    < end of copied text >  < from: CT Chest No Cont (01.11.24 @ 19:32) >  ******PRELIMINARY REPORT******      ******PRELIMINARY REPORT******           INTERPRETATION:  Multifocal patchy areas of groundglass and consolidation   most prominent in the dependent portions of the right lung superimposed   on a background of bibasilar reticular change, new from prior and   suspicious for pneumonia, however neoplasm cannot be entirely excluded.    Recommend correlation with suspicion for infection.    Nonemergent findings in the abdomen.    Follow-up final report.    < end of copied text >    rad< from: CT Chest No Cont (01.11.24 @ 19:32) >  New confluent opacities in the right lung can represent pneumonia.  No evidence of disease in the abdomen and pelvis.    < end of copied text >

## 2024-01-12 NOTE — PROGRESS NOTE ADULT - NSPROGADDITIONALINFOA_GEN_ALL_CORE
d/w pt, the wife and the daughter.  d/w onc Dr. Mejia.  d/w NP Rashida.    - Dr. SOLITARIO Lawson ([facility])  - [phone] d/w pt, the wife and the daughter.  d/w onc Dr. Mejia.  d/w NP Rashida.    - Dr. SOLITARIO Lawson ([facility])  - [phone]

## 2024-01-12 NOTE — PROGRESS NOTE ADULT - SUBJECTIVE AND OBJECTIVE BOX
SUBJECTIVE/ OVERNIGHT EVENTS:  Pt seen and examined earlier today.   He is awake alert  mental status much improved  the wife and the daughter at bedside.   denied pain.   hard of hearing.   no cp, no sob, no n/v/d.  no HA, no dizziness. no abdominal pain.   minimal cough.     --------------------------------------------------------------------------------------------  LABS:                        10.2   8.34  )-----------( 228      ( 12 Jan 2024 06:00 )             32.3     01-12    139  |  106  |  24<H>  ----------------------------<  98  4.7   |  21<L>  |  1.59<H>    Ca    7.9<L>      12 Jan 2024 06:00  Phos  2.8     01-12  Mg     2.20     01-12    TPro  5.8<L>  /  Alb  2.5<L>  /  TBili  0.2  /  DBili  x   /  AST  24  /  ALT  30  /  AlkPhos  88  01-12    PT/INR - ( 11 Jan 2024 12:21 )   PT: 12.5 sec;   INR: 1.11 ratio         PTT - ( 11 Jan 2024 12:21 )  PTT:27.1 sec  CAPILLARY BLOOD GLUCOSE      POCT Blood Glucose.: 250 mg/dL (12 Jan 2024 12:25)  POCT Blood Glucose.: 97 mg/dL (11 Jan 2024 22:28)        Urinalysis Basic - ( 12 Jan 2024 06:00 )    Color: x / Appearance: x / SG: x / pH: x  Gluc: 98 mg/dL / Ketone: x  / Bili: x / Urobili: x   Blood: x / Protein: x / Nitrite: x   Leuk Esterase: x / RBC: x / WBC x   Sq Epi: x / Non Sq Epi: x / Bacteria: x        RADIOLOGY & ADDITIONAL TESTS:    Imaging Personally Reviewed:  [x] YES  [ ] NO    Consultant(s) Notes Reviewed:  [x] YES  [ ] NO    MEDICATIONS  (STANDING):  allopurinol 300 milliGRAM(s) Oral daily  amLODIPine   Tablet 10 milliGRAM(s) Oral every 24 hours  atorvastatin 20 milliGRAM(s) Oral at bedtime  azithromycin  IVPB 500 milliGRAM(s) IV Intermittent every 24 hours  cefTRIAXone   IVPB 1000 milliGRAM(s) IV Intermittent every 24 hours  chlorhexidine 2% Cloths 1 Application(s) Topical daily  clopidogrel Tablet 75 milliGRAM(s) Oral daily  dexAMETHasone     Tablet 6 milliGRAM(s) Oral daily  finasteride 5 milliGRAM(s) Oral daily  mupirocin 2% Ointment 1 Application(s) Topical three times a day  pantoprazole  Injectable 40 milliGRAM(s) IV Push daily    MEDICATIONS  (PRN):  acetaminophen     Tablet .. 650 milliGRAM(s) Oral every 6 hours PRN Temp greater or equal to 38C (100.4F), Mild Pain (1 - 3)  aluminum hydroxide/magnesium hydroxide/simethicone Suspension 30 milliLiter(s) Oral every 4 hours PRN Dyspepsia  melatonin 3 milliGRAM(s) Oral at bedtime PRN Insomnia  ondansetron Injectable 4 milliGRAM(s) IV Push every 8 hours PRN Nausea and/or Vomiting      Care Discussed with Consultants/Other Providers [x] YES  [ ] NO    Vital Signs Last 24 Hrs  T(C): 37 (12 Jan 2024 21:49), Max: 37 (12 Jan 2024 21:49)  T(F): 98.6 (12 Jan 2024 21:49), Max: 98.6 (12 Jan 2024 21:49)  HR: 83 (12 Jan 2024 21:49) (77 - 93)  BP: 142/61 (12 Jan 2024 21:49) (139/65 - 155/76)  BP(mean): --  RR: 17 (12 Jan 2024 21:49) (16 - 18)  SpO2: 96% (12 Jan 2024 21:49) (94% - 99%)    Parameters below as of 12 Jan 2024 21:49  Patient On (Oxygen Delivery Method): room air      I&O's Summary      PHYSICAL EXAM:  GENERAL: NAD, well-developed, comfortable, hard of hearing  HEAD:  Atraumatic, Normocephalic  EYES: EOMI, PERRLA, conjunctiva and sclera clear  NECK: Supple, No JVD  CHEST/LUNG: mild decrease breath sounds bilaterally; No wheeze   HEART: Regular rate and rhythm; No murmurs, rubs, or gallops  ABDOMEN: Soft, Nontender, Nondistended; Bowel sounds present  Neuro: awake alert, no focal weakness    EXTREMITIES:  2+ Peripheral Pulses, No clubbing, cyanosis, or edema  SKIN: No rashes or lesions

## 2024-01-12 NOTE — PATIENT PROFILE ADULT - FALL HARM RISK - HARM RISK INTERVENTIONS
Assistance with ambulation/Assistance OOB with selected safe patient handling equipment/Communicate Risk of Fall with Harm to all staff/Discuss with provider need for PT consult/Monitor gait and stability/Provide patient with walking aids - walker, cane, crutches/Reinforce activity limits and safety measures with patient and family/Tailored Fall Risk Interventions/Visual Cue: Yellow wristband and red socks/Bed in lowest position, wheels locked, appropriate side rails in place/Call bell, personal items and telephone in reach/Instruct patient to call for assistance before getting out of bed or chair/Non-slip footwear when patient is out of bed/[place] to call system/Physically safe environment - no spills, clutter or unnecessary equipment/Purposeful Proactive Rounding/Room/bathroom lighting operational, light cord in reach Assistance with ambulation/Assistance OOB with selected safe patient handling equipment/Communicate Risk of Fall with Harm to all staff/Discuss with provider need for PT consult/Monitor gait and stability/Provide patient with walking aids - walker, cane, crutches/Reinforce activity limits and safety measures with patient and family/Tailored Fall Risk Interventions/Visual Cue: Yellow wristband and red socks/Bed in lowest position, wheels locked, appropriate side rails in place/Call bell, personal items and telephone in reach/Instruct patient to call for assistance before getting out of bed or chair/Non-slip footwear when patient is out of bed/[place] to call system/Physically safe environment - no spills, clutter or unnecessary equipment/Purposeful Proactive Rounding/Room/bathroom lighting operational, light cord in reach

## 2024-01-12 NOTE — CONSULT NOTE ADULT - ASSESSMENT
79 yo M with Pmhx metastatic lung cancer, HLD, CAD, PAD, gout, and HTN presents to the ED with weakness and cough, Pt reports that he has been feeling weak for the last 1 week. He has been mostly in his bed and felt dizzy whenever he tried to move or stand. He also had a productive cough and CHOI. Denies any recent fever, chills, nausea, vomiting, abdominal pain or diarrhea. 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 Because of this new finding and worsening symptoms, his oncologist recommended him to come to the ED.   In the ED, his vitals were notable for HTN. Labs were notable for chronic anemia, GRAEME on CKD. CT head showed no acute mass or bleeding.   (11 Jan 2024 18:45)   above confirmed:  he came to hospital and was found to have non covid coronavirus infection:  currently he is not wheezing  He is on room air 99% sao2:         Non covid coronavirus infection:   Lung cancer metastatic:   CAD  PAD  HTN    Non covid coronavirus infection:   -supportive care:   -no need for steroids  from pulm stand point  as he is not wheezing  -he is on room air with excellent sao2:   -his ct chest showed:   New confluent opacities in the right lung can represent pneumonia.No evidence of disease in the abdomen and pelvis- cont antibiotics  check legionella   Lung cancer metastatic:   -has brain mets:  on dexa:  seen by rad onc:  has leptomeningeal disease:  defer to rad onc   CAD  -cont home meds   PAD  -per PMD  HTN  -controlled:     dvt prophylaxis    dw team   77 yo M with Pmhx metastatic lung cancer, HLD, CAD, PAD, gout, and HTN presents to the ED with weakness and cough, Pt reports that he has been feeling weak for the last 1 week. He has been mostly in his bed and felt dizzy whenever he tried to move or stand. He also had a productive cough and CHOI. Denies any recent fever, chills, nausea, vomiting, abdominal pain or diarrhea. 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 Because of this new finding and worsening symptoms, his oncologist recommended him to come to the ED.   In the ED, his vitals were notable for HTN. Labs were notable for chronic anemia, GRAEME on CKD. CT head showed no acute mass or bleeding.   (11 Jan 2024 18:45)   above confirmed:  he came to hospital and was found to have non covid coronavirus infection:  currently he is not wheezing  He is on room air 99% sao2:         Non covid coronavirus infection:   Lung cancer metastatic:   CAD  PAD  HTN    Non covid coronavirus infection:   -supportive care:   -no need for steroids  from pulm stand point  as he is not wheezing  -he is on room air with excellent sao2:   -his ct chest showed:   New confluent opacities in the right lung can represent pneumonia.No evidence of disease in the abdomen and pelvis- cont antibiotics  check legionella   Lung cancer metastatic:   -has brain mets:  on dexa:  seen by rad onc:  has leptomeningeal disease:  defer to rad onc   CAD  -cont home meds   PAD  -per PMD  HTN  -controlled:     dvt prophylaxis    dw team

## 2024-01-12 NOTE — PROGRESS NOTE ADULT - ASSESSMENT
77 yo M with PMHx metastatic lung cancer, HLD, CAD, PAD, gout, and HTN presents to the ED with weakness and cough, possibly due to PNA.  79 yo M with PMHx metastatic lung cancer, HLD, CAD, PAD, gout, and HTN presents to the ED with weakness and cough, possibly due to PNA.

## 2024-01-12 NOTE — CONSULT NOTE ADULT - SUBJECTIVE AND OBJECTIVE BOX
HPI:  78 year old history of small cell lung cancer diagnosed in January of 2022 he was treated with carbo etoposide and atezolizumab maintenance with excellent response. He developed the right hand numbness in the setting of a high KPS For which he received 18 gray GKSRS to the left parietal area in may of 2023. 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 He is symptomatic from this with lower extremity weakness although this is improved somewhat. He also states he has 2 mo history of new left sided deafness, imbalance, and vertigo.           KPS: 60-70    Allergies  No Known Allergies    ROS: [  ] Fever  [  ] Chills  [  ]Chest Pain [  ] SOB  [  ]Cough [  ] N/V  [  ] Diarrhea [  ]Constipation [  ]Other ROS:  [  x] ROS otherwise negative    PAST MEDICAL & SURGICAL HISTORY:  HTN (hypertension)    Hepatitis C  chronic    Gout  in past    PAD (peripheral artery disease)  stent times 2; left  and right lower extremitiy , eliquis stopped 1/11/22 as per Dr Peterson    Former smoker    CAD (coronary artery disease)  on plavix stopped 1/9/22 , cardiac eval prior to OR    H/O carotid stenosis    COVID-19  3/20    Superficial thrombophlebitis of right leg  1/3/22 right gastronemius vein - started on eliquis to stop 1/11/22    S/P cataract surgery  right 2019    Status post peripheral artery angioplasty with insertion of stent  left and right  2019    H/O carotid stenosis  right 2020 - endarterectomy        FAMILY HISTORY:    MEDICATIONS  (STANDING):  allopurinol 300 milliGRAM(s) Oral daily  amLODIPine   Tablet 10 milliGRAM(s) Oral every 24 hours  atorvastatin 20 milliGRAM(s) Oral at bedtime  azithromycin  IVPB 500 milliGRAM(s) IV Intermittent every 24 hours  cefTRIAXone   IVPB 1000 milliGRAM(s) IV Intermittent every 24 hours  chlorhexidine 2% Cloths 1 Application(s) Topical daily  clopidogrel Tablet 75 milliGRAM(s) Oral daily  dexAMETHasone     Tablet 6 milliGRAM(s) Oral daily  finasteride 5 milliGRAM(s) Oral daily  pantoprazole  Injectable 40 milliGRAM(s) IV Push daily    MEDICATIONS  (PRN):  acetaminophen     Tablet .. 650 milliGRAM(s) Oral every 6 hours PRN Temp greater or equal to 38C (100.4F), Mild Pain (1 - 3)  aluminum hydroxide/magnesium hydroxide/simethicone Suspension 30 milliLiter(s) Oral every 4 hours PRN Dyspepsia  melatonin 3 milliGRAM(s) Oral at bedtime PRN Insomnia  ondansetron Injectable 4 milliGRAM(s) IV Push every 8 hours PRN Nausea and/or Vomiting      PHYSICAL EXAM  Vital Signs Last 24 Hrs  T(C): 36.6 (12 Jan 2024 05:26), Max: 36.9 (11 Jan 2024 21:33)  T(F): 97.9 (12 Jan 2024 05:26), Max: 98.5 (11 Jan 2024 21:33)  HR: 93 (12 Jan 2024 05:26) (87 - 95)  BP: 155/76 (12 Jan 2024 05:26) (150/59 - 155/76)  BP(mean): --  RR: 18 (12 Jan 2024 05:26) (15 - 18)  SpO2: 95% (12 Jan 2024 05:26) (94% - 96%)    Parameters below as of 12 Jan 2024 05:26  Patient On (Oxygen Delivery Method): room air        General: Well nourished, well developed, no acute distress  HEENT: NC/AT; EOMI, PERRL, sclera nonicteric; external ears normal; no rhinorrhea or epistaxis; mucous membranes moist; oropharynx clear and without erythema  CV: NR, RR; no appreciable r/m/g  Lungs: CTAB, no increased work of breathing  Abdomen: Bowel sounds present; soft, NTND  MSK: Vertebral spine non-tender to palpation  Neuro: AAOx3; cranial nerves II-XII intact; strength 5/5 in upper and lower extremities; sensation to light touch in tact bilaterally.  Psych: Full affect; mood congruent  Skin: no visible rashes on limited examination    IMAGING/LABS/PATHOLOGY: I have personally reviewed the relevant labs, pathology, and imaging as noted in the HPI.  In addition,    ASSESSMENT/PLAN    78 year old man with history of small cell lung cancer diagnosed in January of 2022 he was treated with carbo etoposide and atezolizumab maintenance with excellent response. He developed the right hand numbness in the setting of a high KPS For which he received 18 gray GKSRS to the left parietal area in may of 2023. 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 He is symptomatic from this with lower extremity weakness although this is improved somewhat. He also states he has 2 mo history of new left sided deafness, imbalance, and vertigo.       We discussed the use of whole brain palliative radiation in this setting, namely to improve quality of life through the reduction of symptoms.  We would like to complete as inpatient given it may be dificult for him to complete as outpatient, and plan 5 treatments.  We talked about the risks, benefits, acute and long term side effects, as well as expected treatment outcomes.  He was given the opportunity to ask questions, which were answered to his apparent satisfaction.  He provided written consent to proceed with radiation therapy. We will arrange for inpatient treatment.

## 2024-01-12 NOTE — CHART NOTE - NSCHARTNOTEFT_GEN_A_CORE
NEURO-ONCOLOGY ATTENDING    Seen and examined     patient well known to me, 78m SCLC, with brain met treated with SRS in 5/23 to left frontal lesion with excellent response  systemically treated by Dr. Lin at [facility], with carbo/etop/atezo followed by atezo maintenance  in 11/23 developed vertigo, and bilat CN 7/8 enhancement  he was treated with cellcept and steroids for presumed ICI toxicity, but MRI worsened and was sent for LP last week, which demonstrated 13 TNC, pr 78 and positive cytology for SCLC    He was to be referred to rad onc but declined over a few days, bedridden for 16+ hours a day  no headaches, neck/back pain, n/v, diplopia or confusion  Sent o ER, where found to have viral infection (coronavirus, non COVID), and patchy infiltrate on CT chest with elevated cr to 1.9  today he is improved with hydration and abx, back to baseline    on exam he is [place] but otherwise is neurologically intact, and walks on own    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 which is causing symptoms in lower cranial nerves, but recent decline may more likely be due to viral infection and GRAEME    We discussed the relative merits of WBRT versus base of skull RT with the hopes of treating symptomatic disease while limiting toxicity  We also discussed the need for spine imaging, and I did address his overall prognosis in the setting of SCLC with lepto  nonetheless he remains minimally symptomatic and prognosis may be better than initially thought on admission    Plan for inpatient RT to begin today  decadron 6 mg daily, taper to 4 daily on dc  stop cellcept   supportive care  MRI T and L spine with gado while inpatient  if he is feeling well enough and imaging has been obtained may be reasonable to d/c mid RT to complete as outpatient late next week .  d/w [facility] medicine team and outpatient oncology and radiation oncology here NEURO-ONCOLOGY ATTENDING    Seen and examined     patient well known to me, 78m SCLC, with brain met treated with SRS in 5/23 to left frontal lesion with excellent response  systemically treated by Dr. Lin at [facility], with carbo/etop/atezo followed by atezo maintenance  in 11/23 developed vertigo, and bilat CN 7/8 enhancement  he was treated with cellcept and steroids for presumed ICI toxicity, but MRI worsened and was sent for LP last week, which demonstrated 13 TNC, pr 78 and positive cytology for SCLC    He was to be referred to rad onc but declined over a few days, bedridden for 16+ hours a day  no headaches, neck/back pain, n/v, diplopia or confusion  Sent o ER, where found to have viral infection (coronavirus, non COVID), and patchy infiltrate on CT chest with elevated cr to 1.9  today he is improved with hydration and abx, back to baseline    on exam he is [place] but otherwise is neurologically intact, and walks on own    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 which is causing symptoms in lower cranial nerves, but recent decline may more likely be due to viral infection and GRAEME    We discussed the relative merits of WBRT versus base of skull RT with the hopes of treating symptomatic disease while limiting toxicity  We also discussed the need for spine imaging, and I did address his overall prognosis in the setting of SCLC with lepto  nonetheless he remains minimally symptomatic and prognosis may be better than initially thought on admission    Plan for inpatient RT to begin today  decadron 6 mg daily, taper to 4 daily on dc  stop cellcept   supportive care  MRI T and L spine with gado while inpatient  if he is feeling well enough and imaging has been obtained may be reasonable to d/c mid RT to complete as outpatient late next week .  d/w [facility] medicine team and outpatient oncology and radiation oncology here

## 2024-01-13 NOTE — PROGRESS NOTE ADULT - SUBJECTIVE AND OBJECTIVE BOX
SUBJECTIVE/ OVERNIGHT EVENTS:  Feeling better today.  awake alert  stable Cr.  the wife at bedside.   No overnight event.  No complaints.  Chest pain free. no SOB, no N/V/D.  Denied HA/dizziness, abdominal pain.       --------------------------------------------------------------------------------------------  LABS:                        10.1   10.89 )-----------( 283      ( 13 Jan 2024 06:00 )             31.3     01-13    135  |  103  |  30<H>  ----------------------------<  118<H>  4.3   |  20<L>  |  1.42<H>    Ca    8.0<L>      13 Jan 2024 06:00  Phos  3.0     01-13  Mg     2.30     01-13    TPro  5.8<L>  /  Alb  2.5<L>  /  TBili  0.2  /  DBili  x   /  AST  24  /  ALT  30  /  AlkPhos  88  01-12      CAPILLARY BLOOD GLUCOSE            Urinalysis Basic - ( 13 Jan 2024 06:00 )    Color: x / Appearance: x / SG: x / pH: x  Gluc: 118 mg/dL / Ketone: x  / Bili: x / Urobili: x   Blood: x / Protein: x / Nitrite: x   Leuk Esterase: x / RBC: x / WBC x   Sq Epi: x / Non Sq Epi: x / Bacteria: x        RADIOLOGY & ADDITIONAL TESTS:    Imaging Personally Reviewed:  [x] YES  [ ] NO    Consultant(s) Notes Reviewed:  [x] YES  [ ] NO    MEDICATIONS  (STANDING):  allopurinol 300 milliGRAM(s) Oral daily  amLODIPine   Tablet 10 milliGRAM(s) Oral every 24 hours  atorvastatin 20 milliGRAM(s) Oral at bedtime  azithromycin  IVPB 500 milliGRAM(s) IV Intermittent every 24 hours  cefTRIAXone   IVPB 1000 milliGRAM(s) IV Intermittent every 24 hours  chlorhexidine 2% Cloths 1 Application(s) Topical daily  clopidogrel Tablet 75 milliGRAM(s) Oral daily  dexAMETHasone     Tablet 6 milliGRAM(s) Oral daily  finasteride 5 milliGRAM(s) Oral daily  mupirocin 2% Ointment 1 Application(s) Topical three times a day  pantoprazole  Injectable 40 milliGRAM(s) IV Push daily    MEDICATIONS  (PRN):  acetaminophen     Tablet .. 650 milliGRAM(s) Oral every 6 hours PRN Temp greater or equal to 38C (100.4F), Mild Pain (1 - 3)  aluminum hydroxide/magnesium hydroxide/simethicone Suspension 30 milliLiter(s) Oral every 4 hours PRN Dyspepsia  melatonin 3 milliGRAM(s) Oral at bedtime PRN Insomnia  ondansetron Injectable 4 milliGRAM(s) IV Push every 8 hours PRN Nausea and/or Vomiting      Care Discussed with Consultants/Other Providers [x] YES  [ ] NO    Vital Signs Last 24 Hrs  T(C): 36.4 (13 Jan 2024 06:00), Max: 37 (12 Jan 2024 21:49)  T(F): 97.5 (13 Jan 2024 06:00), Max: 98.6 (12 Jan 2024 21:49)  HR: 78 (13 Jan 2024 06:00) (77 - 83)  BP: 134/60 (13 Jan 2024 06:00) (134/60 - 142/61)  BP(mean): --  RR: 17 (13 Jan 2024 06:00) (16 - 17)  SpO2: 96% (13 Jan 2024 06:00) (96% - 99%)    Parameters below as of 13 Jan 2024 06:00  Patient On (Oxygen Delivery Method): room air      I&O's Summary        PHYSICAL EXAM:  GENERAL: NAD, well-developed, comfortable, hard of hearing  HEAD:  Atraumatic, Normocephalic  EYES: EOMI, PERRLA, conjunctiva and sclera clear  NECK: Supple, No JVD  CHEST/LUNG: mild decrease breath sounds bilaterally; No wheeze   HEART: Regular rate and rhythm; No murmurs, rubs, or gallops  ABDOMEN: Soft, Nontender, Nondistended; Bowel sounds present  Neuro: awake alert, no focal weakness    EXTREMITIES:  2+ Peripheral Pulses, No clubbing, cyanosis, or edema  SKIN: No rashes or lesions

## 2024-01-13 NOTE — PROGRESS NOTE ADULT - NSPROGADDITIONALINFOA_GEN_ALL_CORE
d/w pt, the wife at bedside.     - Dr. SOLITARIO Lawson ([facility])  - [phone] d/w pt, the wife at bedside.     - Dr. SOLITARIO Lawson ([facility])  - [phone]

## 2024-01-13 NOTE — PROGRESS NOTE ADULT - SUBJECTIVE AND OBJECTIVE BOX
Date of Service: 01-13-24 @ 11:29    Patient is a 78y old  Male who presents with a chief complaint of Weakness, cough (12 Jan 2024 15:14)      Any change in ROS: wife at bedside:  seems OK; no sob:  no cough : no phlegm      MEDICATIONS  (STANDING):  allopurinol 300 milliGRAM(s) Oral daily  amLODIPine   Tablet 10 milliGRAM(s) Oral every 24 hours  atorvastatin 20 milliGRAM(s) Oral at bedtime  azithromycin  IVPB 500 milliGRAM(s) IV Intermittent every 24 hours  cefTRIAXone   IVPB 1000 milliGRAM(s) IV Intermittent every 24 hours  chlorhexidine 2% Cloths 1 Application(s) Topical daily  clopidogrel Tablet 75 milliGRAM(s) Oral daily  dexAMETHasone     Tablet 6 milliGRAM(s) Oral daily  finasteride 5 milliGRAM(s) Oral daily  mupirocin 2% Ointment 1 Application(s) Topical three times a day  pantoprazole  Injectable 40 milliGRAM(s) IV Push daily    MEDICATIONS  (PRN):  acetaminophen     Tablet .. 650 milliGRAM(s) Oral every 6 hours PRN Temp greater or equal to 38C (100.4F), Mild Pain (1 - 3)  aluminum hydroxide/magnesium hydroxide/simethicone Suspension 30 milliLiter(s) Oral every 4 hours PRN Dyspepsia  melatonin 3 milliGRAM(s) Oral at bedtime PRN Insomnia  ondansetron Injectable 4 milliGRAM(s) IV Push every 8 hours PRN Nausea and/or Vomiting    Vital Signs Last 24 Hrs  T(C): 36.4 (13 Jan 2024 06:00), Max: 37 (12 Jan 2024 21:49)  T(F): 97.5 (13 Jan 2024 06:00), Max: 98.6 (12 Jan 2024 21:49)  HR: 78 (13 Jan 2024 06:00) (77 - 83)  BP: 134/60 (13 Jan 2024 06:00) (134/60 - 142/61)  BP(mean): --  RR: 17 (13 Jan 2024 06:00) (16 - 17)  SpO2: 96% (13 Jan 2024 06:00) (96% - 99%)    Parameters below as of 13 Jan 2024 06:00  Patient On (Oxygen Delivery Method): room air        I&O's Summary        Physical Exam:   GENERAL: NAD, well-groomed, well-developed  HEENT: PORFIRIO/   Atraumatic, Normocephalic  ENMT: No tonsillar erythema, exudates, or enlargement; Moist mucous membranes, Good dentition, No lesions  NECK: Supple, No JVD, Normal thyroid  CHEST/LUNG: Clear to auscultaion- no wheezing  CVS: Regular rate and rhythm; No murmurs, rubs, or gallops  GI: : Soft, Nontender, Nondistended; Bowel sounds present  NERVOUS SYSTEM:  Alert & Oriented X3  EXTREMITIES:- edema  LYMPH: No lymphadenopathy noted  SKIN: No rashes or lesions  ENDOCRINOLOGY: No Thyromegaly  PSYCH: Appropriate    Labs:                              10.1   10.89 )-----------( 283      ( 13 Jan 2024 06:00 )             31.3                         10.2   8.34  )-----------( 228      ( 12 Jan 2024 06:00 )             32.3                         10.5   8.38  )-----------( 234      ( 11 Jan 2024 12:21 )             33.0     01-13    135  |  103  |  30<H>  ----------------------------<  118<H>  4.3   |  20<L>  |  1.42<H>  01-12    139  |  106  |  24<H>  ----------------------------<  98  4.7   |  21<L>  |  1.59<H>  01-11    140  |  103  |  27<H>  ----------------------------<  105<H>  4.4   |  24  |  1.80<H>    Ca    8.0<L>      13 Jan 2024 06:00  Ca    7.9<L>      12 Jan 2024 06:00  Ca    7.9<L>      11 Jan 2024 12:21  Phos  3.0     01-13  Phos  2.8     01-12  Mg     2.30     01-13  Mg     2.20     01-12    TPro  5.8<L>  /  Alb  2.5<L>  /  TBili  0.2  /  DBili  x   /  AST  24  /  ALT  30  /  AlkPhos  88  01-12  TPro  6.2  /  Alb  2.8<L>  /  TBili  0.3  /  DBili  x   /  AST  21  /  ALT  25  /  AlkPhos  89  01-11    CAPILLARY BLOOD GLUCOSE      POCT Blood Glucose.: 250 mg/dL (12 Jan 2024 12:25)      LIVER FUNCTIONS - ( 12 Jan 2024 06:00 )  Alb: 2.5 g/dL / Pro: 5.8 g/dL / ALK PHOS: 88 U/L / ALT: 30 U/L / AST: 24 U/L / GGT: x           PT/INR - ( 11 Jan 2024 12:21 )   PT: 12.5 sec;   INR: 1.11 ratio         PTT - ( 11 Jan 2024 12:21 )  PTT:27.1 sec  Urinalysis Basic - ( 13 Jan 2024 06:00 )    Color: x / Appearance: x / SG: x / pH: x  Gluc: 118 mg/dL / Ketone: x  / Bili: x / Urobili: x   Blood: x / Protein: x / Nitrite: x   Leuk Esterase: x / RBC: x / WBC x   Sq Epi: x / Non Sq Epi: x / Bacteria: x            RECENT CULTURES:  01-11 @ 18:37 .Blood Blood-Peripheral                No growth at 24 hours    01-11 @ 15:20 .Blood Blood-Peripheral     radSkin: no visible rashes on limited examination    IMAGING/LABS/PATHOLOGY: I have personally reviewed the relevant labs, pathology, and imaging as noted in the HPI.  In addition,    ASSESSMENT/PLAN    78 year old man with history of small cell lung cancer diagnosed in January of 2022 he was treated with carbo etoposide and atezolizumab maintenance with excellent response. He developed the right hand numbness in the setting of a high KPS For which he received 18 gray GKSRS to the left parietal area in may of 2023. 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 He is symptomatic from this with lower extremity weakness although this is improved somewhat. He also states he has 2 mo history of new left sided deafness, imbalance, and vertigo.       We discussed the use of whole brain palliative radiation in this setting, namely to improve quality of life through the reduction of symptoms.  We would like to complete as inpatient given it may be dificult for him to complete as outpatient, and plan 5 treatments.  We talked about the risks, benefits, acute and long term side effects, as well as expected treatment outcomes.  He was given the opportunity to ask questions, which were answered to his apparent satisfaction.  He provided written consent to proceed with radiation therapy. We will arrange for inpatient treatment.   Skin: no visible rashes on limited examination    IMAGING/LABS/PATHOLOGY: I have personally reviewed the relevant labs, pathology, and imaging as noted in the HPI.  In addition,    ASSESSMENT/PLAN    78 year old man with history of small cell lung cancer diagnosed in January of 2022 he was treated with carbo etoposide and atezolizumab maintenance with excellent response. He developed the right hand numbness in the setting of a high KPS For which he received 18 gray GKSRS to the left parietal area in may of 2023. 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 He is symptomatic from this with lower extremity weakness although this is improved somewhat. He also states he has 2 mo history of new left sided deafness, imbalance, and vertigo.       We discussed the use of whole brain palliative radiation in this setting, namely to improve quality of life through the reduction of symptoms.  We would like to complete as inpatient given it may be dificult for him to complete as outpatient, and plan 5 treatments.  We talked about the risks, benefits, acute and long term side effects, as well as expected treatment outcomes.  He was given the opportunity to ask questions, which were answered to his apparent satisfaction.  He provided written consent to proceed with radiation therapy. We will arrange for inpatient treatment.   < from: CT Chest No Cont (01.11.24 @ 19:32) >  PLEURA: Trace right pleural effusion.  VESSELS: Atherosclerotic changes of the aorta and coronary arteries.  HEART: Heart size is normal. No pericardial effusion.  MEDIASTINUM AND ALESSANDRO: No lymphadenopathy.Tiny paratracheal lymph nodes   are unchanged. Right mediport catheter with tip at the atriocaval   junction.  CHEST WALL AND LOWER NECK: Within normal limits.    ABDOMEN AND PELVIS:  LIVER: Within normal limits.  BILE DUCTS: Normal caliber.  GALLBLADDER: Within normal limits.  SPLEEN: Within normal limits.  PANCREAS: Within normal limits.  ADRENALS: Within normal limits.  KIDNEYS/URETERS: Left renal cyst. A cystic lesion in the right kidney is   not fully characterized on this exam but is unchanged.    BLADDER: Within normal limits.  REPRODUCTIVE ORGANS: Prostate within normal limits.    BOWEL: No bowel obstruction. Appendix is normal.  PERITONEUM: No ascites.  VESSELS: Atherosclerotic changes. Left iliac artery stent.  RETROPERITONEUM/LYMPHNODES: No lymphadenopathy.  ABDOMINAL WALL: Within normal limits.  BONES: Degenerative changes.    IMPRESSION:  New confluent opacities in the right lung can represent pneumonia.  No evidence of disease in the abdomen and pelvis.    < end of copied text >             No growth at 24 hours          RESPIRATORY CULTURES:          Studies  Chest X-RAY  CT SCAN Chest   Venous Dopplers: LE:   CT Abdomen  Others

## 2024-01-13 NOTE — PROGRESS NOTE ADULT - ASSESSMENT
79 yo M with Pmhx metastatic lung cancer, HLD, CAD, PAD, gout, and HTN presents to the ED with weakness and cough, Pt reports that he has been feeling weak for the last 1 week. He has been mostly in his bed and felt dizzy whenever he tried to move or stand. He also had a productive cough and CHOI. Denies any recent fever, chills, nausea, vomiting, abdominal pain or diarrhea. 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 Because of this new finding and worsening symptoms, his oncologist recommended him to come to the ED.   In the ED, his vitals were notable for HTN. Labs were notable for chronic anemia, GRAEME on CKD. CT head showed no acute mass or bleeding.   (11 Jan 2024 18:45)   above confirmed:  he came to hospital and was found to have non covid coronavirus infection:  currently he is not wheezing  He is on room air 99% sao2:         Non covid coronavirus infection:   Lung cancer metastatic:   CAD  PAD  HTN    Non covid coronavirus infection:   -supportive care:   -no need for steroids  from pulm stand point  as he is not wheezing  -he is on room air with excellent sao2:   -his ct chest showed:   New confluent opacities in the right lung can represent pneumonia. No evidence of disease in the abdomen and pelvis- cont antibiotics  check legionella   1/13: legionella is still pending:  no sob:  no cough : no phlegm  : no chest pain : cnt antibiotics  he is on room air:  seems to be feeling a little better then yesterday   Lung cancer metastatic:   -has brain mets:  on dexa:  seen by rad onc:  has leptomeningeal disease:  defer to rad onc   CAD  -cont home meds   PAD  -per PMD  HTN  -controlled:     dvt prophylaxis    dw team

## 2024-01-13 NOTE — PROGRESS NOTE ADULT - PROBLEM SELECTOR PLAN 3
Patient with LE weakness post LP   -However, on exam, 5/5 strength in LE  -CT head showed Small focus of encephalomalacia/gliotic change in the left postcentral gyrus. 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

## 2024-01-14 NOTE — PROGRESS NOTE ADULT - NSPROGADDITIONALINFOA_GEN_ALL_CORE
- Dr. SOLITARIO Lawson ([facility])  - [phone] - Dr. SOLITARIO Lawson ([facility])  - [phone] Cr slighty up 1.6, not so far from baseline.  urinating well.   gentle IVF, US renal. post void bladder scan.     overall feeling better.     - Dr. SOLITARIO Dubonet ([facility])  - [phone] Cr slighty up 1.6, not so far from baseline.  urinating well.   gentle IVF, US renal. post void bladder scan.     overall feeling better.     - Dr. SOLITARIO Dubonet ([facility])  - [phone]

## 2024-01-14 NOTE — PROGRESS NOTE ADULT - SUBJECTIVE AND OBJECTIVE BOX
Date of Service: 01-14-24 @ 09:43    Patient is a 78y old  Male who presents with a chief complaint of Weakness, cough (14 Jan 2024 08:41)      Any change in ROS: seems to be doing ok : no sob : 99% on room air:  mild cough     MEDICATIONS  (STANDING):  allopurinol 300 milliGRAM(s) Oral daily  amLODIPine   Tablet 10 milliGRAM(s) Oral every 24 hours  AQUAPHOR (petrolatum Ointment) 1 Application(s) Topical three times a day  atorvastatin 20 milliGRAM(s) Oral at bedtime  cefTRIAXone   IVPB 1000 milliGRAM(s) IV Intermittent every 24 hours  chlorhexidine 2% Cloths 1 Application(s) Topical daily  clopidogrel Tablet 75 milliGRAM(s) Oral daily  dexAMETHasone     Tablet 6 milliGRAM(s) Oral daily  finasteride 5 milliGRAM(s) Oral daily  mupirocin 2% Ointment 1 Application(s) Topical three times a day  pantoprazole  Injectable 40 milliGRAM(s) IV Push daily  sodium chloride 0.9%. 1000 milliLiter(s) (100 mL/Hr) IV Continuous <Continuous>    MEDICATIONS  (PRN):  acetaminophen     Tablet .. 650 milliGRAM(s) Oral every 6 hours PRN Temp greater or equal to 38C (100.4F), Mild Pain (1 - 3)  aluminum hydroxide/magnesium hydroxide/simethicone Suspension 30 milliLiter(s) Oral every 4 hours PRN Dyspepsia  melatonin 3 milliGRAM(s) Oral at bedtime PRN Insomnia  ondansetron Injectable 4 milliGRAM(s) IV Push every 8 hours PRN Nausea and/or Vomiting    Vital Signs Last 24 Hrs  T(C): 37.4 (14 Jan 2024 05:49), Max: 37.4 (14 Jan 2024 05:49)  T(F): 99.3 (14 Jan 2024 05:49), Max: 99.3 (14 Jan 2024 05:49)  HR: 90 (14 Jan 2024 05:49) (68 - 90)  BP: 152/71 (14 Jan 2024 05:49) (150/67 - 159/52)  BP(mean): --  RR: 17 (14 Jan 2024 05:49) (16 - 17)  SpO2: 99% (14 Jan 2024 05:49) (99% - 99%)    Parameters below as of 14 Jan 2024 05:49  Patient On (Oxygen Delivery Method): room air        I&O's Summary        Physical Exam:   GENERAL: Obese  HEENT: PORFIRIO/   Atraumatic, Normocephalic  ENMT: No tonsillar erythema, exudates, or enlargement; Moist mucous membranes, Good dentition, No lesions  NECK: Supple, No JVD, Normal thyroid  CHEST/LUNG: Clear to auscultaion- no wheezing  CVS: Regular rate and rhythm; No murmurs, rubs, or gallops  GI: : -present  NERVOUS SYSTEM:  Alert & Oriented X3  EXTREMITIES: - edema  LYMPH: No lymphadenopathy noted  SKIN: No rashes or lesions  ENDOCRINOLOGY: No Thyromegaly  PSYCH: Appropriate    Labs:                              10.3   12.92 )-----------( 304      ( 14 Jan 2024 07:30 )             30.7                         10.1   10.89 )-----------( 283      ( 13 Jan 2024 06:00 )             31.3                         10.2   8.34  )-----------( 228      ( 12 Jan 2024 06:00 )             32.3                         10.5   8.38  )-----------( 234      ( 11 Jan 2024 12:21 )             33.0     01-14    137  |  104  |  36<H>  ----------------------------<  108<H>  4.2   |  21<L>  |  1.62<H>  01-13    135  |  103  |  30<H>  ----------------------------<  118<H>  4.3   |  20<L>  |  1.42<H>  01-12    139  |  106  |  24<H>  ----------------------------<  98  4.7   |  21<L>  |  1.59<H>  01-11    140  |  103  |  27<H>  ----------------------------<  105<H>  4.4   |  24  |  1.80<H>    Ca    8.0<L>      14 Jan 2024 07:30  Ca    8.0<L>      13 Jan 2024 06:00  Phos  3.4     01-14  Phos  3.0     01-13  Mg     2.30     01-14  Mg     2.30     01-13    TPro  5.8<L>  /  Alb  2.5<L>  /  TBili  0.2  /  DBili  x   /  AST  24  /  ALT  30  /  AlkPhos  88  01-12  TPro  6.2  /  Alb  2.8<L>  /  TBili  0.3  /  DBili  x   /  AST  21  /  ALT  25  /  AlkPhos  89  01-11    CAPILLARY BLOOD GLUCOSE              Urinalysis Basic - ( 14 Jan 2024 07:30 )    Color: x / Appearance: x / SG: x / pH: x  Gluc: 108 mg/dL / Ketone: x  / Bili: x / Urobili: x   Blood: x / Protein: x / Nitrite: x   Leuk Esterase: x / RBC: x / WBC x   Sq Epi: x / Non Sq Epi: x / Bacteria: x      rad< from: CT Abdomen and Pelvis No Cont (01.11.24 @ 19:32) >  VESSELS: Atherosclerotic changes of the aorta and coronary arteries.  HEART: Heart size is normal. No pericardial effusion.  MEDIASTINUM AND ALESSANDRO: No lymphadenopathy.Tiny paratracheal lymph nodes   are unchanged. Right mediport catheter with tip at the atriocaval   junction.  CHEST WALL AND LOWER NECK: Within normal limits.    ABDOMEN AND PELVIS:  LIVER: Within normal limits.  BILE DUCTS: Normal caliber.  GALLBLADDER: Within normal limits.  SPLEEN: Within normal limits.  PANCREAS: Within normal limits.  ADRENALS: Within normal limits.  KIDNEYS/URETERS: Left renal cyst. A cystic lesion in the right kidney is   not fully characterized on this exam but is unchanged.    BLADDER: Within normal limits.  REPRODUCTIVE ORGANS: Prostate within normal limits.    BOWEL: No bowel obstruction. Appendix is normal.  PERITONEUM: No ascites.  VESSELS: Atherosclerotic changes. Left iliac artery stent.  RETROPERITONEUM/LYMPHNODES: No lymphadenopathy.  ABDOMINAL WALL: Within normal limits.  BONES: Degenerative changes.    IMPRESSION:  New confluent opacities in the right lung can represent pneumonia.  No evidence of disease in the abdomen and pelvis.    --- End of Report---    < end of copied text >        RECENT CULTURES:  01-11 @ 18:37 .Blood Blood-Peripheral                No growth at 48 Hours    01-11 @ 15:20 .Blood Blood-Peripheral                No growth at 48 Hours          RESPIRATORY CULTURES:          Studies  Chest X-RAY  CT SCAN Chest   Venous Dopplers: LE:   CT Abdomen  Others

## 2024-01-14 NOTE — PROGRESS NOTE ADULT - ASSESSMENT
79 yo M with Pmhx metastatic lung cancer, HLD, CAD, PAD, gout, and HTN presents to the ED with weakness and cough, Pt reports that he has been feeling weak for the last 1 week. He has been mostly in his bed and felt dizzy whenever he tried to move or stand. He also had a productive cough and CHOI. Denies any recent fever, chills, nausea, vomiting, abdominal pain or diarrhea. 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 Because of this new finding and worsening symptoms, his oncologist recommended him to come to the ED.   In the ED, his vitals were notable for HTN. Labs were notable for chronic anemia, GRAEME on CKD. CT head showed no acute mass or bleeding.   (11 Jan 2024 18:45)   above confirmed:  he came to hospital and was found to have non covid coronavirus infection:  currently he is not wheezing  He is on room air 99% sao2:         Non covid coronavirus infection:   Lung cancer metastatic:   CAD  PAD  HTN    Non covid coronavirus infection:   -supportive care:   -no need for steroids  from pulm stand point  as he is not wheezing  -he is on room air with excellent sao2:   -his ct chest showed:   New confluent opacities in the right lung can represent pneumonia. No evidence of disease in the abdomen and pelvis- cont antibiotics  check legionella   1/13: legionella is still pending:  no sob:  no cough : no phlegm  : no chest pain : cnt antibiotics  he is on room air:  seems to be feeling a little better then yesterday   1/14; legionella is negative:  Afebrile:  WBC ic slightly increasing:  clinically looks OK;  cont antibiotics for 7 days   Lung cancer metastatic:   -has brain mets:  on dexa:  seen by rad onc:  has leptomeningeal disease:  defer to rad onc   1/14: now for possible WBRT   CAD  -cont home meds   PAD  -per PMD  HTN  -controlled:     dvt prophylaxis    dw team   77 yo M with Pmhx metastatic lung cancer, HLD, CAD, PAD, gout, and HTN presents to the ED with weakness and cough, Pt reports that he has been feeling weak for the last 1 week. He has been mostly in his bed and felt dizzy whenever he tried to move or stand. He also had a productive cough and CHOI. Denies any recent fever, chills, nausea, vomiting, abdominal pain or diarrhea. 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 Because of this new finding and worsening symptoms, his oncologist recommended him to come to the ED.   In the ED, his vitals were notable for HTN. Labs were notable for chronic anemia, GRAEME on CKD. CT head showed no acute mass or bleeding.   (11 Jan 2024 18:45)   above confirmed:  he came to hospital and was found to have non covid coronavirus infection:  currently he is not wheezing  He is on room air 99% sao2:         Non covid coronavirus infection:   Lung cancer metastatic:   CAD  PAD  HTN    Non covid coronavirus infection:   -supportive care:   -no need for steroids  from pulm stand point  as he is not wheezing  -he is on room air with excellent sao2:   -his ct chest showed:   New confluent opacities in the right lung can represent pneumonia. No evidence of disease in the abdomen and pelvis- cont antibiotics  check legionella   1/13: legionella is still pending:  no sob:  no cough : no phlegm  : no chest pain : cnt antibiotics  he is on room air:  seems to be feeling a little better then yesterday   1/14; legionella is negative:  Afebrile:  WBC ic slightly increasing:  clinically looks OK;  cont antibiotics for 7 days   Lung cancer metastatic:   -has brain mets:  on dexa:  seen by rad onc:  has leptomeningeal disease:  defer to rad onc   1/14: now for possible WBRT   CAD  -cont home meds   PAD  -per PMD  HTN  -controlled:     dvt prophylaxis    dw team

## 2024-01-14 NOTE — PROGRESS NOTE ADULT - ASSESSMENT
79 yo M with PMHx metastatic lung cancer, HLD, CAD, PAD, gout, and HTN presents to the ED with weakness and cough, possibly due to PNA.  77 yo M with PMHx metastatic lung cancer, HLD, CAD, PAD, gout, and HTN presents to the ED with weakness and cough, possibly due to PNA.

## 2024-01-15 NOTE — PROGRESS NOTE ADULT - ASSESSMENT
77 yo M with Pmhx metastatic lung cancer, HLD, CAD, PAD, gout, and HTN presents to the ED with weakness and cough, Pt reports that he has been feeling weak for the last 1 week. He has been mostly in his bed and felt dizzy whenever he tried to move or stand. He also had a productive cough and CHOI. Denies any recent fever, chills, nausea, vomiting, abdominal pain or diarrhea. 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 Because of this new finding and worsening symptoms, his oncologist recommended him to come to the ED.   In the ED, his vitals were notable for HTN. Labs were notable for chronic anemia, GRAEME on CKD. CT head showed no acute mass or bleeding.   (11 Jan 2024 18:45)   above confirmed:  he came to hospital and was found to have non covid coronavirus infection:  currently he is not wheezing  He is on room air 99% sao2:         Non covid coronavirus infection:   Lung cancer metastatic:   CAD  PAD  HTN    Non covid coronavirus infection:   -supportive care:   -no need for steroids  from pulm stand point  as he is not wheezing  -he is on room air with excellent sao2:   -his ct chest showed:   New confluent opacities in the right lung can represent pneumonia. No evidence of disease in the abdomen and pelvis- cont antibiotics  check legionella   1/13: legionella is still pending:  no sob:  no cough : no phlegm  : no chest pain : cnt antibiotics  he is on room air:  seems to be feeling a little better then yesterday   1/14; legionella is negative:  Afebrile:  WBC ic slightly increasing:  clinically looks OK;  cont antibiotics for 7 days   1/15: seems to be doing  ok ; no sob:  no cough : no phlegm  on antibiotics : d5/7   Lung cancer metastatic:   -has brain mets:  on dexa:  seen by rad onc:  has leptomeningeal disease:  defer to rad onc   1/14: now for possible WBRT   CAD  -cont home meds   PAD  -per PMD  HTN  -controlled:     dvt prophylaxis    dw team   79 yo M with Pmhx metastatic lung cancer, HLD, CAD, PAD, gout, and HTN presents to the ED with weakness and cough, Pt reports that he has been feeling weak for the last 1 week. He has been mostly in his bed and felt dizzy whenever he tried to move or stand. He also had a productive cough and CHOI. Denies any recent fever, chills, nausea, vomiting, abdominal pain or diarrhea. 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 Because of this new finding and worsening symptoms, his oncologist recommended him to come to the ED.   In the ED, his vitals were notable for HTN. Labs were notable for chronic anemia, GRAEME on CKD. CT head showed no acute mass or bleeding.   (11 Jan 2024 18:45)   above confirmed:  he came to hospital and was found to have non covid coronavirus infection:  currently he is not wheezing  He is on room air 99% sao2:         Non covid coronavirus infection:   Lung cancer metastatic:   CAD  PAD  HTN    Non covid coronavirus infection:   -supportive care:   -no need for steroids  from pulm stand point  as he is not wheezing  -he is on room air with excellent sao2:   -his ct chest showed:   New confluent opacities in the right lung can represent pneumonia. No evidence of disease in the abdomen and pelvis- cont antibiotics  check legionella   1/13: legionella is still pending:  no sob:  no cough : no phlegm  : no chest pain : cnt antibiotics  he is on room air:  seems to be feeling a little better then yesterday   1/14; legionella is negative:  Afebrile:  WBC ic slightly increasing:  clinically looks OK;  cont antibiotics for 7 days   1/15: seems to be doing  ok ; no sob:  no cough : no phlegm  on antibiotics : d5/7   Lung cancer metastatic:   -has brain mets:  on dexa:  seen by rad onc:  has leptomeningeal disease:  defer to rad onc   1/14: now for possible WBRT   CAD  -cont home meds   PAD  -per PMD  HTN  -controlled:     dvt prophylaxis    dw team

## 2024-01-15 NOTE — PROGRESS NOTE ADULT - SUBJECTIVE AND OBJECTIVE BOX
SUBJECTIVE/ OVERNIGHT EVENTS:  feels well  cr back to baseline  no pain  awake alert  no cp, no sob, no n/v/d. no abdominal pain.  no headache, no dizziness.         --------------------------------------------------------------------------------------------  LABS:                        10.7   11.67 )-----------( 300      ( 15 Kevin 2024 06:33 )             32.3     01-15    137  |  104  |  36<H>  ----------------------------<  105<H>  4.0   |  21<L>  |  1.33<H>    Ca    7.9<L>      15 Kevin 2024 06:33  Phos  3.5     01-15  Mg     2.20     01-15    TPro  x   /  Alb  2.8<L>  /  TBili  x   /  DBili  x   /  AST  x   /  ALT  x   /  AlkPhos  x   01-15      CAPILLARY BLOOD GLUCOSE            Urinalysis Basic - ( 15 Kevin 2024 06:33 )    Color: x / Appearance: x / SG: x / pH: x  Gluc: 105 mg/dL / Ketone: x  / Bili: x / Urobili: x   Blood: x / Protein: x / Nitrite: x   Leuk Esterase: x / RBC: x / WBC x   Sq Epi: x / Non Sq Epi: x / Bacteria: x        RADIOLOGY & ADDITIONAL TESTS:    Imaging Personally Reviewed:  [x] YES  [ ] NO    Consultant(s) Notes Reviewed:  [x] YES  [ ] NO    MEDICATIONS  (STANDING):  allopurinol 300 milliGRAM(s) Oral daily  amLODIPine   Tablet 10 milliGRAM(s) Oral every 24 hours  AQUAPHOR (petrolatum Ointment) 1 Application(s) Topical three times a day  atorvastatin 20 milliGRAM(s) Oral at bedtime  cefTRIAXone   IVPB 1000 milliGRAM(s) IV Intermittent every 24 hours  chlorhexidine 2% Cloths 1 Application(s) Topical daily  clopidogrel Tablet 75 milliGRAM(s) Oral daily  dexAMETHasone     Tablet 6 milliGRAM(s) Oral daily  finasteride 5 milliGRAM(s) Oral daily  mupirocin 2% Ointment 1 Application(s) Topical three times a day  pantoprazole  Injectable 40 milliGRAM(s) IV Push daily    MEDICATIONS  (PRN):  acetaminophen     Tablet .. 650 milliGRAM(s) Oral every 6 hours PRN Temp greater or equal to 38C (100.4F), Mild Pain (1 - 3)  aluminum hydroxide/magnesium hydroxide/simethicone Suspension 30 milliLiter(s) Oral every 4 hours PRN Dyspepsia  melatonin 3 milliGRAM(s) Oral at bedtime PRN Insomnia  ondansetron Injectable 4 milliGRAM(s) IV Push every 8 hours PRN Nausea and/or Vomiting      Care Discussed with Consultants/Other Providers [x] YES  [ ] NO    Vital Signs Last 24 Hrs  T(C): 36.4 (15 Kevin 2024 12:05), Max: 36.7 (14 Jan 2024 13:16)  T(F): 97.6 (15 Kevin 2024 12:05), Max: 98.1 (14 Jan 2024 20:10)  HR: 96 (15 Kevin 2024 12:05) (74 - 96)  BP: 151/82 (15 Kevin 2024 12:05) (133/61 - 151/82)  BP(mean): --  RR: 18 (15 Kevin 2024 12:05) (16 - 18)  SpO2: 97% (15 Kevin 2024 12:05) (97% - 99%)    Parameters below as of 15 Kevin 2024 12:05  Patient On (Oxygen Delivery Method): room air      I&O's Summary        PHYSICAL EXAM:  GENERAL: NAD, well-developed, comfortable, hard of hearing, on room air  HEAD:  Atraumatic, Normocephalic  EYES: EOMI, PERRLA, conjunctiva and sclera clear  NECK: Supple, No JVD  CHEST/LUNG: mild decrease breath sounds bilaterally; No wheeze   HEART: Regular rate and rhythm; No murmurs, rubs, or gallops  ABDOMEN: Soft, Nontender, Nondistended; Bowel sounds present  Neuro: awake alert, no focal weakness    EXTREMITIES:  2+ Peripheral Pulses, No clubbing, cyanosis, or edema  SKIN: No rashes or lesions

## 2024-01-15 NOTE — PROGRESS NOTE ADULT - SUBJECTIVE AND OBJECTIVE BOX
Date of Service: 01-15-24 @ 10:32    Patient is a 78y old  Male who presents with a chief complaint of Weakness, cough (14 Jan 2024 09:42)      Any change in ROS: seems to be doing  ok : no sob:  on room air:  dw rn : no events overnight     MEDICATIONS  (STANDING):  allopurinol 300 milliGRAM(s) Oral daily  amLODIPine   Tablet 10 milliGRAM(s) Oral every 24 hours  AQUAPHOR (petrolatum Ointment) 1 Application(s) Topical three times a day  atorvastatin 20 milliGRAM(s) Oral at bedtime  cefTRIAXone   IVPB 1000 milliGRAM(s) IV Intermittent every 24 hours  chlorhexidine 2% Cloths 1 Application(s) Topical daily  clopidogrel Tablet 75 milliGRAM(s) Oral daily  dexAMETHasone     Tablet 6 milliGRAM(s) Oral daily  finasteride 5 milliGRAM(s) Oral daily  mupirocin 2% Ointment 1 Application(s) Topical three times a day  pantoprazole  Injectable 40 milliGRAM(s) IV Push daily  sodium chloride 0.9%. 1000 milliLiter(s) (100 mL/Hr) IV Continuous <Continuous>    MEDICATIONS  (PRN):  acetaminophen     Tablet .. 650 milliGRAM(s) Oral every 6 hours PRN Temp greater or equal to 38C (100.4F), Mild Pain (1 - 3)  aluminum hydroxide/magnesium hydroxide/simethicone Suspension 30 milliLiter(s) Oral every 4 hours PRN Dyspepsia  melatonin 3 milliGRAM(s) Oral at bedtime PRN Insomnia  ondansetron Injectable 4 milliGRAM(s) IV Push every 8 hours PRN Nausea and/or Vomiting    Vital Signs Last 24 Hrs  T(C): 36.4 (15 Kevin 2024 05:30), Max: 36.7 (14 Jan 2024 13:16)  T(F): 97.5 (15 Kevin 2024 05:30), Max: 98.1 (14 Jan 2024 20:10)  HR: 74 (15 Kevin 2024 05:30) (74 - 85)  BP: 144/64 (15 Kevin 2024 05:30) (133/61 - 144/64)  BP(mean): --  RR: 17 (15 Kevin 2024 05:30) (16 - 17)  SpO2: 97% (15 Kevin 2024 05:30) (97% - 99%)    Parameters below as of 15 Kevin 2024 05:30  Patient On (Oxygen Delivery Method): room air        I&O's Summary        Physical Exam:   GENERAL: NAD, well-groomed, well-developed  HEENT: PORFIRIO/   Atraumatic, Normocephalic  ENMT: No tonsillar erythema, exudates, or enlargement; Moist mucous membranes, Good dentition, No lesions  NECK: Supple, No JVD, Normal thyroid  CHEST/LUNG: Clear to auscultaion- no wheezing  CVS: Regular rate and rhythm; No murmurs, rubs, or gallops  GI: : Soft, Nontender, Nondistended; Bowel sounds present  NERVOUS SYSTEM:  Alert & awake  EXTREMITIES: - edema  LYMPH: No lymphadenopathy noted  SKIN: No rashes or lesions  ENDOCRINOLOGY: No Thyromegaly  PSYCH:calm   Labs:                              10.7   11.67 )-----------( 300      ( 15 Kevin 2024 06:33 )             32.3                         10.3   12.92 )-----------( 304      ( 14 Jan 2024 07:30 )             30.7                         10.1   10.89 )-----------( 283      ( 13 Jan 2024 06:00 )             31.3                         10.2   8.34  )-----------( 228      ( 12 Jan 2024 06:00 )             32.3                         10.5   8.38  )-----------( 234      ( 11 Jan 2024 12:21 )             33.0     01-15    137  |  104  |  36<H>  ----------------------------<  105<H>  4.0   |  21<L>  |  1.33<H>  01-14    137  |  104  |  36<H>  ----------------------------<  108<H>  4.2   |  21<L>  |  1.62<H>  01-13    135  |  103  |  30<H>  ----------------------------<  118<H>  4.3   |  20<L>  |  1.42<H>  01-12    139  |  106  |  24<H>  ----------------------------<  98  4.7   |  21<L>  |  1.59<H>  01-11    140  |  103  |  27<H>  ----------------------------<  105<H>  4.4   |  24  |  1.80<H>    Ca    7.9<L>      15 Kevin 2024 06:33  Ca    8.0<L>      14 Jan 2024 07:30  Phos  3.5     01-15  Phos  3.4     01-14  Mg     2.20     01-15  Mg     2.30     01-14    TPro  5.8<L>  /  Alb  2.5<L>  /  TBili  0.2  /  DBili  x   /  AST  24  /  ALT  30  /  AlkPhos  88  01-12  TPro  6.2  /  Alb  2.8<L>  /  TBili  0.3  /  DBili  x   /  AST  21  /  ALT  25  /  AlkPhos  89  01-11    CAPILLARY BLOOD GLUCOSE              Urinalysis Basic - ( 15 Kevin 2024 06:33 )    Color: x / Appearance: x / SG: x / pH: x  Gluc: 105 mg/dL / Ketone: x  / Bili: x / Urobili: x   Blood: x / Protein: x / Nitrite: x   Leuk Esterase: x / RBC: x / WBC x   Sq Epi: x / Non Sq Epi: x / Bacteria: x            RECENT CULTURES:  01-11 @ 18:37 .Blood Blood-Peripheral           < from: CT Chest No Cont (01.11.24 @ 19:32) >  PROCEDURE:  CT of the Chest, Abdomen and Pelvis was performed.  Sagittal and coronal reformats were performed.    FINDINGS:  CHEST:  LUNGS AND LARGE AIRWAYS: Patent central airways. Confluent air space   opacities in the right upper lobe and laterally in the right middle and   lower lobes, new. Left lower lobe subpleural opacity of 9 mm (309:211) is   slightly decreased. Chronic changes in the lung bases.  PLEURA: Trace right pleural effusion.  VESSELS: Atherosclerotic changes of the aorta and coronary arteries.  HEART: Heart size is normal. No pericardial effusion.  MEDIASTINUM AND ALESSANDRO: No lymphadenopathy.Tiny paratracheal lymph nodes   are unchanged. Right mediport catheter with tip at the atriocaval   junction.  CHEST WALL AND LOWER NECK: Within normal limits.    ABDOMEN AND PELVIS:  LIVER: Within normal limits.  BILE DUCTS: Normal caliber.  GALLBLADDER: Within normal limits.  SPLEEN: Within normal limits.  PANCREAS: Within normal limits.  ADRENALS: Within normal limits.  KIDNEYS/URETERS: Left renal cyst. A cystic lesion in the right kidney is   not fully characterized on this exam but is unchanged.    BLADDER: Within normal limits.  REPRODUCTIVE ORGANS: Prostate within normal limits.    BOWEL: No bowel obstruction. Appendix is normal.  PERITONEUM: No ascites.  VESSELS: Atherosclerotic changes. Left iliac artery stent.  RETROPERITONEUM/LYMPHNODES: No lymphadenopathy.  ABDOMINAL WALL: Within normal limits.  BONES: Degenerative changes.    IMPRESSION:  New confluent opacities in the right lung can represent pneumonia.  No evidence of disease in the abdomen and pelvis.    --- End of Report---      < end of copied text >       No growth at 72 Hours    01-11 @ 15:20 .Blood Blood-Peripheral                No growth at 72 Hours          RESPIRATORY CULTURES:          Studies  Chest X-RAY  CT SCAN Chest   Venous Dopplers: LE:   CT Abdomen  Others

## 2024-01-15 NOTE — PROGRESS NOTE ADULT - NSPROGADDITIONALINFOA_GEN_ALL_CORE
- Dr. SOLITARIO Lawson ([facility])  - [phone] - Dr. SOLITARIO Lawson ([facility])  - [phone] - Dr. SOLITARIO Lawson ([facility])  - [phone]

## 2024-01-15 NOTE — PHYSICAL THERAPY INITIAL EVALUATION ADULT - PATIENT PROFILE REVIEW, REHAB EVAL
ACTIVITY: OOB with Assistance; Spoke with RN Rose Mary Aguilar prior to PT evaluation--> Pt OK for PT consult/OOB activity; vitals taken; /64mmHg, heart rate 81bpm/yes

## 2024-01-15 NOTE — PHYSICAL THERAPY INITIAL EVALUATION ADULT - ADDITIONAL COMMENTS
Pt reports that he lives in a private house on the first level with his wife; his daughter and grandchildren live on the 2nd floor; no steps to negotiate. Prior to hospital admission, pt was completely independent and used a single axis cane. Pt denies any recent falls.    Pt left comfortable in bed, NAD, all lines intact, all precautions maintained, with call bell in reach, bed alarm on, and RN aware of PT evaluation.

## 2024-01-15 NOTE — PHYSICAL THERAPY INITIAL EVALUATION ADULT - PERTINENT HX OF CURRENT PROBLEM, REHAB EVAL
79 yo M with Pmhx metastatic lung cancer, HLD, CAD, PAD, gout, and HTN presents to the ED with weakness and cough, possibly due to PNA. MR Lumbar Spine/ Thoracic spine: IMPRESSION: Degenerative changes. No abnormal lesions are identified at this time. CT Chest IMPRESSION: New confluent opacities in the right lung can represent pneumonia. CT Abdomen and pelvis IMPRESSION: No evidence of disease in the abdomen and pelvis. 77 yo M with Pmhx metastatic lung cancer, HLD, CAD, PAD, gout, and HTN presents to the ED with weakness and cough, possibly due to PNA. MR Lumbar Spine/ Thoracic spine: IMPRESSION: Degenerative changes. No abnormal lesions are identified at this time. CT Chest IMPRESSION: New confluent opacities in the right lung can represent pneumonia. CT Abdomen and pelvis IMPRESSION: No evidence of disease in the abdomen and pelvis.

## 2024-01-15 NOTE — PHYSICAL THERAPY INITIAL EVALUATION ADULT - GENERAL OBSERVATIONS, REHAB EVAL
Pt encountered in semisupine position, no distress, AxOx4, [place], with +IV. Pt agreeable to participate in PT evaluation. Pt encountered in semisupine position, no distress, AxOx4, [place], with +IV. Pt agreeable to participate in PT evaluation. Pt encountered in semisupine position, no distress, AxOx4, [place], with +IV. Pt agreeable to participate in PT evaluation.

## 2024-01-15 NOTE — PHYSICAL THERAPY INITIAL EVALUATION ADULT - NSPTDMEREC_GEN_A_CORE
patient will require a rolling walker to be able to perform their MRADL's within their home; please follow PT notes

## 2024-01-16 NOTE — PROGRESS NOTE ADULT - PROBLEM SELECTOR PLAN 3
Patient with LE weakness post LP   - However, on exam, 5/5 strength in LE  - CT head showed Small focus of encephalomalacia/gliotic change in the left postcentral gyrus. 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 no cord compression.   -C/w decadron 6 mg daily per heme/onc  - PT eval: outpt PT.  - Fall precautions

## 2024-01-16 NOTE — PROGRESS NOTE ADULT - SUBJECTIVE AND OBJECTIVE BOX
Date of Service: 01-16-24 @ 11:57    Patient is a 78y old  Male who presents with a chief complaint of Weakness, cough (15 Kevin 2024 12:36)      Any change in ROS: wife at bedside:  no sob:  pt ia alert and awake and responding well to questions    MEDICATIONS  (STANDING):  allopurinol 300 milliGRAM(s) Oral daily  amLODIPine   Tablet 10 milliGRAM(s) Oral every 24 hours  AQUAPHOR (petrolatum Ointment) 1 Application(s) Topical three times a day  atorvastatin 20 milliGRAM(s) Oral at bedtime  chlorhexidine 2% Cloths 1 Application(s) Topical daily  clopidogrel Tablet 75 milliGRAM(s) Oral daily  dexAMETHasone     Tablet 6 milliGRAM(s) Oral daily  ergocalciferol 08481 Unit(s) Oral every week  finasteride 5 milliGRAM(s) Oral daily  mupirocin 2% Ointment 1 Application(s) Topical three times a day  pantoprazole  Injectable 40 milliGRAM(s) IV Push daily    MEDICATIONS  (PRN):  acetaminophen     Tablet .. 650 milliGRAM(s) Oral every 6 hours PRN Temp greater or equal to 38C (100.4F), Mild Pain (1 - 3)  aluminum hydroxide/magnesium hydroxide/simethicone Suspension 30 milliLiter(s) Oral every 4 hours PRN Dyspepsia  melatonin 3 milliGRAM(s) Oral at bedtime PRN Insomnia  ondansetron Injectable 4 milliGRAM(s) IV Push every 8 hours PRN Nausea and/or Vomiting    Vital Signs Last 24 Hrs  T(C): 36.4 (16 Jan 2024 05:25), Max: 36.4 (15 Kevin 2024 12:05)  T(F): 97.6 (16 Jan 2024 05:25), Max: 97.6 (15 Kevin 2024 12:05)  HR: 75 (16 Jan 2024 05:25) (75 - 96)  BP: 152/69 (16 Jan 2024 05:25) (151/82 - 153/82)  BP(mean): --  RR: 18 (16 Jan 2024 05:25) (17 - 18)  SpO2: 98% (16 Jan 2024 05:25) (97% - 98%)    Parameters below as of 16 Jan 2024 05:25  Patient On (Oxygen Delivery Method): room air        I&O's Summary    15 Kevin 2024 07:01  -  16 Jan 2024 07:00  --------------------------------------------------------  IN: 0 mL / OUT: 550 mL / NET: -550 mL          Physical Exam:   GENERAL: NAD, well-groomed, well-developed  HEENT: PORFIRIO/   Atraumatic, Normocephalic  ENMT: No tonsillar erythema, exudates, or enlargement; Moist mucous membranes, Good dentition, No lesions  NECK: Supple, No JVD, Normal thyroid  CHEST/LUNG: Clear to auscultaion- no wheezing  CVS: Regular rate and rhythm; No murmurs, rubs, or gallops  GI: : Soft, Nontender, Nondistended; Bowel sounds present  NERVOUS SYSTEM:  Alert & Oriented X3  EXTREMITIES: -edema  LYMPH: No lymphadenopathy noted  SKIN: No rashes or lesions  ENDOCRINOLOGY: No Thyromegaly  PSYCH: Appropriate    Labs:                              10.4   12.88 )-----------( 352      ( 16 Jan 2024 05:45 )             32.2                         10.7   11.67 )-----------( 300      ( 15 Kevin 2024 06:33 )             32.3                         10.3   12.92 )-----------( 304      ( 14 Jan 2024 07:30 )             30.7                         10.1   10.89 )-----------( 283      ( 13 Jan 2024 06:00 )             31.3     01-16    138  |  105  |  36<H>  ----------------------------<  99  4.4   |  23  |  1.44<H>  01-15    137  |  104  |  36<H>  ----------------------------<  105<H>  4.0   |  21<L>  |  1.33<H>  01-14    137  |  104  |  36<H>  ----------------------------<  108<H>  4.2   |  21<L>  |  1.62<H>  01-13    135  |  103  |  30<H>  ----------------------------<  118<H>  4.3   |  20<L>  |  1.42<H>    Ca    7.8<L>      16 Jan 2024 05:45  Ca    7.9<L>      15 Kevin 2024 06:33  Phos  2.8     01-16  Phos  3.5     01-15  Mg     2.30     01-16  Mg     2.20     01-15    TPro  x   /  Alb  2.8<L>  /  TBili  x   /  DBili  x   /  AST  x   /  ALT  x   /  AlkPhos  x   01-15    CAPILLARY BLOOD GLUCOSE          LIVER FUNCTIONS - ( 15 Kevin 2024 06:33 )  Alb: 2.8 g/dL / Pro: x     / ALK PHOS: x     / ALT: x     / AST: x     / GGT: x             Urinalysis Basic - ( 16 Jan 2024 05:45 )    Color: x / Appearance: x / SG: x / pH: x  Gluc: 99 mg/dL / Ketone: x  / Bili: x / Urobili: x   Blood: x / Protein: x / Nitrite: x   Leuk Esterase: x / RBC: x / WBC x   Sq Epi: x / Non Sq Epi: x / Bacteria: x      rad< from: MR Thoracic Spine w/wo IV Cont (01.15.24 @ 13:28) >  of the left neural foramen and mild to moderate narrowing of the right   neural foramen    L1-2: Normal    L2-3: Disc bulge and associated annularfissure is seen. Bilateral   hypertrophic facet joint changes seen. Mild narrowing of the spinal canal    L3-4: Bilateral hypertrophic facet joint changes seen. Mild narrowing of   both neural foramen    L4-5: Disc bulge and bilateral hypertrophic facet joint changes seen.   Mild to moderate narrowing of the spinal canal. Mild to moderate   narrowing of both neural foramen    The conus ends at bottom of L1 and appears normal.    Evaluation of the paraspinal soft tissues appear normal. Bilateral renal   cysts are identified.    Both SI joints appear intact.    IMPRESSION: Degenerative changes as described above.    No abnormal lesions are identified at this time.    --- End of Report ---            JUNO ACOSTA MD; Attending Radiologist  This document has been electronically signed. Kevin 15 2024  2:11PM    < end of copied text >  < from: CT Chest No Cont (01.11.24 @ 19:32) >  PANCREAS: Within normal limits.  ADRENALS: Within normal limits.  KIDNEYS/URETERS: Left renal cyst. A cystic lesion in the right kidney is   not fully characterized on this exam but is unchanged.    BLADDER: Within normal limits.  REPRODUCTIVE ORGANS: Prostate within normal limits.    BOWEL: No bowel obstruction. Appendix is normal.  PERITONEUM: No ascites.  VESSELS: Atherosclerotic changes. Left iliac artery stent.  RETROPERITONEUM/LYMPHNODES: No lymphadenopathy.  ABDOMINAL WALL: Within normal limits.  BONES: Degenerative changes.    IMPRESSION:  New confluent opacities in the right lung can represent pneumonia.  No evidence of disease in the abdomen and pelvis.    --- End of Report---    < end of copied text >        RECENT CULTURES:  01-11 @ 18:37 .Blood Blood-Peripheral                No growth at 4 days    01-11 @ 15:20 .Blood Blood-Peripheral                No growth at 4 days          RESPIRATORY CULTURES:          Studies  Chest X-RAY  CT SCAN Chest   Venous Dopplers: LE:   CT Abdomen  Others               Date of Service: 01-16-24 @ 11:57    Patient is a 78y old  Male who presents with a chief complaint of Weakness, cough (15 Kevin 2024 12:36)      Any change in ROS: wife at bedside:  no sob:  pt ia alert and awake and responding well to questions    MEDICATIONS  (STANDING):  allopurinol 300 milliGRAM(s) Oral daily  amLODIPine   Tablet 10 milliGRAM(s) Oral every 24 hours  AQUAPHOR (petrolatum Ointment) 1 Application(s) Topical three times a day  atorvastatin 20 milliGRAM(s) Oral at bedtime  chlorhexidine 2% Cloths 1 Application(s) Topical daily  clopidogrel Tablet 75 milliGRAM(s) Oral daily  dexAMETHasone     Tablet 6 milliGRAM(s) Oral daily  ergocalciferol 15442 Unit(s) Oral every week  finasteride 5 milliGRAM(s) Oral daily  mupirocin 2% Ointment 1 Application(s) Topical three times a day  pantoprazole  Injectable 40 milliGRAM(s) IV Push daily    MEDICATIONS  (PRN):  acetaminophen     Tablet .. 650 milliGRAM(s) Oral every 6 hours PRN Temp greater or equal to 38C (100.4F), Mild Pain (1 - 3)  aluminum hydroxide/magnesium hydroxide/simethicone Suspension 30 milliLiter(s) Oral every 4 hours PRN Dyspepsia  melatonin 3 milliGRAM(s) Oral at bedtime PRN Insomnia  ondansetron Injectable 4 milliGRAM(s) IV Push every 8 hours PRN Nausea and/or Vomiting    Vital Signs Last 24 Hrs  T(C): 36.4 (16 Jan 2024 05:25), Max: 36.4 (15 Kevin 2024 12:05)  T(F): 97.6 (16 Jan 2024 05:25), Max: 97.6 (15 Kevin 2024 12:05)  HR: 75 (16 Jan 2024 05:25) (75 - 96)  BP: 152/69 (16 Jan 2024 05:25) (151/82 - 153/82)  BP(mean): --  RR: 18 (16 Jan 2024 05:25) (17 - 18)  SpO2: 98% (16 Jan 2024 05:25) (97% - 98%)    Parameters below as of 16 Jan 2024 05:25  Patient On (Oxygen Delivery Method): room air        I&O's Summary    15 Kevin 2024 07:01  -  16 Jan 2024 07:00  --------------------------------------------------------  IN: 0 mL / OUT: 550 mL / NET: -550 mL          Physical Exam:   GENERAL: NAD, well-groomed, well-developed  HEENT: PORFIRIO/   Atraumatic, Normocephalic  ENMT: No tonsillar erythema, exudates, or enlargement; Moist mucous membranes, Good dentition, No lesions  NECK: Supple, No JVD, Normal thyroid  CHEST/LUNG: Clear to auscultaion- no wheezing  CVS: Regular rate and rhythm; No murmurs, rubs, or gallops  GI: : Soft, Nontender, Nondistended; Bowel sounds present  NERVOUS SYSTEM:  Alert & Oriented X3  EXTREMITIES: -edema  LYMPH: No lymphadenopathy noted  SKIN: No rashes or lesions  ENDOCRINOLOGY: No Thyromegaly  PSYCH: Appropriate    Labs:                              10.4   12.88 )-----------( 352      ( 16 Jan 2024 05:45 )             32.2                         10.7   11.67 )-----------( 300      ( 15 Kevin 2024 06:33 )             32.3                         10.3   12.92 )-----------( 304      ( 14 Jan 2024 07:30 )             30.7                         10.1   10.89 )-----------( 283      ( 13 Jan 2024 06:00 )             31.3     01-16    138  |  105  |  36<H>  ----------------------------<  99  4.4   |  23  |  1.44<H>  01-15    137  |  104  |  36<H>  ----------------------------<  105<H>  4.0   |  21<L>  |  1.33<H>  01-14    137  |  104  |  36<H>  ----------------------------<  108<H>  4.2   |  21<L>  |  1.62<H>  01-13    135  |  103  |  30<H>  ----------------------------<  118<H>  4.3   |  20<L>  |  1.42<H>    Ca    7.8<L>      16 Jan 2024 05:45  Ca    7.9<L>      15 Kevin 2024 06:33  Phos  2.8     01-16  Phos  3.5     01-15  Mg     2.30     01-16  Mg     2.20     01-15    TPro  x   /  Alb  2.8<L>  /  TBili  x   /  DBili  x   /  AST  x   /  ALT  x   /  AlkPhos  x   01-15    CAPILLARY BLOOD GLUCOSE          LIVER FUNCTIONS - ( 15 Kevin 2024 06:33 )  Alb: 2.8 g/dL / Pro: x     / ALK PHOS: x     / ALT: x     / AST: x     / GGT: x             Urinalysis Basic - ( 16 Jan 2024 05:45 )    Color: x / Appearance: x / SG: x / pH: x  Gluc: 99 mg/dL / Ketone: x  / Bili: x / Urobili: x   Blood: x / Protein: x / Nitrite: x   Leuk Esterase: x / RBC: x / WBC x   Sq Epi: x / Non Sq Epi: x / Bacteria: x      rad< from: MR Thoracic Spine w/wo IV Cont (01.15.24 @ 13:28) >  of the left neural foramen and mild to moderate narrowing of the right   neural foramen    L1-2: Normal    L2-3: Disc bulge and associated annularfissure is seen. Bilateral   hypertrophic facet joint changes seen. Mild narrowing of the spinal canal    L3-4: Bilateral hypertrophic facet joint changes seen. Mild narrowing of   both neural foramen    L4-5: Disc bulge and bilateral hypertrophic facet joint changes seen.   Mild to moderate narrowing of the spinal canal. Mild to moderate   narrowing of both neural foramen    The conus ends at bottom of L1 and appears normal.    Evaluation of the paraspinal soft tissues appear normal. Bilateral renal   cysts are identified.    Both SI joints appear intact.    IMPRESSION: Degenerative changes as described above.    No abnormal lesions are identified at this time.    --- End of Report ---            JUNO ACOSTA MD; Attending Radiologist  This document has been electronically signed. Kevin 15 2024  2:11PM    < end of copied text >  < from: CT Chest No Cont (01.11.24 @ 19:32) >  PANCREAS: Within normal limits.  ADRENALS: Within normal limits.  KIDNEYS/URETERS: Left renal cyst. A cystic lesion in the right kidney is   not fully characterized on this exam but is unchanged.    BLADDER: Within normal limits.  REPRODUCTIVE ORGANS: Prostate within normal limits.    BOWEL: No bowel obstruction. Appendix is normal.  PERITONEUM: No ascites.  VESSELS: Atherosclerotic changes. Left iliac artery stent.  RETROPERITONEUM/LYMPHNODES: No lymphadenopathy.  ABDOMINAL WALL: Within normal limits.  BONES: Degenerative changes.    IMPRESSION:  New confluent opacities in the right lung can represent pneumonia.  No evidence of disease in the abdomen and pelvis.    --- End of Report---    < end of copied text >        RECENT CULTURES:  01-11 @ 18:37 .Blood Blood-Peripheral                No growth at 4 days    01-11 @ 15:20 .Blood Blood-Peripheral                No growth at 4 days          RESPIRATORY CULTURES:          Studies  Chest X-RAY  CT SCAN Chest   Venous Dopplers: LE:   CT Abdomen  Others

## 2024-01-16 NOTE — PROGRESS NOTE ADULT - SUBJECTIVE AND OBJECTIVE BOX
SUBJECTIVE/ OVERNIGHT EVENTS:  Feels well  Radiation today  BP rising. Cr stable.  home Lisinopril 10 mg restarted.   no cp, no sob, no n/v/d. no abdominal pain.  no headache, no dizziness.       --------------------------------------------------------------------------------------------  LABS:                        10.4   12.88 )-----------( 352      ( 16 Jan 2024 05:45 )             32.2     01-16    138  |  105  |  36<H>  ----------------------------<  99  4.4   |  23  |  1.44<H>    Ca    7.8<L>      16 Jan 2024 05:45  Phos  2.8     01-16  Mg     2.30     01-16    TPro  x   /  Alb  2.8<L>  /  TBili  x   /  DBili  x   /  AST  x   /  ALT  x   /  AlkPhos  x   01-15      CAPILLARY BLOOD GLUCOSE            Urinalysis Basic - ( 16 Jan 2024 05:45 )    Color: x / Appearance: x / SG: x / pH: x  Gluc: 99 mg/dL / Ketone: x  / Bili: x / Urobili: x   Blood: x / Protein: x / Nitrite: x   Leuk Esterase: x / RBC: x / WBC x   Sq Epi: x / Non Sq Epi: x / Bacteria: x        RADIOLOGY & ADDITIONAL TESTS:    Imaging Personally Reviewed:  [x] YES  [ ] NO    Consultant(s) Notes Reviewed:  [x] YES  [ ] NO    MEDICATIONS  (STANDING):  allopurinol 300 milliGRAM(s) Oral daily  amLODIPine   Tablet 10 milliGRAM(s) Oral every 24 hours  AQUAPHOR (petrolatum Ointment) 1 Application(s) Topical three times a day  atorvastatin 20 milliGRAM(s) Oral at bedtime  chlorhexidine 2% Cloths 1 Application(s) Topical daily  clopidogrel Tablet 75 milliGRAM(s) Oral daily  dexAMETHasone     Tablet 6 milliGRAM(s) Oral daily  ergocalciferol 48541 Unit(s) Oral every week  finasteride 5 milliGRAM(s) Oral daily  lisinopril 10 milliGRAM(s) Oral daily  mupirocin 2% Ointment 1 Application(s) Topical three times a day  pantoprazole  Injectable 40 milliGRAM(s) IV Push daily    MEDICATIONS  (PRN):  acetaminophen     Tablet .. 650 milliGRAM(s) Oral every 6 hours PRN Temp greater or equal to 38C (100.4F), Mild Pain (1 - 3)  aluminum hydroxide/magnesium hydroxide/simethicone Suspension 30 milliLiter(s) Oral every 4 hours PRN Dyspepsia  melatonin 3 milliGRAM(s) Oral at bedtime PRN Insomnia  ondansetron Injectable 4 milliGRAM(s) IV Push every 8 hours PRN Nausea and/or Vomiting      Care Discussed with Consultants/Other Providers [x] YES  [ ] NO    Vital Signs Last 24 Hrs  T(C): 36.7 (16 Jan 2024 14:29), Max: 36.7 (16 Jan 2024 14:29)  T(F): 98.1 (16 Jan 2024 14:29), Max: 98.1 (16 Jan 2024 14:29)  HR: 78 (16 Jan 2024 19:35) (70 - 85)  BP: 151/60 (16 Jan 2024 19:35) (151/60 - 174/67)  BP(mean): --  RR: 18 (16 Jan 2024 14:29) (17 - 18)  SpO2: 99% (16 Jan 2024 14:29) (98% - 99%)    Parameters below as of 16 Jan 2024 14:29  Patient On (Oxygen Delivery Method): room air      I&O's Summary    15 Kevin 2024 07:01  -  16 Jan 2024 07:00  --------------------------------------------------------  IN: 0 mL / OUT: 550 mL / NET: -550 mL        PHYSICAL EXAM:  GENERAL: NAD, well-developed, comfortable, hard of hearing, on room air  HEAD:  Atraumatic, Normocephalic  EYES: EOMI, PERRLA, conjunctiva and sclera clear  NECK: Supple, No JVD  CHEST/LUNG: mild decrease breath sounds bilaterally; No wheeze   HEART: Regular rate and rhythm; No murmurs, rubs, or gallops  ABDOMEN: Soft, Nontender, Nondistended; Bowel sounds present  Neuro: awake alert, no focal weakness    EXTREMITIES:  2+ Peripheral Pulses, No clubbing, cyanosis, or edema  SKIN: No rashes or lesions

## 2024-01-16 NOTE — PROGRESS NOTE ADULT - ASSESSMENT
77 yo M with Pmhx metastatic lung cancer, HLD, CAD, PAD, gout, and HTN presents to the ED with weakness and cough, Pt reports that he has been feeling weak for the last 1 week. He has been mostly in his bed and felt dizzy whenever he tried to move or stand. He also had a productive cough and CHOI. Denies any recent fever, chills, nausea, vomiting, abdominal pain or diarrhea. 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 Because of this new finding and worsening symptoms, his oncologist recommended him to come to the ED.   In the ED, his vitals were notable for HTN. Labs were notable for chronic anemia, GRAEME on CKD. CT head showed no acute mass or bleeding.   (11 Jan 2024 18:45)   above confirmed:  he came to hospital and was found to have non covid coronavirus infection:  currently he is not wheezing  He is on room air 99% sao2:         Non covid coronavirus infection:   Lung cancer metastatic:   CAD  PAD  HTN    Non covid coronavirus infection:   -supportive care:   -no need for steroids  from pulm stand point  as he is not wheezing  -he is on room air with excellent sao2:   -his ct chest showed:   New confluent opacities in the right lung can represent pneumonia. No evidence of disease in the abdomen and pelvis- cont antibiotics  check legionella   1/13: legionella is still pending:  no sob:  no cough : no phlegm  : no chest pain : cnt antibiotics  he is on room air:  seems to be feeling a little better then yesterday   1/14; legionella is negative:  Afebrile:  WBC ic slightly increasing:  clinically looks OK;  cont antibiotics for 7 days   1/15: seems to be doing  ok ; no sob:  no cough : no phlegm  on antibiotics : d5/7   1/16: antibiosis finished in 5 days:  defer to primary team : he is afebrile and has normal WBC count   Lung cancer metastatic:   -has brain mets:  on dexa:  seen by rad onc:  has leptomeningeal disease:  defer to rad onc   1/14: now for possible WBRT   1/16: awaiting radiation  CAD  -cont home meds   PAD  -per PMD  HTN  -controlled:     dvt prophylaxis    dw team

## 2024-01-16 NOTE — PROGRESS NOTE ADULT - NSPROGADDITIONALINFOA_GEN_ALL_CORE
Austin Young will be covering for the pt starting 1/17/24. He can be reached at  if needed.     - Dr. SOLITARIO Lawson ([facility])  - [phone]

## 2024-01-16 NOTE — CHART NOTE - NSCHARTNOTEFT_GEN_A_CORE
Mr. Worthy, 78y.o. 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           WBRT from 1/12/24 through 1/19/24.   tolerating well.           < from: MR Head w/wo IV Cont (12.20.23 @ 10:56) >    IMPRESSION:    Reidentified is a subcentimeter focus within the cortex of the left   postcentral gyrus, decreased in size and enhancement since the prior   exam. This lesion is too small to see by perfusion imaging. Small amount   of surrounding gliosis appears unchanged. No new enhancing lesions are   identified within the brain parenchyma.    Reidentified is abnormal enhancing soft tissue within the left internal   auditory canal extending from the left cerebellopontine angle, involving   the intracanalicular portion of the IAC, and appears to extend into the   cochlea, mildly increased in size since prior exam, particularly along   the cerebellopontine angle. Likewise, there is enlargement and greater   degree of enhancing soft tissue within the mid and distal aspect of the   intracanalicular portion of the right internal auditory canal with   extension into the basilar turn of the right cochlea.Given appearance   and increase in size since prior exam, the lesions are most worrisome for   metastases/leptomeningeal neoplasm. There is no additional evidence of   leptomeningeal enhancement of the rest of the cranial nerves or within   the subarachnoid spaces of the brain.    This was discussed with Dr. Mejia.    < end of copied text >

## 2024-01-17 NOTE — PROGRESS NOTE ADULT - ASSESSMENT
79 yo M with Pmhx metastatic lung cancer, HLD, CAD, PAD, gout, and HTN presents to the ED with weakness and cough, Pt reports that he has been feeling weak for the last 1 week. He has been mostly in his bed and felt dizzy whenever he tried to move or stand. He also had a productive cough and CHOI. Denies any recent fever, chills, nausea, vomiting, abdominal pain or diarrhea. 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 Because of this new finding and worsening symptoms, his oncologist recommended him to come to the ED.   In the ED, his vitals were notable for HTN. Labs were notable for chronic anemia, GRAEME on CKD. CT head showed no acute mass or bleeding.   (11 Jan 2024 18:45)   above confirmed:  he came to hospital and was found to have non covid coronavirus infection:  currently he is not wheezing  He is on room air 99% sao2:         Non covid coronavirus infection:   Lung cancer metastatic:   CAD  PAD  HTN    Non covid coronavirus infection:   -supportive care:   -no need for steroids  from pulm stand point  as he is not wheezing  -he is on room air with excellent sao2:   -his ct chest showed:   New confluent opacities in the right lung can represent pneumonia. No evidence of disease in the abdomen and pelvis- cont antibiotics  check legionella   1/13: legionella is still pending:  no sob:  no cough : no phlegm  : no chest pain : cnt antibiotics  he is on room air:  seems to be feeling a little better then yesterday   1/14; legionella is negative:  Afebrile:  WBC ic slightly increasing:  clinically looks OK;  cont antibiotics for 7 days   1/15: seems to be doing  ok ; no sob:  no cough : no phlegm  on antibiotics : d5/7   1/16: antibiosis finished in 5 days:  defer to primary team : he is afebrile and has normal WBC count   1/17: remains afebrile:  no sob:  on room air: alert and awake  Lung cancer metastatic:   -has brain mets:  on dexa:  seen by rad onc:  has leptomeningeal disease:  defer to rad onc   1/14: now for possible WBRT   1/16: awaiting radiation  CAD  -cont home meds   PAD  -per PMD  HTN  -controlled:     dvt prophylaxis    dw team

## 2024-01-17 NOTE — PROGRESS NOTE ADULT - PROBLEM SELECTOR PLAN 3
Flagyl 500 mg BID x 7 days with test of cure in 3 months   Patient with LE weakness post LP   - However, on exam, 5/5 strength in LE  - CT head showed Small focus of encephalomalacia/gliotic change in the left postcentral gyrus. 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 no cord compression.   -C/w decadron 6 mg daily per heme/onc  - PT eval: outpt PT.  - Fall precautions

## 2024-01-17 NOTE — PROGRESS NOTE ADULT - SUBJECTIVE AND OBJECTIVE BOX
Tolerating radiation  Mild dizziness when sitting up    Vital Signs Last 24 Hrs  T(C): 36.6 (17 Jan 2024 06:39), Max: 36.7 (16 Jan 2024 14:29)  T(F): 97.9 (17 Jan 2024 06:39), Max: 98.1 (16 Jan 2024 14:29)  HR: 75 (17 Jan 2024 06:39) (70 - 78)  BP: 158/66 (17 Jan 2024 06:39) (142/60 - 174/67)  BP(mean): --  RR: 17 (17 Jan 2024 06:39) (17 - 18)  SpO2: 100% (17 Jan 2024 06:39) (99% - 100%)    I&O's Summary        PHYSICAL EXAM:  GENERAL: NAD, well-developed, comfortable, hard of hearing, on room air  HEAD:  Atraumatic, Normocephalic  EYES: EOMI, PERRLA, conjunctiva and sclera clear  NECK: Supple, No JVD  CHEST/LUNG: mild decrease breath sounds bilaterally; No wheeze   HEART: Regular rate and rhythm; No murmurs, rubs, or gallops  ABDOMEN: Soft, Nontender, Nondistended; Bowel sounds present  Neuro: awake alert, no focal weakness    EXTREMITIES:  2+ Peripheral Pulses, No clubbing, cyanosis, or edema      LABS:                        10.2   12.74 )-----------( 366      ( 17 Jan 2024 05:52 )             31.3     01-17    136  |  104  |  45<H>  ----------------------------<  148<H>  4.6   |  21<L>  |  1.61<H>    Ca    7.5<L>      17 Jan 2024 05:52  Phos  2.8     01-17  Mg     2.10     01-17        CAPILLARY BLOOD GLUCOSE            Urinalysis Basic - ( 17 Jan 2024 05:52 )    Color: x / Appearance: x / SG: x / pH: x  Gluc: 148 mg/dL / Ketone: x  / Bili: x / Urobili: x   Blood: x / Protein: x / Nitrite: x   Leuk Esterase: x / RBC: x / WBC x   Sq Epi: x / Non Sq Epi: x / Bacteria: x        RADIOLOGY & ADDITIONAL TESTS:    Imaging Personally Reviewed:  [x] YES  [ ] NO    Case discussed with NPP:  [X] YES  [ ] NO

## 2024-01-17 NOTE — PROGRESS NOTE ADULT - SUBJECTIVE AND OBJECTIVE BOX
Date of Service: 01-17-24 @ 12:46    Patient is a 78y old  Male who presents with a chief complaint of Weakness, cough (17 Jan 2024 11:45)      Any change in ROS: seems to be doing  ok : no sob:  no cough:     MEDICATIONS  (STANDING):  allopurinol 300 milliGRAM(s) Oral daily  amLODIPine   Tablet 10 milliGRAM(s) Oral every 24 hours  AQUAPHOR (petrolatum Ointment) 1 Application(s) Topical three times a day  atorvastatin 20 milliGRAM(s) Oral at bedtime  chlorhexidine 2% Cloths 1 Application(s) Topical daily  clopidogrel Tablet 75 milliGRAM(s) Oral daily  dexAMETHasone     Tablet 6 milliGRAM(s) Oral daily  ergocalciferol 90789 Unit(s) Oral every week  finasteride 5 milliGRAM(s) Oral daily  lisinopril 10 milliGRAM(s) Oral daily  pantoprazole  Injectable 40 milliGRAM(s) IV Push daily    MEDICATIONS  (PRN):  acetaminophen     Tablet .. 650 milliGRAM(s) Oral every 6 hours PRN Temp greater or equal to 38C (100.4F), Mild Pain (1 - 3)  aluminum hydroxide/magnesium hydroxide/simethicone Suspension 30 milliLiter(s) Oral every 4 hours PRN Dyspepsia  melatonin 3 milliGRAM(s) Oral at bedtime PRN Insomnia  ondansetron Injectable 4 milliGRAM(s) IV Push every 8 hours PRN Nausea and/or Vomiting    Vital Signs Last 24 Hrs  T(C): 36.6 (17 Jan 2024 06:39), Max: 36.7 (16 Jan 2024 14:29)  T(F): 97.9 (17 Jan 2024 06:39), Max: 98.1 (16 Jan 2024 14:29)  HR: 75 (17 Jan 2024 06:39) (70 - 78)  BP: 158/66 (17 Jan 2024 06:39) (142/60 - 174/67)  BP(mean): --  RR: 17 (17 Jan 2024 06:39) (17 - 18)  SpO2: 100% (17 Jan 2024 06:39) (99% - 100%)    Parameters below as of 17 Jan 2024 06:39  Patient On (Oxygen Delivery Method): room air        I&O's Summary        Physical Exam:   GENERAL: NAD, well-groomed, well-developed  HEENT: PORFIRIO/   Atraumatic, Normocephalic  ENMT: No tonsillar erythema, exudates, or enlargement; Moist mucous membranes, Good dentition, No lesions  NECK: Supple, No JVD, Normal thyroid  CHEST/LUNG: Clear to auscultaion  CVS: Regular rate and rhythm; No murmurs, rubs, or gallops  GI: : Soft, Nontender, Nondistended; Bowel sounds present  NERVOUS SYSTEM:  Alert & Oriented X3  EXTREMITIES:  2+ Peripheral Pulses, No clubbing, cyanosis, or edema  LYMPH: No lymphadenopathy noted  SKIN: No rashes or lesions  ENDOCRINOLOGY: No Thyromegaly  PSYCH: Appropriate    Labs:                              10.2   12.74 )-----------( 366      ( 17 Jan 2024 05:52 )             31.3                         10.4   12.88 )-----------( 352      ( 16 Jan 2024 05:45 )             32.2                         10.7   11.67 )-----------( 300      ( 15 Kevin 2024 06:33 )             32.3                         10.3   12.92 )-----------( 304      ( 14 Jan 2024 07:30 )             30.7     01-17    136  |  104  |  45<H>  ----------------------------<  148<H>  4.6   |  21<L>  |  1.61<H>  01-16    138  |  105  |  36<H>  ----------------------------<  99  4.4   |  23  |  1.44<H>  01-15    137  |  104  |  36<H>  ----------------------------<  105<H>  4.0   |  21<L>  |  1.33<H>  01-14    137  |  104  |  36<H>  ----------------------------<  108<H>  4.2   |  21<L>  |  1.62<H>    Ca    7.5<L>      17 Jan 2024 05:52  Ca    7.8<L>      16 Jan 2024 05:45  Phos  2.8     01-17  Phos  2.8     01-16  Mg     2.10     01-17  Mg     2.30     01-16    TPro  x   /  Alb  2.8<L>  /  TBili  x   /  DBili  x   /  AST  x   /  ALT  x   /  AlkPhos  x   01-15    CAPILLARY BLOOD GLUCOSE              Urinalysis Basic - ( 17 Jan 2024 05:52 )    Color: x / Appearance: x / SG: x / pH: x  Gluc: 148 mg/dL / Ketone: x  / Bili: x / Urobili: x   Blood: x / Protein: x / Nitrite: x   Leuk Esterase: x / RBC: x / WBC x   Sq Epi: x / Non Sq Epi: x / Bacteria: x      rad        WBRT from 1/12/24 through 1/19/24.   tolerating well.           < from: MR Head w/wo IV Cont (12.20.23 @ 10:56) >    IMPRESSION:    Reidentified is a subcentimeter focus within the cortex of the left   postcentral gyrus, decreased in size and enhancement since the prior   exam. This lesion is too small to see by perfusion imaging. Small amount   of surrounding gliosis appears unchanged. No new enhancing lesions are   identified within the brain parenchyma.    Reidentified is abnormal enhancing soft tissue within the left internal   auditory canal extending from the left cerebellopontine angle, involving   the intracanalicular portion of the IAC, and appears to extend into the   cochlea, mildly increased in size since prior exam, particularly along   the cerebellopontine angle. Likewise, there is enlargement and greater   degree of enhancing soft tissue within the mid and distal aspect of the   intracanalicular portion of the right internal auditory canal with   extension into the basilar turn of the right cochlea.Given appearance   and increase in size since prior exam, the lesions are most worrisome for   metastases/leptomeningeal neoplasm. There is no additional evidence of   leptomeningeal enhancement of the rest of the cranial nerves or within   the subarachnoid spaces of the brain.    This was discussed with Dr. Mejia.< from: MR Thoracic Spine w/wo IV Cont (01.15.24 @ 13:28) >    L3-4: Bilateral hypertrophic facet joint changes seen. Mild narrowing of   both neural foramen    L4-5: Disc bulge and bilateral hypertrophic facet joint changes seen.   Mild to moderate narrowing of the spinal canal. Mild to moderate   narrowing of both neural foramen    The conus ends at bottom of L1 and appears normal.    Evaluation of the paraspinal soft tissues appear normal. Bilateral renal   cysts are identified.    Both SI joints appear intact.    IMPRESSION: Degenerative changes as described above.    No abnormal lesions are identified at this time.    --- End of Report ---            JUNO ACOSTA MD; Attending Radiologist  This document has been electronically signed. Kevin 15 2024  2:11PM    < end of copied text >  < from: CT Chest No Cont (01.11.24 @ 19:32) >  FINDINGS:  CHEST:  LUNGS AND LARGE AIRWAYS: Patent central airways. Confluent air space   opacities in the right upper lobe and laterally in the right middle and   lower lobes, new. Left lower lobe subpleural opacity of 9 mm (309:211) is   slightly decreased. Chronic changes in the lung bases.  PLEURA: Trace right pleural effusion.  VESSELS: Atherosclerotic changes of the aorta and coronary arteries.  HEART: Heart size is normal. No pericardial effusion.  MEDIASTINUM AND ALESSANDRO: No lymphadenopathy.Tiny paratracheal lymph nodes   are unchanged. Right mediport catheter with tip at the atriocaval   junction.  CHEST WALL AND LOWER NECK: Within normal limits.    ABDOMEN AND PELVIS:  LIVER: Within normal limits.  BILE DUCTS: Normal caliber.  GALLBLADDER: Within normal limits.  SPLEEN: Within normal limits.  PANCREAS: Within normal limits.  ADRENALS: Within normal limits.  KIDNEYS/URETERS: Left renal cyst. A cystic lesion in the right kidney is   not fully characterized on this exam but is unchanged.    BLADDER: Within normal limits.  REPRODUCTIVE ORGANS: Prostate within normal limits.    BOWEL: No bowel obstruction. Appendix is normal.  PERITONEUM: No ascites.  VESSELS: Atherosclerotic changes. Left iliac artery stent.  RETROPERITONEUM/LYMPHNODES: No lymphadenopathy.  ABDOMINAL WALL: Within normal limits.  BONES: Degenerative changes.    IMPRESSION:  New confluent opacities in the right lung can represent pneumonia.  No evidence of disease in the abdomen and pelvis.    --- End of Report---            ISABELLA SHELDON MD; Attending Radiologist  This document has been electronically signed. Jan 12 2024  1:52PM    < end of copied text >        RECENT CULTURES:  01-11 @ 18:37 .Blood Blood-Peripheral                No growth at 5 days    01-11 @ 15:20 .Blood Blood-Peripheral                No growth at 5 days          RESPIRATORY CULTURES:          Studies  Chest X-RAY  CT SCAN Chest   Venous Dopplers: LE:   CT Abdomen  Others

## 2024-01-18 NOTE — PROGRESS NOTE ADULT - PROBLEM SELECTOR PLAN 4
Pt with hx SCLC previously on carbo/etoposide/atezo (followed by Dr. Lin at [facility]), s/p SRS to single brain met left frontal  -Recently developed dizziness/vertigo s/p MR brain and LP confirmed leptomeningeal carcinomatosis   -Rad/onc consulted, plan for RT here. 5 sessions, last on 1/19/24  -CT A/P and chest for restaging reviewed.   MR lumbar and thoracic spine to r/o mets: denegerative changes.  -Hold cellcept for now   -neuro/onc recs appreciated
Pt with hx SCLC previously on carbo/etoposide/atezo (followed by Dr. Lin at [facility]), s/p SRS to single brain met left frontal  -Recently developed dizziness/vertigo s/p MR brain and LP confirmed leptomeningeal carcinomatosis   -Rad/onc consulted, plan for RT here. Likely 5 sessions  -CT A/P and chest for restaging reviewed.   MR lumbar and thoracic spine to r/o mets: denegerative changes.  -Hold cellcept for now   -neuro/onc recs appreciated
Pt with hx SCLC previously on carbo/etoposide/atezo (followed by Dr. Lin at [facility]), s/p SRS to single brain met left frontal  -Recently developed dizziness/vertigo s/p MR brain and LP confirmed leptomeningeal carcinomatosis   -Rad/onc consulted, plan for RT here. 5 sessions, last on 1/19/24  -CT A/P and chest for restaging reviewed.   MR lumbar and thoracic spine to r/o mets: denegerative changes.  -Hold cellcept for now   -neuro/onc recs appreciated
Pt with hx SCLC previously on carbo/etoposide/atezo (followed by Dr. Lin at [facility]), s/p SRS to single brain met left frontal  -Recently developed dizziness/vertigo s/p MR brain and LP confirmed leptomeningeal carcinomatosis   -Rad/onc consulted, plan for RT here.   -CT A/P and chest for restaging reviewed.   -F/u with MR lumbar and thoracic spine to r/o mets   -Hold cellcept for now   -neuro/onc recs appreciated

## 2024-01-18 NOTE — PROGRESS NOTE ADULT - PROBLEM SELECTOR PLAN 6
DVT ppx SCD given brain mets.   Physical therapy. Out of bed to chair with assistance.
DVT ppx SCD given brain mets.   Physical therapy. Out of bed to chair with assistance.
DVT ppx SCD
DVT ppx SCD given brain mets.   Physical therapy. Out of bed to chair with assistance.
DVT ppx SCD given brain mets.   Physical therapy. Out of bed to chair with assistance.
DVT ppx SCD
DVT ppx SCD given brain mets.   Physical therapy. Out of bed to chair with assistance.

## 2024-01-18 NOTE — PROGRESS NOTE ADULT - SUBJECTIVE AND OBJECTIVE BOX
Reports mild dizziness during ambulation  No HA  No CP  No palpitations    Vital Signs Last 24 Hrs  T(C): 37 (18 Jan 2024 05:40), Max: 37 (18 Jan 2024 05:40)  T(F): 98.6 (18 Jan 2024 05:40), Max: 98.6 (18 Jan 2024 05:40)  HR: 73 (18 Jan 2024 05:40) (65 - 74)  BP: 143/68 (18 Jan 2024 05:40) (131/67 - 146/60)  BP(mean): --  RR: 16 (18 Jan 2024 05:40) (16 - 18)  SpO2: 98% (18 Jan 2024 05:40) (98% - 99%)    I&O's Summary    01-17-24 @ 07:01  -  01-18-24 @ 07:00  --------------------------------------------------------  IN: 0 mL / OUT: 600 mL / NET: -600 mL          PHYSICAL EXAM:  GENERAL: NAD, well-developed, comfortable, hard of hearing, on room air  HEAD:  Atraumatic, Normocephalic  EYES: EOMI, PERRLA, conjunctiva and sclera clear  NECK: Supple, No JVD  CHEST/LUNG: mild decrease breath sounds bilaterally; No wheeze   HEART: Regular rate and rhythm; No murmurs, rubs, or gallops  ABDOMEN: Soft, Nontender, Nondistended; Bowel sounds present  Neuro: awake alert, no focal weakness    EXTREMITIES:  2+ Peripheral Pulses, No clubbing, cyanosis, or edema    LABS:                        10.6   13.58 )-----------( 345      ( 18 Jan 2024 06:53 )             32.2     01-18    135  |  103  |  42<H>  ----------------------------<  96  4.9   |  23  |  1.46<H>    Ca    7.8<L>      18 Jan 2024 06:53  Phos  3.5     01-18  Mg     2.30     01-18        CAPILLARY BLOOD GLUCOSE            Urinalysis Basic - ( 18 Jan 2024 06:53 )    Color: x / Appearance: x / SG: x / pH: x  Gluc: 96 mg/dL / Ketone: x  / Bili: x / Urobili: x   Blood: x / Protein: x / Nitrite: x   Leuk Esterase: x / RBC: x / WBC x   Sq Epi: x / Non Sq Epi: x / Bacteria: x        RADIOLOGY & ADDITIONAL TESTS:    Imaging Personally Reviewed:  [x] YES  [ ] NO    Case discussed with NPP:  [X] YES  [ ] NO

## 2024-01-18 NOTE — DIETITIAN INITIAL EVALUATION ADULT - PERTINENT MEDS FT
MEDICATIONS  (STANDING):  allopurinol 300 milliGRAM(s) Oral daily  amLODIPine   Tablet 10 milliGRAM(s) Oral every 24 hours  AQUAPHOR (petrolatum Ointment) 1 Application(s) Topical three times a day  atorvastatin 20 milliGRAM(s) Oral at bedtime  chlorhexidine 2% Cloths 1 Application(s) Topical daily  clopidogrel Tablet 75 milliGRAM(s) Oral daily  dexAMETHasone     Tablet 6 milliGRAM(s) Oral daily  ergocalciferol 19186 Unit(s) Oral every week  finasteride 5 milliGRAM(s) Oral daily  lisinopril 10 milliGRAM(s) Oral daily  pantoprazole  Injectable 40 milliGRAM(s) IV Push daily    MEDICATIONS  (PRN):  acetaminophen     Tablet .. 650 milliGRAM(s) Oral every 6 hours PRN Temp greater or equal to 38C (100.4F), Mild Pain (1 - 3)  aluminum hydroxide/magnesium hydroxide/simethicone Suspension 30 milliLiter(s) Oral every 4 hours PRN Dyspepsia  melatonin 3 milliGRAM(s) Oral at bedtime PRN Insomnia  ondansetron Injectable 4 milliGRAM(s) IV Push every 8 hours PRN Nausea and/or Vomiting

## 2024-01-18 NOTE — PROGRESS NOTE ADULT - PROBLEM SELECTOR PLAN 2
Pt with GRAEME on CKD   -Most likely due to dehydration   -CT A/P no hydronephrosis  -F/u with UA and urine studies   -Hold lasix for now   -received 1L NS x1  -Cr back to baseline. will re-evaluate fluid status daily   - encourage PO intake
Pt with GRAEME on CKD   -Most likely due to dehydration   -CT A/P no hydronephrosis  -Hold lasix for now   -received gentle IVF  -Cr back to baseline.   - encourage oral intake
Pt with GRAEME on CKD   -Most likely due to dehydration   -CT A/P no hydronephrosis  -F/u with UA and urine studies   -Hold lasix for now   -received 1L NS x1  -Cr back to baseline. will re-evaluate fluid status daily   - encourage oral intake
Pt with GRAEME on CKD   -Most likely due to dehydration   -CT A/P no hydronephrosis  -Hold lasix for now   -received gentle IVF  -Cr back to baseline.   - encourage oral intake
Pt with GRAEME on CKD   -Most likely due to dehydration   -CT A/P no hydronephrosis  -Hold lasix for now   -received gentle IVF  -Cr back to baseline.   - encourage oral intake
Pt with GRAEME on CKD   -Most likely due to dehydration   -CT A/P no hydronephrosis  -F/u with UA and urine studies   -Hold lasix for now   -received 1L NS x1  -Cr back to baseline. will re-evaluate fluid status daily   - encourage oral intake
Pt with GRAEME on CKD   -Most likely due to dehydration   -CT A/P no hydronephrosis  -Hold lasix for now   -received gentle IVF  -Cr back to baseline.   - encourage oral intake

## 2024-01-18 NOTE — PROGRESS NOTE ADULT - ASSESSMENT
79 yo M with Pmhx metastatic lung cancer, HLD, CAD, PAD, gout, and HTN presents to the ED with weakness and cough, Pt reports that he has been feeling weak for the last 1 week. He has been mostly in his bed and felt dizzy whenever he tried to move or stand. He also had a productive cough and CHOI. Denies any recent fever, chills, nausea, vomiting, abdominal pain or diarrhea. 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 Because of this new finding and worsening symptoms, his oncologist recommended him to come to the ED.   In the ED, his vitals were notable for HTN. Labs were notable for chronic anemia, GRAEME on CKD. CT head showed no acute mass or bleeding.   (11 Jan 2024 18:45)   above confirmed:  he came to hospital and was found to have non covid coronavirus infection:  currently he is not wheezing  He is on room air 99% sao2:         Non covid coronavirus infection:   Lung cancer metastatic:   CAD  PAD  HTN    Non covid coronavirus infection:   -supportive care:   -no need for steroids  from pulm stand point  as he is not wheezing  -he is on room air with excellent sao2:   -his ct chest showed:   New confluent opacities in the right lung can represent pneumonia. No evidence of disease in the abdomen and pelvis- cont antibiotics  check legionella   1/13: legionella is still pending:  no sob:  no cough : no phlegm  : no chest pain : cnt antibiotics  he is on room air:  seems to be feeling a little better then yesterday   1/14; legionella is negative:  Afebrile:  WBC ic slightly increasing:  clinically looks OK;  cont antibiotics for 7 days   1/15: seems to be doing  ok ; no sob:  no cough : no phlegm  on antibiotics : d5/7   1/16: antibiosis finished in 5 days:  defer to primary team : he is afebrile and has normal WBC count   1/17: remains afebrile:  no sob:  on room air: alert and awake  1/18: he seems better:  no sob:  no cough : no phlegm : on room air  Lung cancer metastatic:   -has brain mets:  on dexa:  seen by rad onc:  has leptomeningeal disease:  defer to rad onc   1/14: now for possible WBRT   1/16: awaiting radiation  1/18: cont per primary team  CAD  -cont home meds   PAD  -per PMD  HTN  -controlled:     dvt prophylaxis    dw team

## 2024-01-18 NOTE — PROGRESS NOTE ADULT - PROBLEM SELECTOR PLAN 5
C/w amlodipine 10 mg daily   -Hold lisinopril and lasix due to GRAEME
C/w amlodipine 10 mg daily   -Hold lisinopril and lasix due to GRAEME
C/w amlodipine 10 mg daily   -initially Holding lisinopril and lasix due to GRAEME  - BP rising. Cr stable.  - home Lisinopril 10 mg restarted.
C/w amlodipine 10 mg daily   -Hold lisinopril and lasix due to GRAEME
C/w amlodipine 10 mg daily   -initially Holding lisinopril and lasix due to GRAEME  - BP rising. Cr stable.  - home Lisinopril 10 mg restarted.
C/w amlodipine 10 mg daily   -initially Holding lisinopril and lasix due to GRAEME  - BP rising. Cr stable.  - home Lisinopril 10 mg restarted.
C/w amlodipine 10 mg daily   -Hold lisinopril and lasix due to GRAEME

## 2024-01-18 NOTE — PROGRESS NOTE ADULT - PROBLEM SELECTOR PROBLEM 2
GRAEME (acute kidney injury)

## 2024-01-18 NOTE — PROGRESS NOTE ADULT - SUBJECTIVE AND OBJECTIVE BOX
Date of Service: 01-18-24 @ 11:45    Patient is a 78y old  Male who presents with a chief complaint of Weakness, cough (18 Jan 2024 10:44)      Any change in ROS: seems to be doing  ok : no sob:  no cough ; no phlegm      MEDICATIONS  (STANDING):  allopurinol 300 milliGRAM(s) Oral daily  amLODIPine   Tablet 10 milliGRAM(s) Oral every 24 hours  AQUAPHOR (petrolatum Ointment) 1 Application(s) Topical three times a day  atorvastatin 20 milliGRAM(s) Oral at bedtime  chlorhexidine 2% Cloths 1 Application(s) Topical daily  clopidogrel Tablet 75 milliGRAM(s) Oral daily  dexAMETHasone     Tablet 6 milliGRAM(s) Oral daily  ergocalciferol 14920 Unit(s) Oral every week  finasteride 5 milliGRAM(s) Oral daily  lisinopril 10 milliGRAM(s) Oral daily  pantoprazole  Injectable 40 milliGRAM(s) IV Push daily    MEDICATIONS  (PRN):  acetaminophen     Tablet .. 650 milliGRAM(s) Oral every 6 hours PRN Temp greater or equal to 38C (100.4F), Mild Pain (1 - 3)  aluminum hydroxide/magnesium hydroxide/simethicone Suspension 30 milliLiter(s) Oral every 4 hours PRN Dyspepsia  melatonin 3 milliGRAM(s) Oral at bedtime PRN Insomnia  ondansetron Injectable 4 milliGRAM(s) IV Push every 8 hours PRN Nausea and/or Vomiting    Vital Signs Last 24 Hrs  T(C): 37 (18 Jan 2024 05:40), Max: 37 (18 Jan 2024 05:40)  T(F): 98.6 (18 Jan 2024 05:40), Max: 98.6 (18 Jan 2024 05:40)  HR: 73 (18 Jan 2024 05:40) (65 - 74)  BP: 143/68 (18 Jan 2024 05:40) (131/67 - 146/60)  BP(mean): --  RR: 16 (18 Jan 2024 05:40) (16 - 18)  SpO2: 98% (18 Jan 2024 05:40) (98% - 99%)    Parameters below as of 18 Jan 2024 05:40  Patient On (Oxygen Delivery Method): room air        I&O's Summary    17 Jan 2024 07:01  -  18 Jan 2024 07:00  --------------------------------------------------------  IN: 0 mL / OUT: 600 mL / NET: -600 mL          Physical Exam:   GENERAL: NAD, well-groomed, well-developed  HEENT: PORFIRIO/   Atraumatic, Normocephalic  ENMT: No tonsillar erythema, exudates, or enlargement; Moist mucous membranes, Good dentition, No lesions  NECK: Supple, No JVD, Normal thyroid  CHEST/LUNG: Clear to auscultaion- no wheezing  CVS: Regular rate and rhythm; No murmurs, rubs, or gallops  GI: : Soft, Nontender, Nondistended; Bowel sounds present  NERVOUS SYSTEM:  Alert & Oriented X3  EXTREMITIES:  -edema  LYMPH: No lymphadenopathy noted  SKIN: No rashes or lesions  ENDOCRINOLOGY: No Thyromegaly  PSYCH: Appropriate    Labs:                              10.6   13.58 )-----------( 345      ( 18 Jan 2024 06:53 )             32.2                         10.2   12.74 )-----------( 366      ( 17 Jan 2024 05:52 )             31.3                         10.4   12.88 )-----------( 352      ( 16 Jan 2024 05:45 )             32.2                         10.7   11.67 )-----------( 300      ( 15 Kevin 2024 06:33 )             32.3     01-18    135  |  103  |  42<H>  ----------------------------<  96  4.9   |  23  |  1.46<H>  01-17    136  |  104  |  45<H>  ----------------------------<  148<H>  4.6   |  21<L>  |  1.61<H>  01-16    138  |  105  |  36<H>  ----------------------------<  99  4.4   |  23  |  1.44<H>  01-15    137  |  104  |  36<H>  ----------------------------<  105<H>  4.0   |  21<L>  |  1.33<H>    Ca    7.8<L>      18 Jan 2024 06:53  Ca    7.5<L>      17 Jan 2024 05:52  Phos  3.5     01-18  Phos  2.8     01-17  Mg     2.30     01-18  Mg     2.10     01-17    TPro  x   /  Alb  2.8<L>  /  TBili  x   /  DBili  x   /  AST  x   /  ALT  x   /  AlkPhos  x   01-15    CAPILLARY BLOOD GLUCOSE              Urinalysis Basic - ( 18 Jan 2024 06:53 )    Color: x / Appearance: x / SG: x / pH: x  Gluc: 96 mg/dL / Ketone: x  / Bili: x / Urobili: x   Blood: x / Protein: x / Nitrite: x   Leuk Esterase: x / RBC: x / WBC x   Sq Epi: x / Non Sq Epi: x / Bacteria: x        rad< from: MR Thoracic Spine w/wo IV Cont (01.15.24 @ 13:28) >  T12-L1: Bilateral hypertrophic facet joint changes seen. Mild narrowing   of the left neural foramen and mild to moderate narrowing of the right   neural foramen    L1-2: Normal    L2-3: Disc bulge and associated annularfissure is seen. Bilateral   hypertrophic facet joint changes seen. Mild narrowing of the spinal canal    L3-4: Bilateral hypertrophic facet joint changes seen. Mild narrowing of   both neural foramen    L4-5: Disc bulge and bilateral hypertrophic facet joint changes seen.   Mild to moderate narrowing of the spinal canal. Mild to moderate   narrowing of both neural foramen    The conus ends at bottom of L1 and appears normal.    Evaluation of the paraspinal soft tissues appear normal. Bilateral renal   cysts are identified.    Both SI joints appear intact.    IMPRESSION: Degenerative changes as described above.    No abnormal lesions are identified at this time.    --- End of Report ---            JUNO ACOSTA MD; Attending Radiologist  This document has been electronically signed. Kevin 15 2024  2:11PM    < end of copied text >  < from: US Kidney and Bladder (01.14.24 @ 13:35) >  CT 1/11/2024    TECHNIQUE: Sonography of the kidneys and bladder.    FINDINGS:  Right kidney: 10.5 cm. Increased cortical echogenicity. No renal calculus   or hydronephrosis. There is a midpole cortical cyst measuring 1.4 x 1.4 x   1.2 cm.    Left kidney: 10.8 cm. Increased cortical echogenicity. No renal calculus   or hydronephrosis. There is a midpole cortical cyst measuring 2.5 x 2.7 x   2.8 cm    Urinary bladder: Within normal limits.    IMPRESSION:    1. The kidneys demonstrate increased cortical echogenicity, compatible   with renal parenchymal disease. No renal calculus or hydronephrosis.  2. Bilateral renal cysts.        --- End of Report ---            SARI SANTOS MD; Attending Radiologist  This documenthas been electronically signed. Jan 14 2024  2:28PM    < end of copied text >      RECENT CULTURES:  01-11 @ 18:37 .Blood Blood-Peripheral                No growth at 5 days    01-11 @ 15:20 .Blood Blood-Peripheral                No growth at 5 days          RESPIRATORY CULTURES:          Studies  Chest X-RAY  CT SCAN Chest   Venous Dopplers: LE:   CT Abdomen  Others

## 2024-01-18 NOTE — PROGRESS NOTE ADULT - PROBLEM SELECTOR PLAN 1
Patient with productive cough. SOB, and generalized weakness   -Suspect PNA given symptoms, immunocompromised state, and hx of lung cancer   -Will treat with ceftriaxone and azithromycin IV  - check urine legionella Ag  -RVP: non-covid corona virus. MRSA neg  -Monitor spO2   -CT chest reviewed. some patchy opacities.  - pulm juani called.
Patient with productive cough. SOB, and generalized weakness   - Suspect PNA given symptoms, immunocompromised state, and hx of lung cancer   - CT chest reviewed. some patchy opacities.  - completed IV ceftriaxone 5 days course.   - urine legionella Ag, neg. d/c azithromycin IV  - RVP: non-covid corona virus. MRSA neg  - Monitor spO2   - pulm juani called. no wheezing, no need for steroids.
Patient with productive cough. SOB, and generalized weakness   -Suspect PNA given symptoms, immunocompromised state, and hx of lung cancer   - c/w ceftriaxone   - urine legionella Ag, neg. d/c azithromycin IV  -RVP: non-covid corona virus. MRSA neg  -Monitor spO2   -CT chest reviewed. some patchy opacities.  - pulm eval called. no wheezing, no need for steroids.
Patient with productive cough. SOB, and generalized weakness   -Suspect PNA given symptoms, immunocompromised state, and hx of lung cancer   - c/w ceftriaxone   - urine legionella Ag, neg. dc azithromycin IV  -RVP: non-covid corona virus. MRSA neg  -Monitor spO2   -CT chest reviewed. some patchy opacities.  - pulm eval called. no wheezing, no need for steroids.
Patient with productive cough. SOB, and generalized weakness   -Suspect PNA given symptoms, immunocompromised state, and hx of lung cancer   -Will treat with ceftriaxone and azithromycin IV  - check urine legionella Ag  -RVP: non-covid corona virus. MRSA neg  -Monitor spO2   -CT chest reviewed. some patchy opacities.  - pulm eval called. no wheezing, no need for steroids.

## 2024-01-18 NOTE — DIETITIAN INITIAL EVALUATION ADULT - OTHER INFO
79 y/o male with hx HLD, CAD, PAD, HTN and metastatic lung ca admitted with weakness. Visited with pt to obtain nutrition hx. Pt reported eating fairly well. He denies food allergies, nausea/vomiting/diarrhea/constipation, or issues with chewing/swallowing; last BM 1/16. Food preferences taken and menu alternatives discussed. His weight was somewhat stable PTA; he weighed 190 lbs in June 2023 with current admit wt 181 lbs indicating a 5% loss which is not clinically significant. Discussed oral supplementation to promote weight stabilization with pt amenable towards receiving Orgain supplement x 2/day (500 kcal, 32 gm protein) to optimize his oral intake and overall nutrition. Pt aware of his dietary restrictions as he limits salt and fatty foods in his diet. Reinforced dietary principles with patient. Encourage and monitor oral intake, especially of nutrition supplement. RDN services to remain available as needed.

## 2024-01-18 NOTE — DIETITIAN INITIAL EVALUATION ADULT - PROBLEM SELECTOR PLAN 2
Pt with GRAEME on CKD   -Most likely due to dehydration   -CT A/P for restaging will assess for obstruction as well    -F/u with UA and urine studies   -Hold lasix for now   -Will give 1L NS x1

## 2024-01-18 NOTE — DIETITIAN INITIAL EVALUATION ADULT - PERTINENT LABORATORY DATA
01-18    135  |  103  |  42<H>  ----------------------------<  96  4.9   |  23  |  1.46<H>    Ca    7.8<L>      18 Jan 2024 06:53  Phos  3.5     01-18  Mg     2.30     01-18    A1C with Estimated Average Glucose Result: 6.1 % (01-16-24 @ 05:45)

## 2024-01-18 NOTE — PROGRESS NOTE ADULT - PROBLEM SELECTOR PLAN 3
Patient with LE weakness post LP   - However, on exam, 5/5 strength in LE  - CT head showed Small focus of encephalomalacia/gliotic change in the left postcentral gyrus. 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 no cord compression.   - C/w decadron 6 mg daily per heme/onc  - PT eval: outpt PT.  - Fall precautions

## 2024-01-19 NOTE — DISCHARGE NOTE PROVIDER - DISCHARGE SERVICE FOR PATIENT
BMP/CBC/Type and Screen/EKG/CXR/COVID-19
on the discharge service for the patient. I have reviewed and made amendments to the documentation where necessary.

## 2024-01-19 NOTE — PROGRESS NOTE ADULT - REASON FOR ADMISSION
Weakness, cough

## 2024-01-19 NOTE — PROVIDER CONTACT NOTE (OTHER) - BACKGROUND
Pt weakness, metastatic lung cancer
Pt. weakness, metastatic lung cancer
diagnosed with metastatic lung cancer/brain mets
patient admitted for metastatic lung cancer/mets to brain

## 2024-01-19 NOTE — DISCHARGE NOTE PROVIDER - NSDCMRMEDTOKEN_GEN_ALL_CORE_FT
acetaminophen 325 mg oral tablet: 2 tab(s) orally every 6 hours As needed Temp greater or equal to 38C (100.4F), Mild Pain (1 - 3)  allopurinol 300 mg oral tablet: 1 tab(s) orally once a day  amLODIPine 10 mg oral tablet: 1 tab(s) orally every 24 hours  atorvastatin 20 mg oral tablet: 1 tab(s) orally once a day  clopidogrel 75 mg oral tablet: 1 tab(s) orally once a day  dexAMETHasone 6 mg oral tablet: 1 tab(s) orally once a day  finasteride 5 mg oral tablet: 1 tab(s) orally once a day  lisinopril 20 mg oral tablet: 0.5 tab(s) orally once a day

## 2024-01-19 NOTE — PROGRESS NOTE ADULT - ASSESSMENT
79 yo M with Pmhx metastatic lung cancer, HLD, CAD, PAD, gout, and HTN presents to the ED with weakness and cough, Pt reports that he has been feeling weak for the last 1 week. He has been mostly in his bed and felt dizzy whenever he tried to move or stand. He also had a productive cough and CHOI. Denies any recent fever, chills, nausea, vomiting, abdominal pain or diarrhea. 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 Because of this new finding and worsening symptoms, his oncologist recommended him to come to the ED.   In the ED, his vitals were notable for HTN. Labs were notable for chronic anemia, GRAEME on CKD. CT head showed no acute mass or bleeding.   (11 Jan 2024 18:45)   above confirmed:  he came to hospital and was found to have non covid coronavirus infection:  currently he is not wheezing  He is on room air 99% sao2:         Non covid coronavirus infection:   Lung cancer metastatic:   CAD  PAD  HTN    Non covid coronavirus infection:   -supportive care:   -no need for steroids  from pulm stand point  as he is not wheezing  -he is on room air with excellent sao2:   -his ct chest showed:   New confluent opacities in the right lung can represent pneumonia. No evidence of disease in the abdomen and pelvis- cont antibiotics  check legionella   1/13: legionella is still pending:  no sob:  no cough : no phlegm  : no chest pain : cnt antibiotics  he is on room air:  seems to be feeling a little better then yesterday   1/14; legionella is negative:  Afebrile:  WBC ic slightly increasing:  clinically looks OK;  cont antibiotics for 7 days   1/15: seems to be doing  ok ; no sob:  no cough : no phlegm  on antibiotics : d5/7   1/16: antibiosis finished in 5 days:  defer to primary team : he is afebrile and has normal WBC count   1/17: remains afebrile:  no sob:  on room air: alert and awake  1/18: he seems better:  no sob:  no cough : no phlegm : on room air  1/19: feels pretty good:  s/p wbrt yesterday  : on room air:   Lung cancer metastatic:   -has brain mets:  on dexa:  seen by rad onc:  has leptomeningeal disease:  defer to rad onc   1/14: now for possible WBRT   1/16: awaiting radiation  1/18: cont per primary team  1/19: got wbrt: ? for dc   CAD  -cont home meds   PAD  -per PMD  HTN  -controlled:     dvt prophylaxis    dw team

## 2024-01-19 NOTE — PROVIDER CONTACT NOTE (OTHER) - SITUATION
patient requested for his IV to be removed due to a numbing feeling to the arm, Also request for IV not to re-placed until tomorrow 1/18/24
BP dropped from 138/61 while lying down to 83/56 standing.
patient requests for sequential compression device to be removed, states that when he is at home he only uses it for 1 1/2hours. Educated regarding the importance but still refuses.
BP dropped from 139/47 while laying down to 109/52 on standing

## 2024-01-19 NOTE — PROGRESS NOTE ADULT - SUBJECTIVE AND OBJECTIVE BOX
Date of Service: 01-19-24 @ 10:28    Patient is a 78y old  Male who presents with a chief complaint of Weakness     (18 Jan 2024 12:36)      Any change in ROS: seems to be doing ok : no sob:  no cough : no phlegm     MEDICATIONS  (STANDING):  allopurinol 300 milliGRAM(s) Oral daily  amLODIPine   Tablet 10 milliGRAM(s) Oral every 24 hours  AQUAPHOR (petrolatum Ointment) 1 Application(s) Topical three times a day  atorvastatin 20 milliGRAM(s) Oral at bedtime  chlorhexidine 2% Cloths 1 Application(s) Topical daily  clopidogrel Tablet 75 milliGRAM(s) Oral daily  dexAMETHasone     Tablet 6 milliGRAM(s) Oral daily  ergocalciferol 33132 Unit(s) Oral every week  finasteride 5 milliGRAM(s) Oral daily  lisinopril 10 milliGRAM(s) Oral daily  pantoprazole  Injectable 40 milliGRAM(s) IV Push daily    MEDICATIONS  (PRN):  acetaminophen     Tablet .. 650 milliGRAM(s) Oral every 6 hours PRN Temp greater or equal to 38C (100.4F), Mild Pain (1 - 3)  aluminum hydroxide/magnesium hydroxide/simethicone Suspension 30 milliLiter(s) Oral every 4 hours PRN Dyspepsia  melatonin 3 milliGRAM(s) Oral at bedtime PRN Insomnia  ondansetron Injectable 4 milliGRAM(s) IV Push every 8 hours PRN Nausea and/or Vomiting    Vital Signs Last 24 Hrs  T(C): 36.6 (19 Jan 2024 06:34), Max: 36.7 (18 Jan 2024 14:12)  T(F): 97.8 (19 Jan 2024 06:34), Max: 98 (18 Jan 2024 14:12)  HR: 65 (19 Jan 2024 06:34) (62 - 78)  BP: 145/57 (19 Jan 2024 06:34) (119/55 - 145/57)  BP(mean): --  RR: 18 (19 Jan 2024 06:34) (16 - 18)  SpO2: 98% (19 Jan 2024 06:34) (98% - 99%)    Parameters below as of 19 Jan 2024 06:34  Patient On (Oxygen Delivery Method): room air        I&O's Summary    18 Jan 2024 07:01  -  19 Jan 2024 07:00  --------------------------------------------------------  IN: 0 mL / OUT: 425 mL / NET: -425 mL          Physical Exam:   GENERAL: NAD, well-groomed, well-developed  HEENT: PORFIRIO/   Atraumatic, Normocephalic  ENMT: No tonsillar erythema, exudates, or enlargement; Moist mucous membranes, Good dentition, No lesions  NECK: Supple, No JVD, Normal thyroid  CHEST/LUNG: Clear to auscultaion  CVS: Regular rate and rhythm; No murmurs, rubs, or gallops  GI: : Soft, Nontender, Nondistended; Bowel sounds present  NERVOUS SYSTEM:  Alert & Oriented X3  EXTREMITIES:  2+ Peripheral Pulses, No clubbing, cyanosis, or edema  LYMPH: No lymphadenopathy noted  SKIN: No rashes or lesions  ENDOCRINOLOGY: No Thyromegaly  PSYCH: Appropriate    Labs:                              10.7   15.98 )-----------( 334      ( 19 Jan 2024 06:12 )             32.4                         10.6   13.58 )-----------( 345      ( 18 Jan 2024 06:53 )             32.2                         10.2   12.74 )-----------( 366      ( 17 Jan 2024 05:52 )             31.3                         10.4   12.88 )-----------( 352      ( 16 Jan 2024 05:45 )             32.2     01-19    137  |  105  |  44<H>  ----------------------------<  92  5.1   |  21<L>  |  1.46<H>  01-18    135  |  103  |  42<H>  ----------------------------<  96  4.9   |  23  |  1.46<H>  01-17    136  |  104  |  45<H>  ----------------------------<  148<H>  4.6   |  21<L>  |  1.61<H>  01-16    138  |  105  |  36<H>  ----------------------------<  99  4.4   |  23  |  1.44<H>    Ca    7.5<L>      19 Jan 2024 06:12  Ca    7.8<L>      18 Jan 2024 06:53  Phos  3.1     01-19  Phos  3.5     01-18  Mg     2.30     01-19  Mg     2.30     01-18    TPro  5.2<L>  /  Alb  2.6<L>  /  TBili  <0.2  /  DBili  x   /  AST  17  /  ALT  24  /  AlkPhos  68  01-19    CAPILLARY BLOOD GLUCOSE          LIVER FUNCTIONS - ( 19 Jan 2024 06:12 )  Alb: 2.6 g/dL / Pro: 5.2 g/dL / ALK PHOS: 68 U/L / ALT: 24 U/L / AST: 17 U/L / GGT: x             Urinalysis Basic - ( 19 Jan 2024 06:12 )    Color: x / Appearance: x / SG: x / pH: x  Gluc: 92 mg/dL / Ketone: x  / Bili: x / Urobili: x   Blood: x / Protein: x / Nitrite: x   Leuk Esterase: x / RBC: x / WBC x   Sq Epi: x / Non Sq Epi: x / Bacteria: x            RECENT CULTURES:        RESPIRATORY CULTURES:        rad  · Note Type	Event Note  rad onc      Mr. Worthy, 78y.o. 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           WBRT from 1/12/24 through 1/19/24.   tolerating well.           < from: MR Head w/wo IV Cont (12.20.23 @ 10:56) >    IMPRESSION:    Reidentified is a subcentimeter focus within the cortex of the left   postcentral gyrus, decreased in size and enhancement since the prior   exam. This lesion is too small to see by perfusion imaging. Small amount   of surrounding gliosis appears unchanged. No new enhancing lesions are   identified within the brain parenchyma.    Reidentified is abnormal enhancing soft tissue within the left internal   auditory canal extending from the left cerebellopontine angle, involving   the intracanalicular portion of the IAC, and appears to extend into the   cochlea, mildly increased in size since prior exam, particularly along   the cerebellopontine angle. Likewise, there is enlargement and greater   degree of enhancing soft tissue within the mid and distal aspect of the   intracanalicular portion of the right internal auditory canal with   extension into the basilar turn of the right cochlea.Given appearance   and increase in size since prior exam, the lesions are most worrisome for   metastases/leptomeningeal neoplasm. There is no additional evidence of   leptomeningeal enhancement of the rest of the cranial nerves or within   the subarachnoid spaces of the brain.    This was discussed with Dr. Mejia.< from: CT Chest No Cont (01.11.24 @ 19:32) >    BOWEL: No bowel obstruction. Appendix is normal.  PERITONEUM: No ascites.  VESSELS: Atherosclerotic changes. Left iliac artery stent.  RETROPERITONEUM/LYMPHNODES: No lymphadenopathy.  ABDOMINAL WALL: Within normal limits.  BONES: Degenerative changes.    IMPRESSION:  New confluent opacities in the right lung can represent pneumonia.  No evidence of disease in the abdomen and pelvis.    < end of copied text >    Studies  Chest X-RAY  CT SCAN Chest   Venous Dopplers: LE:   CT Abdomen  Others

## 2024-01-19 NOTE — DISCHARGE NOTE NURSING/CASE MANAGEMENT/SOCIAL WORK - PATIENT PORTAL LINK FT
You can access the FollowMyHealth Patient Portal offered by [facility] by registering at the following website: [email or web address]. By joining [employer]’s FollowMyHealth portal, you will also be able to view your health information using other applications (apps) compatible with our system.

## 2024-01-19 NOTE — PROVIDER CONTACT NOTE (OTHER) - REASON
Patient requests for IV not to be placed until 1/18/24
Positive orthostatics
patient refuses sequential compression devices
Positive Orthostatics

## 2024-01-19 NOTE — DISCHARGE NOTE NURSING/CASE MANAGEMENT/SOCIAL WORK - NSDCPEFALRISK_GEN_ALL_CORE
For information on Fall & Injury Prevention, visit: [email or web address] OR  [email or web address] OR  https://www.cdc.gov/steadi/patient.html

## 2024-01-19 NOTE — DISCHARGE NOTE PROVIDER - HOSPITAL COURSE
79 yo M with PMHx metastatic lung cancer, HLD, CAD, PAD, gout, and HTN presents to the ED with weakness and cough, possibly due to PNA.     PNA (pneumonia).   ·  Plan: Patient with productive cough. SOB, and generalized weakness   - Suspect PNA given symptoms, immunocompromised state, and hx of lung cancer   - CT chest reviewed. some patchy opacities.  - completed IV ceftriaxone 5 days course.   - urine legionella Ag, neg. d/c azithromycin IV  - RVP: non-covid corona virus. MRSA neg  - Monitor spO2   - pulm eval called. no wheezing, no need for steroids.    GRAEME (acute kidney injury).   ·  Plan: Pt with GRAEME on CKD   -Most likely due to dehydration   -CT A/P no hydronephrosis  -Hold lasix for now   -received gentle IVF  -Cr back to baseline.   - encourage oral intake.    Lower extremity weakness.   ·  Plan: Patient with LE weakness post LP   - However, on exam, 5/5 strength in LE  - CT head showed Small focus of encephalomalacia/gliotic change in the left postcentral gyrus. 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 no cord compression.   - C/w decadron 6 mg daily per heme/onc  - PT eval: outpt PT.  - Fall precautions.    Lung cancer.   ·  Plan: Pt with hx SCLC previously on carbo/etoposide/atezo (followed by Dr. Lin at [facility]), s/p SRS to single brain met left frontal  -Recently developed dizziness/vertigo s/p MR brain and LP confirmed leptomeningeal carcinomatosis   -Rad/onc consulted, plan for RT here. 5 sessions, last on 1/19/24  -CT A/P and chest for restaging reviewed.   MR lumbar and thoracic spine to r/o mets: denegerative changes.  -Hold cellcept for now   -neuro/onc recs appreciated.    Essential hypertension.   ·  Plan: C/w amlodipine 10 mg daily   -initially Holding lisinopril and lasix due to GRAEME  - BP rising. Cr stable.  - home Lisinopril 10 mg restarted.    Preventive measure.   ·  Plan: DVT ppx SCD given brain mets.   Physical therapy. Out of bed to chair with assistance.    Dispo: Home with outpatient PT    On 1/19/2024, case was discussed with Dr. Austin Alvarez, patient is medically cleared and optimized for discharge today.   All medications were reviewed with attending, and sent to mutually agreed upon pharmacy.

## 2024-01-19 NOTE — PROVIDER CONTACT NOTE (OTHER) - ASSESSMENT
Pt. c/o dizziness on standing
Pt states no dizziness while standing
patient is A&O-4, no, warmth, redness or swelling noted to IV site. sensation, pulse and capillary response intact.  patient has no IV medications for the night. Due next IV medication @12pm 1/18/24
patient is A&O-4

## 2024-01-19 NOTE — PROGRESS NOTE ADULT - PROVIDER SPECIALTY LIST ADULT
Pulmonology
Pulmonology
Internal Medicine
Internal Medicine
Pulmonology
Internal Medicine

## 2024-01-19 NOTE — DISCHARGE NOTE PROVIDER - CARE PROVIDER_API CALL
Mohit Malik.  Internal Medicine  [address] [facility], [address]  [place] [address]  Phone: [phone]  Fax: [phone]  Established Patient  Follow Up Time:

## 2024-01-19 NOTE — PROVIDER CONTACT NOTE (OTHER) - ACTION/TREATMENT ORDERED:
ACP notified, patient educated, no new orders at this time
Ok to give Lisinopril 10mg PO now
Daily orthostatics and 1 L fluid bolus
ACP notified, patient educated, no new orders at this time

## 2024-01-19 NOTE — DISCHARGE NOTE PROVIDER - NSDCCPCAREPLAN_GEN_ALL_CORE_FT
PRINCIPAL DISCHARGE DIAGNOSIS  Diagnosis: Generalized weakness  Assessment and Plan of Treatment: Continue medications as prescribed. Follow up with your primary care doctor for further evaluation and management. Please call to make an appointment within 1-2 weeks of discharge.        SECONDARY DISCHARGE DIAGNOSES  Diagnosis: Essential hypertension  Assessment and Plan of Treatment: Continue blood pressure medication regimen as directed. Follow a low salt/cholesterol diet. Monitor for any visual changes, headaches or dizziness.  Monitor blood pressure regularly.  Follow up with your primary care provider for further management for high blood pressure.      Diagnosis: Lung cancer  Assessment and Plan of Treatment: Continue medications as prescribed. Follow up with your primary care doctor for further evaluation and management. Please call to make an appointment within 1-2 weeks of discharge.      Diagnosis: GRAEME (acute kidney injury)  Assessment and Plan of Treatment: In order to prevent further disease progression, continue to follow recommendations made by your primary provider/nephrologist. Continue a diet that is low in sodium and avoid foods that are concentrated in potassium and phosphorus. Continue your medications/supplementations as directed and avoid over-the-counter drugs that are harmful to kidneys, such as, Non-Steroidal Anti-Inflammatory Drugs (NSAIDs). Follow-up as outpatient to monitor your kidney function, as well as, vitamin D, Calcium, potassium, and phosphorus levels.   LASIX is on HOLD for now, please follow up outpatient, encourage oral fluid intake       Diagnosis: PNA (pneumonia)  Assessment and Plan of Treatment: Continue medications as prescribed. Follow up with your primary care doctor for further evaluation and management. Please call to make an appointment within 1-2 weeks of discharge.  - CT chest reviewed. some patchy opacities.  - completed IV antibiotics ceftriaxone 5 days course.   - urine legionella Ag was negative   - RVP: non-covid corona virus. MRSA neg  - Pulmonary consulted

## 2024-02-09 NOTE — PATIENT PROFILE ADULT - FALL HARM RISK - DEVICES
- Will cover with oral antibiotic for perceived cellulitis  -Warm compresses advocated and keeping area clean and dry with warm water and mild soap  -If area opens up and drains, recommend applying topical antibiotic ointment with bacitracin packets were given in office today   None

## 2024-06-15 NOTE — DISCHARGE NOTE NURSING/CASE MANAGEMENT/SOCIAL WORK - FLU SEASON?
If symptoms worsens or fail to improve follow-up with PCP or ER.    Thank you for visiting [facility] today.    -Tylenol/Ibuprofen for pain/fever  -Throat lozenges or throat sprays may help with discomfort  -Salt water gargles with 1/2 teaspoon to 1 teaspoon of Benadryl  -Soft, cold foods may soothe your throat as well as ice chips  -Increase humidity in house  -Viscous lidocaine gargles, if prescribed  -Follow up with ENT if no improvement  -Gargle with Listerine     If you begin to have worsening pain, uncontrollable fever greater than 100.4 or difficulty swallowing, please go to the ER.   
Yes...

## 2024-08-07 NOTE — DISCHARGE NOTE PROVIDER - TIME SPENT: (MINUTES SPENT ON THE DISCHARGE SERVICE)
FYI:)  Ryan Givens,   Your stool sample noted inflammation.  Please keep your follow up GI appointment as scheduled.   Thanks,  Dana
20

## (undated) DEVICE — GOWN TRIMAX LG

## (undated) DEVICE — STAPLER ECHELON FLEX POWERED PLUS 340MM

## (undated) DEVICE — SUT PROLENE 0 30" CT-1

## (undated) DEVICE — DRAPE INSTRUMENT POUCH 6.75" X 11"

## (undated) DEVICE — ENDOCATCH 10MM SPECIMEN POUCH

## (undated) DEVICE — DRSG BENZOIN 0.6CC

## (undated) DEVICE — TUBING SUCTION 20FT

## (undated) DEVICE — DRAPE IOBAN 23" X 23"

## (undated) DEVICE — SOL IRR POUR H2O 250ML

## (undated) DEVICE — SUCTION YANKAUER NO CONTROL VENT

## (undated) DEVICE — GLV 8 PROTEXIS (WHITE)

## (undated) DEVICE — POSITIONER STRAP ARMBOARD VELCRO TS-30

## (undated) DEVICE — DRSG TEGADERM 6"X8"

## (undated) DEVICE — DRAPE TOWEL BLUE 17" X 24"

## (undated) DEVICE — BLADE SCALPEL SAFETYLOCK #15

## (undated) DEVICE — LAP PAD 18 X 18"

## (undated) DEVICE — SPECIMEN CONTAINER 100ML

## (undated) DEVICE — SOL ANTI FOG

## (undated) DEVICE — TUBING SUCTION NONCONDUCTIVE 6MM X 12FT

## (undated) DEVICE — POSITIONER FOAM EGG CRATE ULNAR 2PCS (PINK)

## (undated) DEVICE — SUT SILK 2-0 30" TIES

## (undated) DEVICE — STAPLER SKIN VISI-STAT 35 WIDE

## (undated) DEVICE — PACK MAJOR ABDOMINAL W ENDO DRAPE

## (undated) DEVICE — ENDOCATCH II 15MM

## (undated) DEVICE — SUT SILK 0 18" TIES

## (undated) DEVICE — VISITEC 4X4

## (undated) DEVICE — DRSG TEGADERM 4X4.75"

## (undated) DEVICE — ELCTR GROUNDING PAD ADULT COVIDIEN

## (undated) DEVICE — FOLEY TRAY 16FR 5CC LF UMETER CLOSED

## (undated) DEVICE — MEDICATION LABELS W MARKER

## (undated) DEVICE — WARMING BLANKET LOWER ADULT

## (undated) DEVICE — VENODYNE/SCD SLEEVE CALF MEDIUM

## (undated) DEVICE — DRAIN PLEURX KIT WITH 500ML VACUUM BOTTLE

## (undated) DEVICE — SUT VICRYL 3-0 27" SH UNDYED

## (undated) DEVICE — SUT VICRYL 1 27" CTX

## (undated) DEVICE — DRAPE LARGE SHEET 72X85"

## (undated) DEVICE — DISSECTOR ENDO PEANUT 5MM

## (undated) DEVICE — GLV 7 PROTEXIS (WHITE)

## (undated) DEVICE — HAND-AID ARTERIAL WRIST SUPPORT

## (undated) DEVICE — ELCTR BOVIE TIP BLADE INSULATED 2.75" EDGE

## (undated) DEVICE — DRAPE MAGNETIC INSTRUMENT MEDIUM

## (undated) DEVICE — SOL IRR POUR NS 0.9% 500ML

## (undated) DEVICE — SUT MONOCRYL 4-0 27" PS-2 UNDYED

## (undated) DEVICE — SUT VICRYL 2-0 27" UR-6

## (undated) DEVICE — DRSG STERISTRIPS 0.5 X 4"

## (undated) DEVICE — ELCTR BOVIE TIP BLADE INSULATED 6.5" EDGE

## (undated) DEVICE — DISSECTOR ENDOSCOPIC KITTNER SINGLE TIP

## (undated) DEVICE — TAPE SILK 3"

## (undated) DEVICE — SYR SLIP 10CC

## (undated) DEVICE — PACK MINOR

## (undated) DEVICE — GLV 7.5 PROTEXIS (WHITE)

## (undated) DEVICE — DRAPE IOBAN 33" X 23"

## (undated) DEVICE — CHEST DRAIN OASIS DRY SUCTION WATER SEAL

## (undated) DEVICE — SUT VICRYL 0 27" CT-1 UNDYED

## (undated) DEVICE — ELCTR EXTENSION STRAIGHT

## (undated) DEVICE — ADAPTER FIBEROPTIC BRONCHOSCOPE DUAL AXIS SWIVEL

## (undated) DEVICE — CONNECTOR REDUCING STRAIGHT 3/8X0.25"

## (undated) DEVICE — SUT SILK 2-0 24" TIES

## (undated) DEVICE — PREP CHLORAPREP HI-LITE ORANGE 26ML

## (undated) DEVICE — DRSG TELFA 3 X 8